# Patient Record
Sex: FEMALE | Race: WHITE | Employment: OTHER | URBAN - METROPOLITAN AREA
[De-identification: names, ages, dates, MRNs, and addresses within clinical notes are randomized per-mention and may not be internally consistent; named-entity substitution may affect disease eponyms.]

---

## 2018-02-21 PROBLEM — I10 HYPERTENSION: Status: ACTIVE | Noted: 2018-02-21

## 2018-02-21 PROBLEM — M79.10 MYALGIA: Status: ACTIVE | Noted: 2018-02-21

## 2018-02-21 PROBLEM — M35.3 PMR (POLYMYALGIA RHEUMATICA) (HCC): Status: ACTIVE | Noted: 2018-02-21

## 2018-02-21 PROBLEM — M47.812 ARTHROPATHY OF CERVICAL FACET JOINT: Status: ACTIVE | Noted: 2018-02-21

## 2018-02-21 PROBLEM — E78.5 HYPERLIPIDEMIA: Status: ACTIVE | Noted: 2018-02-21

## 2018-03-15 RX ORDER — CLOPIDOGREL BISULFATE 75 MG/1
75 TABLET ORAL DAILY
Qty: 30 TABLET | Refills: 3 | Status: SHIPPED | OUTPATIENT
Start: 2018-03-15 | End: 2018-08-09 | Stop reason: SDUPTHER

## 2018-03-23 RX ORDER — CLONIDINE HYDROCHLORIDE 0.1 MG/1
0.1 TABLET ORAL 2 TIMES DAILY
Qty: 180 TABLET | Refills: 0 | Status: SHIPPED | OUTPATIENT
Start: 2018-03-23 | End: 2018-08-09 | Stop reason: SDUPTHER

## 2018-04-03 RX ORDER — PREDNISONE 10 MG/1
10 TABLET ORAL DAILY
Qty: 90 TABLET | Refills: 0 | Status: SHIPPED | OUTPATIENT
Start: 2018-04-03 | End: 2018-04-13

## 2018-04-17 ENCOUNTER — HOSPITAL ENCOUNTER (OUTPATIENT)
Dept: MAMMOGRAPHY | Age: 83
Discharge: HOME | End: 2018-04-19
Payer: MEDICARE

## 2018-04-17 DIAGNOSIS — M35.3 POLYMYALGIA RHEUMATICA (HCC): ICD-10-CM

## 2018-04-17 PROCEDURE — 77080 DXA BONE DENSITY AXIAL: CPT

## 2018-05-03 RX ORDER — LEVOTHYROXINE SODIUM 0.05 MG/1
50 TABLET ORAL DAILY
Qty: 90 TABLET | Refills: 0 | Status: SHIPPED | OUTPATIENT
Start: 2018-05-03 | End: 2018-08-09

## 2018-05-11 ENCOUNTER — HOSPITAL ENCOUNTER (EMERGENCY)
Age: 83
Discharge: HOME | End: 2018-05-11
Payer: MEDICARE

## 2018-05-11 VITALS
OXYGEN SATURATION: 98 % | WEIGHT: 150 LBS | RESPIRATION RATE: 16 BRPM | DIASTOLIC BLOOD PRESSURE: 83 MMHG | SYSTOLIC BLOOD PRESSURE: 204 MMHG | HEART RATE: 57 BPM | BODY MASS INDEX: 27.44 KG/M2 | TEMPERATURE: 98.4 F

## 2018-05-11 DIAGNOSIS — H10.32 ACUTE BACTERIAL CONJUNCTIVITIS OF LEFT EYE: Primary | ICD-10-CM

## 2018-05-11 PROCEDURE — 99212 OFFICE O/P EST SF 10 MIN: CPT

## 2018-05-11 RX ORDER — POLYMYXIN B SULFATE AND TRIMETHOPRIM 1; 10000 MG/ML; [USP'U]/ML
1 SOLUTION OPHTHALMIC EVERY 4 HOURS
Qty: 1 BOTTLE | Refills: 0 | Status: SHIPPED | OUTPATIENT
Start: 2018-05-11 | End: 2018-05-18

## 2018-05-17 ENCOUNTER — OFFICE VISIT (OUTPATIENT)
Dept: PRIMARY CARE CLINIC | Age: 83
End: 2018-05-17
Payer: MEDICARE

## 2018-05-17 VITALS
SYSTOLIC BLOOD PRESSURE: 124 MMHG | BODY MASS INDEX: 28.52 KG/M2 | WEIGHT: 155 LBS | OXYGEN SATURATION: 98 % | HEIGHT: 62 IN | DIASTOLIC BLOOD PRESSURE: 80 MMHG | HEART RATE: 61 BPM

## 2018-05-17 DIAGNOSIS — E03.9 HYPOTHYROIDISM, UNSPECIFIED TYPE: ICD-10-CM

## 2018-05-17 DIAGNOSIS — G45.8 OTHER SPECIFIED TRANSIENT CEREBRAL ISCHEMIAS: ICD-10-CM

## 2018-05-17 DIAGNOSIS — M35.3 PMR (POLYMYALGIA RHEUMATICA) (HCC): ICD-10-CM

## 2018-05-17 DIAGNOSIS — H10.89 OTHER CONJUNCTIVITIS OF BOTH EYES: ICD-10-CM

## 2018-05-17 DIAGNOSIS — I10 HYPERTENSION, UNSPECIFIED TYPE: Primary | ICD-10-CM

## 2018-05-17 DIAGNOSIS — E78.5 HYPERLIPIDEMIA, UNSPECIFIED HYPERLIPIDEMIA TYPE: ICD-10-CM

## 2018-05-17 PROCEDURE — 99213 OFFICE O/P EST LOW 20 MIN: CPT | Performed by: INTERNAL MEDICINE

## 2018-05-17 RX ORDER — PREDNISONE 10 MG/1
10 TABLET ORAL EVERY MORNING
COMMUNITY
End: 2018-08-09

## 2018-05-17 RX ORDER — UBIDECARENONE 30 MG
30 CAPSULE ORAL DAILY
COMMUNITY
End: 2018-08-09

## 2018-05-17 RX ORDER — GLUCOSAMINE SULFATE DIPOT CHLR 1000 MG
1 TABLET ORAL DAILY
COMMUNITY
Start: 2018-04-28

## 2018-05-17 ASSESSMENT — ENCOUNTER SYMPTOMS
BLURRED VISION: 0
ABDOMINAL PAIN: 0
ORTHOPNEA: 0
EYE DISCHARGE: 1
EYE REDNESS: 1
NAUSEA: 0
HEMOPTYSIS: 0
BLOOD IN STOOL: 0
SHORTNESS OF BREATH: 0

## 2018-05-31 ENCOUNTER — OFFICE VISIT (OUTPATIENT)
Dept: PRIMARY CARE CLINIC | Age: 83
End: 2018-05-31
Payer: MEDICARE

## 2018-05-31 VITALS
OXYGEN SATURATION: 92 % | DIASTOLIC BLOOD PRESSURE: 82 MMHG | WEIGHT: 157 LBS | HEIGHT: 62 IN | SYSTOLIC BLOOD PRESSURE: 142 MMHG | BODY MASS INDEX: 28.89 KG/M2 | HEART RATE: 67 BPM

## 2018-05-31 DIAGNOSIS — E78.5 HYPERLIPIDEMIA, UNSPECIFIED HYPERLIPIDEMIA TYPE: ICD-10-CM

## 2018-05-31 DIAGNOSIS — I10 HYPERTENSION, UNSPECIFIED TYPE: Primary | ICD-10-CM

## 2018-05-31 DIAGNOSIS — G45.8 OTHER SPECIFIED TRANSIENT CEREBRAL ISCHEMIAS: ICD-10-CM

## 2018-05-31 DIAGNOSIS — M25.551 ARTHRALGIA OF RIGHT HIP: ICD-10-CM

## 2018-05-31 DIAGNOSIS — E03.8 OTHER SPECIFIED HYPOTHYROIDISM: ICD-10-CM

## 2018-05-31 DIAGNOSIS — M35.3 PMR (POLYMYALGIA RHEUMATICA) (HCC): ICD-10-CM

## 2018-05-31 PROCEDURE — 99213 OFFICE O/P EST LOW 20 MIN: CPT | Performed by: INTERNAL MEDICINE

## 2018-05-31 ASSESSMENT — ENCOUNTER SYMPTOMS
BLURRED VISION: 0
BLOOD IN STOOL: 0
ORTHOPNEA: 0
EYE DISCHARGE: 0
NAUSEA: 0
SHORTNESS OF BREATH: 0
HEMOPTYSIS: 0
ABDOMINAL PAIN: 0

## 2018-08-03 ENCOUNTER — HOSPITAL ENCOUNTER (OUTPATIENT)
Age: 83
Discharge: HOME | End: 2018-08-05
Payer: MEDICARE

## 2018-08-03 DIAGNOSIS — M35.3 PMR (POLYMYALGIA RHEUMATICA) (HCC): ICD-10-CM

## 2018-08-03 DIAGNOSIS — E03.8 OTHER SPECIFIED HYPOTHYROIDISM: ICD-10-CM

## 2018-08-03 DIAGNOSIS — E78.5 HYPERLIPIDEMIA, UNSPECIFIED HYPERLIPIDEMIA TYPE: ICD-10-CM

## 2018-08-03 DIAGNOSIS — I10 HYPERTENSION, UNSPECIFIED TYPE: ICD-10-CM

## 2018-08-03 LAB
ALBUMIN SERPL-MCNC: 4 G/DL (ref 3.5–5.2)
ALP BLD-CCNC: 86 U/L (ref 35–104)
ALT SERPL-CCNC: 9 U/L (ref 0–32)
ANION GAP SERPL CALCULATED.3IONS-SCNC: 17 MMOL/L (ref 7–16)
AST SERPL-CCNC: 17 U/L (ref 0–31)
BASOPHILS ABSOLUTE: 0.02 E9/L (ref 0–0.2)
BASOPHILS RELATIVE PERCENT: 0.2 % (ref 0–2)
BILIRUB SERPL-MCNC: 0.3 MG/DL (ref 0–1.2)
BUN BLDV-MCNC: 22 MG/DL (ref 8–23)
CALCIUM SERPL-MCNC: 9.4 MG/DL (ref 8.6–10.2)
CHLORIDE BLD-SCNC: 104 MMOL/L (ref 98–107)
CHOLESTEROL, FASTING: 226 MG/DL (ref 0–199)
CO2: 25 MMOL/L (ref 22–29)
CREAT SERPL-MCNC: 0.9 MG/DL (ref 0.5–1)
EOSINOPHILS ABSOLUTE: 0.04 E9/L (ref 0.05–0.5)
EOSINOPHILS RELATIVE PERCENT: 0.5 % (ref 0–6)
GFR AFRICAN AMERICAN: >60
GFR NON-AFRICAN AMERICAN: >60 ML/MIN/1.73
GLUCOSE FASTING: 83 MG/DL (ref 74–109)
HCT VFR BLD CALC: 44.9 % (ref 34–48)
HDLC SERPL-MCNC: 50 MG/DL
HEMOGLOBIN: 14 G/DL (ref 11.5–15.5)
IMMATURE GRANULOCYTES #: 0.05 E9/L
IMMATURE GRANULOCYTES %: 0.6 % (ref 0–5)
LDL CHOLESTEROL CALCULATED: 142 MG/DL (ref 0–99)
LYMPHOCYTES ABSOLUTE: 2.64 E9/L (ref 1.5–4)
LYMPHOCYTES RELATIVE PERCENT: 31.2 % (ref 20–42)
MCH RBC QN AUTO: 30.7 PG (ref 26–35)
MCHC RBC AUTO-ENTMCNC: 31.2 % (ref 32–34.5)
MCV RBC AUTO: 98.5 FL (ref 80–99.9)
MONOCYTES ABSOLUTE: 0.66 E9/L (ref 0.1–0.95)
MONOCYTES RELATIVE PERCENT: 7.8 % (ref 2–12)
NEUTROPHILS ABSOLUTE: 5.06 E9/L (ref 1.8–7.3)
NEUTROPHILS RELATIVE PERCENT: 59.7 % (ref 43–80)
PDW BLD-RTO: 13.3 FL (ref 11.5–15)
PLATELET # BLD: 239 E9/L (ref 130–450)
PMV BLD AUTO: 10 FL (ref 7–12)
POTASSIUM SERPL-SCNC: 4.3 MMOL/L (ref 3.5–5)
RBC # BLD: 4.56 E12/L (ref 3.5–5.5)
SODIUM BLD-SCNC: 146 MMOL/L (ref 132–146)
TOTAL PROTEIN: 6.6 G/DL (ref 6.4–8.3)
TRIGLYCERIDE, FASTING: 169 MG/DL (ref 0–149)
TSH SERPL DL<=0.05 MIU/L-ACNC: 2.3 UIU/ML (ref 0.27–4.2)
VLDLC SERPL CALC-MCNC: 34 MG/DL
WBC # BLD: 8.5 E9/L (ref 4.5–11.5)

## 2018-08-03 PROCEDURE — 80061 LIPID PANEL: CPT

## 2018-08-03 PROCEDURE — 80053 COMPREHEN METABOLIC PANEL: CPT

## 2018-08-03 PROCEDURE — 84443 ASSAY THYROID STIM HORMONE: CPT

## 2018-08-03 PROCEDURE — 85025 COMPLETE CBC W/AUTO DIFF WBC: CPT

## 2018-08-09 ENCOUNTER — OFFICE VISIT (OUTPATIENT)
Dept: PRIMARY CARE CLINIC | Age: 83
End: 2018-08-09
Payer: MEDICARE

## 2018-08-09 VITALS
WEIGHT: 157 LBS | HEIGHT: 62 IN | HEART RATE: 98 BPM | BODY MASS INDEX: 28.89 KG/M2 | OXYGEN SATURATION: 95 % | DIASTOLIC BLOOD PRESSURE: 78 MMHG | TEMPERATURE: 98.3 F | SYSTOLIC BLOOD PRESSURE: 132 MMHG

## 2018-08-09 DIAGNOSIS — I10 HYPERTENSION, UNSPECIFIED TYPE: Primary | ICD-10-CM

## 2018-08-09 DIAGNOSIS — E03.8 OTHER SPECIFIED HYPOTHYROIDISM: ICD-10-CM

## 2018-08-09 DIAGNOSIS — E78.5 HYPERLIPIDEMIA, UNSPECIFIED HYPERLIPIDEMIA TYPE: ICD-10-CM

## 2018-08-09 DIAGNOSIS — M35.3 PMR (POLYMYALGIA RHEUMATICA) (HCC): ICD-10-CM

## 2018-08-09 DIAGNOSIS — Z86.73 HISTORY OF TIA (TRANSIENT ISCHEMIC ATTACK): ICD-10-CM

## 2018-08-09 PROBLEM — E03.9 HYPOTHYROIDISM: Status: ACTIVE | Noted: 2018-05-31

## 2018-08-09 PROCEDURE — G8598 ASA/ANTIPLAT THER USED: HCPCS | Performed by: INTERNAL MEDICINE

## 2018-08-09 PROCEDURE — G8419 CALC BMI OUT NRM PARAM NOF/U: HCPCS | Performed by: INTERNAL MEDICINE

## 2018-08-09 PROCEDURE — 1101F PT FALLS ASSESS-DOCD LE1/YR: CPT | Performed by: INTERNAL MEDICINE

## 2018-08-09 PROCEDURE — 1036F TOBACCO NON-USER: CPT | Performed by: INTERNAL MEDICINE

## 2018-08-09 PROCEDURE — G8427 DOCREV CUR MEDS BY ELIG CLIN: HCPCS | Performed by: INTERNAL MEDICINE

## 2018-08-09 PROCEDURE — G8399 PT W/DXA RESULTS DOCUMENT: HCPCS | Performed by: INTERNAL MEDICINE

## 2018-08-09 PROCEDURE — 1123F ACP DISCUSS/DSCN MKR DOCD: CPT | Performed by: INTERNAL MEDICINE

## 2018-08-09 PROCEDURE — 4040F PNEUMOC VAC/ADMIN/RCVD: CPT | Performed by: INTERNAL MEDICINE

## 2018-08-09 PROCEDURE — 1090F PRES/ABSN URINE INCON ASSESS: CPT | Performed by: INTERNAL MEDICINE

## 2018-08-09 PROCEDURE — 99213 OFFICE O/P EST LOW 20 MIN: CPT | Performed by: INTERNAL MEDICINE

## 2018-08-09 RX ORDER — CLOPIDOGREL BISULFATE 75 MG/1
75 TABLET ORAL DAILY
Qty: 90 TABLET | Refills: 0 | Status: SHIPPED | OUTPATIENT
Start: 2018-08-09 | End: 2019-01-31 | Stop reason: SDUPTHER

## 2018-08-09 RX ORDER — LEVOTHYROXINE SODIUM 0.05 MG/1
TABLET ORAL
COMMUNITY
End: 2018-08-09 | Stop reason: SDUPTHER

## 2018-08-09 RX ORDER — CLONIDINE HYDROCHLORIDE 0.1 MG/1
0.1 TABLET ORAL 2 TIMES DAILY
Qty: 180 TABLET | Refills: 0 | Status: SHIPPED | OUTPATIENT
Start: 2018-08-09 | End: 2018-11-29 | Stop reason: SDUPTHER

## 2018-08-09 RX ORDER — LEVOTHYROXINE SODIUM 0.05 MG/1
50 TABLET ORAL DAILY
Qty: 90 TABLET | Refills: 0 | Status: SHIPPED | OUTPATIENT
Start: 2018-08-09 | End: 2018-11-29 | Stop reason: SDUPTHER

## 2018-08-09 ASSESSMENT — ENCOUNTER SYMPTOMS
ABDOMINAL PAIN: 0
ORTHOPNEA: 0
BLOOD IN STOOL: 0
SHORTNESS OF BREATH: 0
NAUSEA: 0
BLURRED VISION: 0
EYE DISCHARGE: 0
HEMOPTYSIS: 0

## 2018-08-09 NOTE — PROGRESS NOTES
Chief Complaint   Patient presents with    Hypertension     complains of elevated bp in evening    Discuss Labs    Hip Pain     right hip pain after fall radiating down leg       HPI:  Patient is here for follow-up, Feel okay . Also following with Dr Ruy Denney and also Rheumatologist.     Allergy and Medications are reviewed and updated. Past Medical History, Surgical History, and Family History has been reviewed and updated. Review of Systems:  Review of Systems   Constitutional: Negative for chills and fever. HENT: Negative for congestion and ear pain. Eyes: Negative for blurred vision and discharge. Respiratory: Negative for hemoptysis and shortness of breath (No new SOB). Cardiovascular: Negative for chest pain, orthopnea and leg swelling. Gastrointestinal: Negative for abdominal pain, blood in stool and nausea. Genitourinary: Negative for flank pain and hematuria. Musculoskeletal: Negative for myalgias and neck pain. Skin: Negative for rash. Neurological: Negative for dizziness, focal weakness and seizures. Endo/Heme/Allergies: Negative for polydipsia. Does not bruise/bleed easily. Psychiatric/Behavioral: Negative for depression, hallucinations and suicidal ideas. Vitals:    08/09/18 1501   BP: 132/78   Pulse: 98   Temp: 98.3 °F (36.8 °C)   TempSrc: Oral   SpO2: 95%   Weight: 157 lb (71.2 kg)   Height: 5' 2\" (1.575 m)       Physical Exam   Constitutional: She is oriented to person, place, and time. She appears well-developed and well-nourished. HENT:   Head: Normocephalic and atraumatic. Mouth/Throat: No oropharyngeal exudate. Eyes: Conjunctivae are normal. Pupils are equal, round, and reactive to light. Neck: No JVD present. Cardiovascular: Normal rate and regular rhythm. Pulmonary/Chest: Effort normal and breath sounds normal. She has no rales. Abdominal: Soft. Bowel sounds are normal.   Musculoskeletal: Normal range of motion. She exhibits no edema. Lymphadenopathy:     She has no cervical adenopathy. Neurological: She is alert and oriented to person, place, and time. Skin: Skin is warm and dry. Psychiatric: Her behavior is normal.   Vitals reviewed. Labs :    Lab Results   Component Value Date    WBC 8.5 08/03/2018    HGB 14.0 08/03/2018    HCT 44.9 08/03/2018     08/03/2018    CHOL 200 (H) 01/26/2018    TRIG 170 (H) 01/26/2018    HDL 50 08/03/2018    ALT 9 08/03/2018    AST 17 08/03/2018     08/03/2018    K 4.3 08/03/2018     08/03/2018    CREATININE 0.9 08/03/2018    BUN 22 08/03/2018    CO2 25 08/03/2018    TSH 2.300 08/03/2018    INR 1.0 07/21/2015    GLUF 83 08/03/2018    LABA1C 5.8 07/21/2015     Lab Results   Component Value Date    COLORU Yellow 07/14/2017    NITRU Negative 07/14/2017    GLUCOSEU Negative 07/14/2017    GLUCOSEU NEGATIVE 12/07/2011    KETUA Negative 07/14/2017    UROBILINOGEN 0.2 07/14/2017    BILIRUBINUR Negative 07/14/2017    BILIRUBINUR NEGATIVE 12/07/2011           ASSESSMENT     Patient Active Problem List    Diagnosis Date Noted    Hypothyroidism 05/31/2018    Hypertension 02/21/2018    Hyperlipidemia 02/21/2018     Overview Note:     Can not tolerate Statin      Myalgia 02/21/2018    PMR (polymyalgia rheumatica) ([facility] [address] 02/21/2018    Arthropathy of cervical facet joint ([facility] [address] 02/21/2018    Arthralgia of right hip 03/07/2016    Cerebral infarction [facility])      Overview Note:     Replacing Inactive Diagnoses      History of TIA (transient ischemic attack) 07/20/2015    Right cataract 02/04/2014    Left cataract 08/27/2013    Sprain of acromioclavicular ligament 06/04/2012     Overview Note:     replace inactive diagnosis      Shoulder pain 06/04/2012    Trochanteric bursitis 11/01/2011    Hip pain 11/01/2011        Diagnosis:     ICD-10-CM ICD-9-CM    1. Hypertension, unspecified type I10 401.9    2. Hyperlipidemia, unspecified hyperlipidemia type E78.5 272.4    3.  Other specified hypothyroidism E03.8 244.8    4. PMR [facility]polymyalgia rheumatica) ([facility] M35.3 725    5. History of TIA (transient ischemic attack) Z86.73 V12.54        PLAN:      Pt is stable on current medical treatment. Continue current treatment plan    Side effects/Adverse effects/Precautions are reviewed with patient. Low salt, Low Carb diet an low fat diet  Continue medications as advised and take them regularly  Regular exercises as advised    Discussed natural and expected course of this diagnosis and need to alert me if symptoms do not follow expected course, or if any worse. Smoking cessation if applicable, discussed with patient. There are no Patient Instructions on file for this visit. Return in about 2 months (around 10/9/2018).

## 2018-08-27 ENCOUNTER — APPOINTMENT (OUTPATIENT)
Dept: GENERAL RADIOLOGY | Age: 83
End: 2018-08-27
Payer: MEDICARE

## 2018-08-27 ENCOUNTER — HOSPITAL ENCOUNTER (EMERGENCY)
Age: 83
Discharge: HOME | End: 2018-08-27
Attending: EMERGENCY MEDICINE
Payer: MEDICARE

## 2018-08-27 VITALS
HEIGHT: 62 IN | RESPIRATION RATE: 16 BRPM | BODY MASS INDEX: 27.97 KG/M2 | OXYGEN SATURATION: 95 % | DIASTOLIC BLOOD PRESSURE: 84 MMHG | TEMPERATURE: 97.8 F | SYSTOLIC BLOOD PRESSURE: 188 MMHG | HEART RATE: 47 BPM | WEIGHT: 152 LBS

## 2018-08-27 DIAGNOSIS — S50.12XA CONTUSION OF LEFT FOREARM, INITIAL ENCOUNTER: ICD-10-CM
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
## 2018-08-27 DIAGNOSIS — S70.01XA CONTUSION OF RIGHT HIP, INITIAL ENCOUNTER: ICD-10-CM

## 2018-08-27 PROCEDURE — 73502 X-RAY EXAM HIP UNI 2-3 VIEWS: CPT

## 2018-08-27 PROCEDURE — 73060 X-RAY EXAM OF HUMERUS: CPT

## 2018-08-27 PROCEDURE — 6370000000 HC RX 637 (ALT 250 FOR IP): Performed by: EMERGENCY MEDICINE

## 2018-08-27 PROCEDURE — 99284 EMERGENCY DEPT VISIT MOD MDM: CPT

## 2018-08-27 RX ORDER — ACETAMINOPHEN 500 MG
1000 TABLET ORAL ONCE
Status: COMPLETED | OUTPATIENT
Start: 2018-08-27 | End: 2018-08-27

## 2018-08-27 RX ORDER — TRAMADOL HYDROCHLORIDE 50 MG/1
50 TABLET ORAL EVERY 6 HOURS PRN
Qty: 8 TABLET | Refills: 0 | Status: SHIPPED | OUTPATIENT
Start: 2018-08-27 | End: 2018-09-01

## 2018-08-27 RX ADMIN — ACETAMINOPHEN 1000 MG: 500 TABLET, FILM COATED ORAL at 17:11

## 2018-08-27 ASSESSMENT — PAIN SCALES - GENERAL
PAINLEVEL_OUTOF10: 10
PAINLEVEL_OUTOF10: 10
PAINLEVEL_OUTOF10: 0

## 2018-08-27 ASSESSMENT — PAIN DESCRIPTION - PAIN TYPE: TYPE: ACUTE PAIN

## 2018-08-27 ASSESSMENT — PAIN DESCRIPTION - ORIENTATION: ORIENTATION: RIGHT

## 2018-08-27 ASSESSMENT — PAIN DESCRIPTION - DESCRIPTORS: DESCRIPTORS: SHOOTING

## 2018-08-27 ASSESSMENT — PAIN DESCRIPTION - LOCATION: LOCATION: HIP

## 2018-10-03 ENCOUNTER — HOSPITAL ENCOUNTER (OUTPATIENT)
Dept: MAMMOGRAPHY | Age: 83
Discharge: HOME | End: 2018-10-05
Payer: MEDICARE

## 2018-10-03 DIAGNOSIS — Z12.39 BREAST SCREENING: ICD-10-CM

## 2018-10-03 PROCEDURE — 77067 SCR MAMMO BI INCL CAD: CPT

## 2018-10-11 ENCOUNTER — OFFICE VISIT (OUTPATIENT)
Dept: PRIMARY CARE CLINIC | Age: 83
End: 2018-10-11
Payer: MEDICARE

## 2018-10-11 VITALS
WEIGHT: 152 LBS | BODY MASS INDEX: 27.97 KG/M2 | SYSTOLIC BLOOD PRESSURE: 160 MMHG | HEART RATE: 78 BPM | HEIGHT: 62 IN | OXYGEN SATURATION: 98 % | DIASTOLIC BLOOD PRESSURE: 84 MMHG | TEMPERATURE: 97.9 F

## 2018-10-11 DIAGNOSIS — M35.3 PMR (POLYMYALGIA RHEUMATICA) (HCC): ICD-10-CM

## 2018-10-11 DIAGNOSIS — Z23 NEEDS FLU SHOT: ICD-10-CM

## 2018-10-11 DIAGNOSIS — I10 HYPERTENSION, UNSPECIFIED TYPE: Primary | ICD-10-CM

## 2018-10-11 DIAGNOSIS — E78.5 HYPERLIPIDEMIA, UNSPECIFIED HYPERLIPIDEMIA TYPE: ICD-10-CM

## 2018-10-11 DIAGNOSIS — Z86.73 HISTORY OF TIA (TRANSIENT ISCHEMIC ATTACK): ICD-10-CM

## 2018-10-11 PROCEDURE — G0008 ADMIN INFLUENZA VIRUS VAC: HCPCS | Performed by: INTERNAL MEDICINE

## 2018-10-11 PROCEDURE — 99213 OFFICE O/P EST LOW 20 MIN: CPT | Performed by: INTERNAL MEDICINE

## 2018-10-11 PROCEDURE — 1090F PRES/ABSN URINE INCON ASSESS: CPT | Performed by: INTERNAL MEDICINE

## 2018-10-11 PROCEDURE — G8419 CALC BMI OUT NRM PARAM NOF/U: HCPCS | Performed by: INTERNAL MEDICINE

## 2018-10-11 PROCEDURE — G8598 ASA/ANTIPLAT THER USED: HCPCS | Performed by: INTERNAL MEDICINE

## 2018-10-11 PROCEDURE — 4040F PNEUMOC VAC/ADMIN/RCVD: CPT | Performed by: INTERNAL MEDICINE

## 2018-10-11 PROCEDURE — G8427 DOCREV CUR MEDS BY ELIG CLIN: HCPCS | Performed by: INTERNAL MEDICINE

## 2018-10-11 PROCEDURE — 1123F ACP DISCUSS/DSCN MKR DOCD: CPT | Performed by: INTERNAL MEDICINE

## 2018-10-11 PROCEDURE — G8482 FLU IMMUNIZE ORDER/ADMIN: HCPCS | Performed by: INTERNAL MEDICINE

## 2018-10-11 PROCEDURE — 1101F PT FALLS ASSESS-DOCD LE1/YR: CPT | Performed by: INTERNAL MEDICINE

## 2018-10-11 PROCEDURE — 1036F TOBACCO NON-USER: CPT | Performed by: INTERNAL MEDICINE

## 2018-10-11 PROCEDURE — 90662 IIV NO PRSV INCREASED AG IM: CPT | Performed by: INTERNAL MEDICINE

## 2018-10-11 PROCEDURE — G8399 PT W/DXA RESULTS DOCUMENT: HCPCS | Performed by: INTERNAL MEDICINE

## 2018-10-11 RX ORDER — TRIAMTERENE AND HYDROCHLOROTHIAZIDE 37.5; 25 MG/1; MG/1
0.5 TABLET ORAL DAILY
Qty: 30 TABLET | Refills: 3 | Status: SHIPPED | OUTPATIENT
Start: 2018-10-11 | End: 2019-01-31 | Stop reason: SDUPTHER

## 2018-10-11 ASSESSMENT — ENCOUNTER SYMPTOMS
SINUS PAIN: 0
EYE PAIN: 0
SORE THROAT: 0
BLOOD IN STOOL: 0
EYE DISCHARGE: 0
NAUSEA: 0
ABDOMINAL PAIN: 0
SHORTNESS OF BREATH: 0

## 2018-11-14 ENCOUNTER — TELEPHONE (OUTPATIENT)
Dept: ADMINISTRATIVE | Age: 83
End: 2018-11-14

## 2018-11-29 ENCOUNTER — OFFICE VISIT (OUTPATIENT)
Dept: PRIMARY CARE CLINIC | Age: 83
End: 2018-11-29
Payer: MEDICARE

## 2018-11-29 VITALS
BODY MASS INDEX: 28.16 KG/M2 | DIASTOLIC BLOOD PRESSURE: 66 MMHG | WEIGHT: 153 LBS | HEART RATE: 79 BPM | SYSTOLIC BLOOD PRESSURE: 134 MMHG | OXYGEN SATURATION: 96 % | HEIGHT: 62 IN

## 2018-11-29 DIAGNOSIS — E78.5 HYPERLIPIDEMIA, UNSPECIFIED HYPERLIPIDEMIA TYPE: ICD-10-CM

## 2018-11-29 DIAGNOSIS — E03.8 OTHER SPECIFIED HYPOTHYROIDISM: ICD-10-CM

## 2018-11-29 DIAGNOSIS — I10 HYPERTENSION, UNSPECIFIED TYPE: Primary | ICD-10-CM

## 2018-11-29 DIAGNOSIS — Z86.73 HISTORY OF TIA (TRANSIENT ISCHEMIC ATTACK): ICD-10-CM

## 2018-11-29 DIAGNOSIS — M35.3 PMR (POLYMYALGIA RHEUMATICA) (HCC): ICD-10-CM

## 2018-11-29 PROCEDURE — G8427 DOCREV CUR MEDS BY ELIG CLIN: HCPCS | Performed by: INTERNAL MEDICINE

## 2018-11-29 PROCEDURE — 1101F PT FALLS ASSESS-DOCD LE1/YR: CPT | Performed by: INTERNAL MEDICINE

## 2018-11-29 PROCEDURE — 1090F PRES/ABSN URINE INCON ASSESS: CPT | Performed by: INTERNAL MEDICINE

## 2018-11-29 PROCEDURE — G8482 FLU IMMUNIZE ORDER/ADMIN: HCPCS | Performed by: INTERNAL MEDICINE

## 2018-11-29 PROCEDURE — 1123F ACP DISCUSS/DSCN MKR DOCD: CPT | Performed by: INTERNAL MEDICINE

## 2018-11-29 PROCEDURE — G8399 PT W/DXA RESULTS DOCUMENT: HCPCS | Performed by: INTERNAL MEDICINE

## 2018-11-29 PROCEDURE — 4040F PNEUMOC VAC/ADMIN/RCVD: CPT | Performed by: INTERNAL MEDICINE

## 2018-11-29 PROCEDURE — G8419 CALC BMI OUT NRM PARAM NOF/U: HCPCS | Performed by: INTERNAL MEDICINE

## 2018-11-29 PROCEDURE — 1036F TOBACCO NON-USER: CPT | Performed by: INTERNAL MEDICINE

## 2018-11-29 PROCEDURE — G8598 ASA/ANTIPLAT THER USED: HCPCS | Performed by: INTERNAL MEDICINE

## 2018-11-29 PROCEDURE — 99213 OFFICE O/P EST LOW 20 MIN: CPT | Performed by: INTERNAL MEDICINE

## 2018-11-29 RX ORDER — LEVOTHYROXINE SODIUM 0.05 MG/1
50 TABLET ORAL DAILY
Qty: 90 TABLET | Refills: 0 | Status: SHIPPED | OUTPATIENT
Start: 2018-11-29 | End: 2019-01-31 | Stop reason: SDUPTHER

## 2018-11-29 RX ORDER — CLONIDINE HYDROCHLORIDE 0.1 MG/1
0.1 TABLET ORAL 2 TIMES DAILY
Qty: 180 TABLET | Refills: 0 | Status: SHIPPED | OUTPATIENT
Start: 2018-11-29 | End: 2019-01-31 | Stop reason: SDUPTHER

## 2018-11-29 ASSESSMENT — ENCOUNTER SYMPTOMS
NAUSEA: 0
BLOOD IN STOOL: 0
SHORTNESS OF BREATH: 0
EYE DISCHARGE: 0
EYE PAIN: 0
SORE THROAT: 0
ABDOMINAL PAIN: 0
SINUS PAIN: 0

## 2018-11-29 NOTE — PROGRESS NOTES
effects/Adverse effects/Precautions are reviewed with patient. Low salt, Low Carb diet an low fat diet  Continue medications as advised and take them regularly  Regular exercises as advised    Discussed natural and expected course of this diagnosis and need to alert me if symptoms do not follow expected course, or if any worse. Smoking cessation if applicable, discussed with patient. There are no Patient Instructions on file for this visit. Return in about 2 months (around 1/29/2019).

## 2018-12-18 ENCOUNTER — HOSPITAL ENCOUNTER (EMERGENCY)
Age: 83
Discharge: HOME | End: 2018-12-18
Attending: EMERGENCY MEDICINE
Payer: MEDICARE

## 2018-12-18 VITALS
DIASTOLIC BLOOD PRESSURE: 75 MMHG | OXYGEN SATURATION: 95 % | WEIGHT: 149 LBS | SYSTOLIC BLOOD PRESSURE: 170 MMHG | RESPIRATION RATE: 16 BRPM | HEIGHT: 62 IN | TEMPERATURE: 98.6 F | BODY MASS INDEX: 27.42 KG/M2 | HEART RATE: 66 BPM

## 2018-12-18 DIAGNOSIS — R19.7 DIARRHEA, UNSPECIFIED TYPE: Primary | ICD-10-CM

## 2018-12-18 LAB
ALBUMIN SERPL-MCNC: 3.8 G/DL (ref 3.5–5.2)
ALP BLD-CCNC: 78 U/L (ref 35–104)
ALT SERPL-CCNC: 12 U/L (ref 0–32)
ANION GAP SERPL CALCULATED.3IONS-SCNC: 10 MMOL/L (ref 7–16)
AST SERPL-CCNC: 17 U/L (ref 0–31)
BASOPHILS ABSOLUTE: 0.02 E9/L (ref 0–0.2)
BASOPHILS RELATIVE PERCENT: 0.2 % (ref 0–2)
BILIRUB SERPL-MCNC: 0.3 MG/DL (ref 0–1.2)
BILIRUBIN URINE: NEGATIVE
BLOOD, URINE: NEGATIVE
BUN BLDV-MCNC: 21 MG/DL (ref 8–23)
CALCIUM SERPL-MCNC: 9.8 MG/DL (ref 8.6–10.2)
CHLORIDE BLD-SCNC: 100 MMOL/L (ref 98–107)
CLARITY: CLEAR
CO2: 29 MMOL/L (ref 22–29)
COLOR: YELLOW
CREAT SERPL-MCNC: 1 MG/DL (ref 0.5–1)
EOSINOPHILS ABSOLUTE: 0.07 E9/L (ref 0.05–0.5)
EOSINOPHILS RELATIVE PERCENT: 0.7 % (ref 0–6)
GFR AFRICAN AMERICAN: >60
GFR NON-AFRICAN AMERICAN: 53 ML/MIN/1.73
GLUCOSE BLD-MCNC: 104 MG/DL (ref 74–99)
GLUCOSE URINE: NEGATIVE MG/DL
HCT VFR BLD CALC: 43.2 % (ref 34–48)
HEMOGLOBIN: 13.8 G/DL (ref 11.5–15.5)
IMMATURE GRANULOCYTES #: 0.05 E9/L
IMMATURE GRANULOCYTES %: 0.5 % (ref 0–5)
KETONES, URINE: NEGATIVE MG/DL
LEUKOCYTE ESTERASE, URINE: NEGATIVE
LYMPHOCYTES ABSOLUTE: 1.55 E9/L (ref 1.5–4)
LYMPHOCYTES RELATIVE PERCENT: 16.2 % (ref 20–42)
MCH RBC QN AUTO: 29.8 PG (ref 26–35)
MCHC RBC AUTO-ENTMCNC: 31.9 % (ref 32–34.5)
MCV RBC AUTO: 93.3 FL (ref 80–99.9)
MONOCYTES ABSOLUTE: 0.63 E9/L (ref 0.1–0.95)
MONOCYTES RELATIVE PERCENT: 6.6 % (ref 2–12)
NEUTROPHILS ABSOLUTE: 7.23 E9/L (ref 1.8–7.3)
NEUTROPHILS RELATIVE PERCENT: 75.8 % (ref 43–80)
NITRITE, URINE: NEGATIVE
PDW BLD-RTO: 13.1 FL (ref 11.5–15)
PH UA: 5.5 (ref 5–9)
PLATELET # BLD: 251 E9/L (ref 130–450)
PMV BLD AUTO: 9.4 FL (ref 7–12)
POTASSIUM SERPL-SCNC: 3.8 MMOL/L (ref 3.5–5)
PROTEIN UA: NEGATIVE MG/DL
RBC # BLD: 4.63 E12/L (ref 3.5–5.5)
SODIUM BLD-SCNC: 139 MMOL/L (ref 132–146)
SPECIFIC GRAVITY UA: <=1.005 (ref 1–1.03)
TOTAL PROTEIN: 6.8 G/DL (ref 6.4–8.3)
UROBILINOGEN, URINE: 0.2 E.U./DL
WBC # BLD: 9.6 E9/L (ref 4.5–11.5)

## 2018-12-18 PROCEDURE — 85025 COMPLETE CBC W/AUTO DIFF WBC: CPT

## 2018-12-18 PROCEDURE — 36415 COLL VENOUS BLD VENIPUNCTURE: CPT

## 2018-12-18 PROCEDURE — 80053 COMPREHEN METABOLIC PANEL: CPT

## 2018-12-18 PROCEDURE — 6370000000 HC RX 637 (ALT 250 FOR IP): Performed by: PHYSICIAN ASSISTANT

## 2018-12-18 PROCEDURE — 81003 URINALYSIS AUTO W/O SCOPE: CPT

## 2018-12-18 PROCEDURE — 2580000003 HC RX 258: Performed by: PHYSICIAN ASSISTANT

## 2018-12-18 PROCEDURE — 99284 EMERGENCY DEPT VISIT MOD MDM: CPT

## 2018-12-18 PROCEDURE — 87088 URINE BACTERIA CULTURE: CPT

## 2018-12-18 RX ORDER — AZITHROMYCIN 500 MG/1
500 TABLET, FILM COATED ORAL DAILY
Qty: 3 TABLET | Refills: 0 | Status: SHIPPED | OUTPATIENT
Start: 2018-12-18 | End: 2018-12-21

## 2018-12-18 RX ORDER — AZITHROMYCIN 250 MG/1
500 TABLET, FILM COATED ORAL ONCE
Status: COMPLETED | OUTPATIENT
Start: 2018-12-18 | End: 2018-12-18

## 2018-12-18 RX ORDER — 0.9 % SODIUM CHLORIDE 0.9 %
1000 INTRAVENOUS SOLUTION INTRAVENOUS ONCE
Status: COMPLETED | OUTPATIENT
Start: 2018-12-18 | End: 2018-12-18

## 2018-12-18 RX ADMIN — AZITHROMYCIN 500 MG: 250 TABLET, FILM COATED ORAL at 11:33

## 2018-12-18 RX ADMIN — SODIUM CHLORIDE 1000 ML: 9 INJECTION, SOLUTION INTRAVENOUS at 10:21

## 2018-12-20 LAB — URINE CULTURE, ROUTINE: NORMAL

## 2018-12-28 ENCOUNTER — OFFICE VISIT (OUTPATIENT)
Dept: PRIMARY CARE CLINIC | Age: 83
End: 2018-12-28
Payer: MEDICARE

## 2018-12-28 VITALS
BODY MASS INDEX: 27.23 KG/M2 | DIASTOLIC BLOOD PRESSURE: 84 MMHG | OXYGEN SATURATION: 97 % | TEMPERATURE: 98.2 F | HEIGHT: 62 IN | SYSTOLIC BLOOD PRESSURE: 128 MMHG | WEIGHT: 148 LBS | HEART RATE: 67 BPM

## 2018-12-28 DIAGNOSIS — I10 HYPERTENSION, UNSPECIFIED TYPE: Primary | ICD-10-CM

## 2018-12-28 DIAGNOSIS — E03.9 HYPOTHYROIDISM, UNSPECIFIED TYPE: ICD-10-CM

## 2018-12-28 DIAGNOSIS — R19.7 DIARRHEA, UNSPECIFIED TYPE: ICD-10-CM

## 2018-12-28 DIAGNOSIS — E78.5 HYPERLIPIDEMIA, UNSPECIFIED HYPERLIPIDEMIA TYPE: ICD-10-CM

## 2018-12-28 PROCEDURE — 1101F PT FALLS ASSESS-DOCD LE1/YR: CPT | Performed by: FAMILY MEDICINE

## 2018-12-28 PROCEDURE — 99213 OFFICE O/P EST LOW 20 MIN: CPT | Performed by: FAMILY MEDICINE

## 2018-12-28 PROCEDURE — G8482 FLU IMMUNIZE ORDER/ADMIN: HCPCS | Performed by: FAMILY MEDICINE

## 2018-12-28 PROCEDURE — G8399 PT W/DXA RESULTS DOCUMENT: HCPCS | Performed by: FAMILY MEDICINE

## 2018-12-28 PROCEDURE — G8427 DOCREV CUR MEDS BY ELIG CLIN: HCPCS | Performed by: FAMILY MEDICINE

## 2018-12-28 PROCEDURE — 4040F PNEUMOC VAC/ADMIN/RCVD: CPT | Performed by: FAMILY MEDICINE

## 2018-12-28 PROCEDURE — 1036F TOBACCO NON-USER: CPT | Performed by: FAMILY MEDICINE

## 2018-12-28 PROCEDURE — G8419 CALC BMI OUT NRM PARAM NOF/U: HCPCS | Performed by: FAMILY MEDICINE

## 2018-12-28 PROCEDURE — 1123F ACP DISCUSS/DSCN MKR DOCD: CPT | Performed by: FAMILY MEDICINE

## 2018-12-28 PROCEDURE — G8598 ASA/ANTIPLAT THER USED: HCPCS | Performed by: FAMILY MEDICINE

## 2018-12-28 PROCEDURE — 1090F PRES/ABSN URINE INCON ASSESS: CPT | Performed by: FAMILY MEDICINE

## 2018-12-28 RX ORDER — METRONIDAZOLE 250 MG/1
250 TABLET ORAL 2 TIMES DAILY
Qty: 14 TABLET | Refills: 0 | Status: SHIPPED | OUTPATIENT
Start: 2018-12-28 | End: 2019-01-07

## 2018-12-28 RX ORDER — AZITHROMYCIN 250 MG/1
250 TABLET, FILM COATED ORAL SEE ADMIN INSTRUCTIONS
Qty: 6 TABLET | Refills: 0 | Status: SHIPPED | OUTPATIENT
Start: 2018-12-28 | End: 2019-01-02

## 2018-12-28 ASSESSMENT — ENCOUNTER SYMPTOMS
DIARRHEA: 1
RESPIRATORY NEGATIVE: 1
EYES NEGATIVE: 1
ALLERGIC/IMMUNOLOGIC NEGATIVE: 1

## 2018-12-28 NOTE — PROGRESS NOTES
OFFICE PROGRESS NOTE      SUBJECTIVE:        Patient ID:   Cheryle Feather is a 82 y.o. female who presents for   Chief Complaint   Patient presents with    Diarrhea     X3 weeks after eating oysters    Other     seen at ER 12-18-18           HPI:   Patient states he is having diarrhea for the last few days after eating oysters  She went to the ER and was given Z-Michael with good results  But the diarrhea started again          Prior to Admission medications    Medication Sig Start Date End Date Taking?  Authorizing Provider   B Complex Vitamins (VITAMIN B COMPLEX PO)  9/22/18  Yes Historical Provider, MD   azithromycin (ZITHROMAX) 250 MG tablet Take 1 tablet by mouth See Admin Instructions for 5 days 500mg on day 1 followed by 250mg on days 2 - 5 12/28/18 1/2/19 Yes Tyler Robins MD   metroNIDAZOLE (FLAGYL) 250 MG tablet Take 1 tablet by mouth 2 times daily for 10 days 12/28/18 1/7/19 Yes Tyler Robins MD   cloNIDine (CATAPRES) 0.1 MG tablet Take 1 tablet by mouth 2 times daily 11/29/18  Yes Apollo Piper MD   levothyroxine (SYNTHROID) 50 MCG tablet Take 1 tablet by mouth Daily 11/29/18 2/27/19 Yes Apollo Piper MD   clopidogrel (PLAVIX) 75 MG tablet Take 1 tablet by mouth daily 8/9/18 12/28/18 Yes Apollo Piper MD   CALCIUM PO Take by mouth daily   Yes Historical Provider, MD   MAGNESIUM PO Take by mouth 2 times daily   Yes Historical Provider, MD   predniSONE (DELTASONE) 5 MG tablet Take 5 mg by mouth daily  3/22/18  Yes Historical Provider, MD   Biotin 82816 MCG TBDP Take by mouth   Yes Historical Provider, MD   Methylsulfonylmethane (MSM) 1000 MG TABS  4/28/18  Yes Historical Provider, MD   FOLIC ACID PO Take by mouth   Yes Historical Provider, MD   triamterene-hydrochlorothiazide (MAXZIDE-25) 37.5-25 MG per tablet Take 0.5 tablets by mouth daily 10/11/18   Apollo Piper MD     Social History     Social History    Marital status:      Spouse name: N/A

## 2019-01-07 ENCOUNTER — OFFICE VISIT (OUTPATIENT)
Dept: PRIMARY CARE CLINIC | Age: 84
End: 2019-01-07
Payer: MEDICARE

## 2019-01-07 ENCOUNTER — HOSPITAL ENCOUNTER (OUTPATIENT)
Age: 84
Discharge: HOME | End: 2019-01-09
Payer: MEDICARE

## 2019-01-07 VITALS
TEMPERATURE: 97.9 F | DIASTOLIC BLOOD PRESSURE: 72 MMHG | HEART RATE: 62 BPM | WEIGHT: 151 LBS | HEIGHT: 62 IN | SYSTOLIC BLOOD PRESSURE: 132 MMHG | OXYGEN SATURATION: 98 % | BODY MASS INDEX: 27.79 KG/M2

## 2019-01-07 DIAGNOSIS — R19.7 DIARRHEA OF PRESUMED INFECTIOUS ORIGIN: Primary | ICD-10-CM

## 2019-01-07 DIAGNOSIS — I10 HYPERTENSION, UNSPECIFIED TYPE: ICD-10-CM

## 2019-01-07 DIAGNOSIS — E78.5 HYPERLIPIDEMIA, UNSPECIFIED HYPERLIPIDEMIA TYPE: ICD-10-CM

## 2019-01-07 DIAGNOSIS — R19.7 DIARRHEA OF PRESUMED INFECTIOUS ORIGIN: ICD-10-CM

## 2019-01-07 PROCEDURE — 87045 FECES CULTURE AEROBIC BACT: CPT

## 2019-01-07 PROCEDURE — 1090F PRES/ABSN URINE INCON ASSESS: CPT | Performed by: INTERNAL MEDICINE

## 2019-01-07 PROCEDURE — 1036F TOBACCO NON-USER: CPT | Performed by: INTERNAL MEDICINE

## 2019-01-07 PROCEDURE — 4040F PNEUMOC VAC/ADMIN/RCVD: CPT | Performed by: INTERNAL MEDICINE

## 2019-01-07 PROCEDURE — G8399 PT W/DXA RESULTS DOCUMENT: HCPCS | Performed by: INTERNAL MEDICINE

## 2019-01-07 PROCEDURE — 99213 OFFICE O/P EST LOW 20 MIN: CPT | Performed by: INTERNAL MEDICINE

## 2019-01-07 PROCEDURE — 87324 CLOSTRIDIUM AG IA: CPT

## 2019-01-07 PROCEDURE — 85025 COMPLETE CBC W/AUTO DIFF WBC: CPT

## 2019-01-07 PROCEDURE — G8482 FLU IMMUNIZE ORDER/ADMIN: HCPCS | Performed by: INTERNAL MEDICINE

## 2019-01-07 PROCEDURE — 80053 COMPREHEN METABOLIC PANEL: CPT

## 2019-01-07 PROCEDURE — G8419 CALC BMI OUT NRM PARAM NOF/U: HCPCS | Performed by: INTERNAL MEDICINE

## 2019-01-07 PROCEDURE — 1101F PT FALLS ASSESS-DOCD LE1/YR: CPT | Performed by: INTERNAL MEDICINE

## 2019-01-07 PROCEDURE — 87329 GIARDIA AG IA: CPT

## 2019-01-07 PROCEDURE — 80061 LIPID PANEL: CPT

## 2019-01-07 PROCEDURE — 1123F ACP DISCUSS/DSCN MKR DOCD: CPT | Performed by: INTERNAL MEDICINE

## 2019-01-07 PROCEDURE — 84443 ASSAY THYROID STIM HORMONE: CPT

## 2019-01-07 PROCEDURE — G8598 ASA/ANTIPLAT THER USED: HCPCS | Performed by: INTERNAL MEDICINE

## 2019-01-07 PROCEDURE — G8427 DOCREV CUR MEDS BY ELIG CLIN: HCPCS | Performed by: INTERNAL MEDICINE

## 2019-01-07 RX ORDER — METRONIDAZOLE 250 MG/1
250 TABLET ORAL 2 TIMES DAILY
Qty: 14 TABLET | Refills: 0 | Status: SHIPPED | OUTPATIENT
Start: 2019-01-07 | End: 2019-01-14

## 2019-01-08 LAB
ALBUMIN SERPL-MCNC: 3.5 G/DL (ref 3.5–5.2)
ALP BLD-CCNC: 86 U/L (ref 35–104)
ALT SERPL-CCNC: 11 U/L (ref 0–32)
ANION GAP SERPL CALCULATED.3IONS-SCNC: 15 MMOL/L (ref 7–16)
AST SERPL-CCNC: 21 U/L (ref 0–31)
BASOPHILS ABSOLUTE: 0.04 E9/L (ref 0–0.2)
BASOPHILS RELATIVE PERCENT: 0.3 % (ref 0–2)
BILIRUB SERPL-MCNC: <0.2 MG/DL (ref 0–1.2)
BUN BLDV-MCNC: 17 MG/DL (ref 8–23)
C DIFFICILE TOXIN, EIA: ABNORMAL
CALCIUM SERPL-MCNC: 9.4 MG/DL (ref 8.6–10.2)
CHLORIDE BLD-SCNC: 103 MMOL/L (ref 98–107)
CHOLESTEROL, FASTING: 152 MG/DL (ref 0–199)
CO2: 24 MMOL/L (ref 22–29)
CREAT SERPL-MCNC: 0.8 MG/DL (ref 0.5–1)
EOSINOPHILS ABSOLUTE: 0.08 E9/L (ref 0.05–0.5)
EOSINOPHILS RELATIVE PERCENT: 0.6 % (ref 0–6)
GFR AFRICAN AMERICAN: >60
GFR NON-AFRICAN AMERICAN: >60 ML/MIN/1.73
GIARDIA ANTIGEN STOOL: NORMAL
GLUCOSE FASTING: 96 MG/DL (ref 74–99)
HCT VFR BLD CALC: 40.8 % (ref 34–48)
HDLC SERPL-MCNC: 42 MG/DL
HEMOGLOBIN: 12.5 G/DL (ref 11.5–15.5)
IMMATURE GRANULOCYTES #: 0.08 E9/L
IMMATURE GRANULOCYTES %: 0.6 % (ref 0–5)
LDL CHOLESTEROL CALCULATED: 86 MG/DL (ref 0–99)
LYMPHOCYTES ABSOLUTE: 1.25 E9/L (ref 1.5–4)
LYMPHOCYTES RELATIVE PERCENT: 9.1 % (ref 20–42)
MCH RBC QN AUTO: 30.1 PG (ref 26–35)
MCHC RBC AUTO-ENTMCNC: 30.6 % (ref 32–34.5)
MCV RBC AUTO: 98.3 FL (ref 80–99.9)
MONOCYTES ABSOLUTE: 0.75 E9/L (ref 0.1–0.95)
MONOCYTES RELATIVE PERCENT: 5.5 % (ref 2–12)
NEUTROPHILS ABSOLUTE: 11.56 E9/L (ref 1.8–7.3)
NEUTROPHILS RELATIVE PERCENT: 83.9 % (ref 43–80)
PDW BLD-RTO: 14 FL (ref 11.5–15)
PLATELET # BLD: 329 E9/L (ref 130–450)
PMV BLD AUTO: 9.3 FL (ref 7–12)
POTASSIUM SERPL-SCNC: 4.1 MMOL/L (ref 3.5–5)
RBC # BLD: 4.15 E12/L (ref 3.5–5.5)
SODIUM BLD-SCNC: 142 MMOL/L (ref 132–146)
TOTAL PROTEIN: 6.4 G/DL (ref 6.4–8.3)
TRIGLYCERIDE, FASTING: 120 MG/DL (ref 0–149)
TSH SERPL DL<=0.05 MIU/L-ACNC: 1.22 UIU/ML (ref 0.27–4.2)
VLDLC SERPL CALC-MCNC: 24 MG/DL
WBC # BLD: 13.8 E9/L (ref 4.5–11.5)

## 2019-01-09 RX ORDER — VANCOMYCIN HYDROCHLORIDE 125 MG/1
125 CAPSULE ORAL 4 TIMES DAILY
Qty: 40 CAPSULE | Refills: 0 | Status: SHIPPED | OUTPATIENT
Start: 2019-01-09 | End: 2019-01-19

## 2019-01-10 ENCOUNTER — TELEPHONE (OUTPATIENT)
Dept: PRIMARY CARE CLINIC | Age: 84
End: 2019-01-10

## 2019-01-11 LAB — CULTURE, STOOL: NORMAL

## 2019-01-14 ENCOUNTER — OFFICE VISIT (OUTPATIENT)
Dept: PRIMARY CARE CLINIC | Age: 84
End: 2019-01-14
Payer: MEDICARE

## 2019-01-14 VITALS
OXYGEN SATURATION: 96 % | HEART RATE: 53 BPM | SYSTOLIC BLOOD PRESSURE: 132 MMHG | DIASTOLIC BLOOD PRESSURE: 76 MMHG | BODY MASS INDEX: 27.6 KG/M2 | HEIGHT: 62 IN | WEIGHT: 150 LBS | TEMPERATURE: 97.8 F

## 2019-01-14 DIAGNOSIS — A04.72 C. DIFFICILE COLITIS: Primary | ICD-10-CM

## 2019-01-14 PROCEDURE — 4040F PNEUMOC VAC/ADMIN/RCVD: CPT | Performed by: INTERNAL MEDICINE

## 2019-01-14 PROCEDURE — 99213 OFFICE O/P EST LOW 20 MIN: CPT | Performed by: INTERNAL MEDICINE

## 2019-01-14 PROCEDURE — G8419 CALC BMI OUT NRM PARAM NOF/U: HCPCS | Performed by: INTERNAL MEDICINE

## 2019-01-14 PROCEDURE — 1101F PT FALLS ASSESS-DOCD LE1/YR: CPT | Performed by: INTERNAL MEDICINE

## 2019-01-14 PROCEDURE — 1036F TOBACCO NON-USER: CPT | Performed by: INTERNAL MEDICINE

## 2019-01-14 PROCEDURE — G8482 FLU IMMUNIZE ORDER/ADMIN: HCPCS | Performed by: INTERNAL MEDICINE

## 2019-01-14 PROCEDURE — G8598 ASA/ANTIPLAT THER USED: HCPCS | Performed by: INTERNAL MEDICINE

## 2019-01-14 PROCEDURE — 1123F ACP DISCUSS/DSCN MKR DOCD: CPT | Performed by: INTERNAL MEDICINE

## 2019-01-14 PROCEDURE — G8427 DOCREV CUR MEDS BY ELIG CLIN: HCPCS | Performed by: INTERNAL MEDICINE

## 2019-01-14 PROCEDURE — 1090F PRES/ABSN URINE INCON ASSESS: CPT | Performed by: INTERNAL MEDICINE

## 2019-01-14 PROCEDURE — G8399 PT W/DXA RESULTS DOCUMENT: HCPCS | Performed by: INTERNAL MEDICINE

## 2019-01-14 ASSESSMENT — ENCOUNTER SYMPTOMS
SHORTNESS OF BREATH: 0
SINUS PAIN: 0
ABDOMINAL PAIN: 0
SORE THROAT: 0
NAUSEA: 0
EYE PAIN: 0
EYE DISCHARGE: 0
BLOOD IN STOOL: 0

## 2019-01-18 ENCOUNTER — HOSPITAL ENCOUNTER (OUTPATIENT)
Age: 84
Discharge: HOME | End: 2019-01-20
Payer: MEDICARE

## 2019-01-18 DIAGNOSIS — A04.72 C. DIFFICILE COLITIS: ICD-10-CM

## 2019-01-18 LAB
BASOPHILS ABSOLUTE: 0.03 E9/L (ref 0–0.2)
BASOPHILS RELATIVE PERCENT: 0.3 % (ref 0–2)
EOSINOPHILS ABSOLUTE: 0.07 E9/L (ref 0.05–0.5)
EOSINOPHILS RELATIVE PERCENT: 0.6 % (ref 0–6)
HCT VFR BLD CALC: 43.3 % (ref 34–48)
HEMOGLOBIN: 13.3 G/DL (ref 11.5–15.5)
IMMATURE GRANULOCYTES #: 0.07 E9/L
IMMATURE GRANULOCYTES %: 0.6 % (ref 0–5)
LYMPHOCYTES ABSOLUTE: 1.81 E9/L (ref 1.5–4)
LYMPHOCYTES RELATIVE PERCENT: 15.3 % (ref 20–42)
MCH RBC QN AUTO: 30 PG (ref 26–35)
MCHC RBC AUTO-ENTMCNC: 30.7 % (ref 32–34.5)
MCV RBC AUTO: 97.7 FL (ref 80–99.9)
MONOCYTES ABSOLUTE: 0.38 E9/L (ref 0.1–0.95)
MONOCYTES RELATIVE PERCENT: 3.2 % (ref 2–12)
NEUTROPHILS ABSOLUTE: 9.49 E9/L (ref 1.8–7.3)
NEUTROPHILS RELATIVE PERCENT: 80 % (ref 43–80)
PDW BLD-RTO: 14.5 FL (ref 11.5–15)
PLATELET # BLD: 290 E9/L (ref 130–450)
PMV BLD AUTO: 9.3 FL (ref 7–12)
RBC # BLD: 4.43 E12/L (ref 3.5–5.5)
WBC # BLD: 11.9 E9/L (ref 4.5–11.5)

## 2019-01-18 PROCEDURE — 85025 COMPLETE CBC W/AUTO DIFF WBC: CPT

## 2019-01-28 ENCOUNTER — HOSPITAL ENCOUNTER (OUTPATIENT)
Age: 84
Discharge: HOME | End: 2019-01-30
Payer: MEDICARE

## 2019-01-31 ENCOUNTER — OFFICE VISIT (OUTPATIENT)
Dept: PRIMARY CARE CLINIC | Age: 84
End: 2019-01-31
Payer: MEDICARE

## 2019-01-31 VITALS
WEIGHT: 150 LBS | SYSTOLIC BLOOD PRESSURE: 160 MMHG | HEART RATE: 58 BPM | HEIGHT: 62 IN | OXYGEN SATURATION: 98 % | BODY MASS INDEX: 27.6 KG/M2 | TEMPERATURE: 97.8 F | DIASTOLIC BLOOD PRESSURE: 70 MMHG

## 2019-01-31 DIAGNOSIS — I10 HYPERTENSION, UNSPECIFIED TYPE: ICD-10-CM

## 2019-01-31 DIAGNOSIS — M35.3 PMR (POLYMYALGIA RHEUMATICA) (HCC): ICD-10-CM

## 2019-01-31 DIAGNOSIS — E78.5 HYPERLIPIDEMIA, UNSPECIFIED HYPERLIPIDEMIA TYPE: ICD-10-CM

## 2019-01-31 DIAGNOSIS — A04.72 C. DIFFICILE COLITIS: Primary | ICD-10-CM

## 2019-01-31 DIAGNOSIS — Z86.73 HISTORY OF TIA (TRANSIENT ISCHEMIC ATTACK): ICD-10-CM

## 2019-01-31 PROCEDURE — G8598 ASA/ANTIPLAT THER USED: HCPCS | Performed by: INTERNAL MEDICINE

## 2019-01-31 PROCEDURE — G8419 CALC BMI OUT NRM PARAM NOF/U: HCPCS | Performed by: INTERNAL MEDICINE

## 2019-01-31 PROCEDURE — G8482 FLU IMMUNIZE ORDER/ADMIN: HCPCS | Performed by: INTERNAL MEDICINE

## 2019-01-31 PROCEDURE — 99213 OFFICE O/P EST LOW 20 MIN: CPT | Performed by: INTERNAL MEDICINE

## 2019-01-31 PROCEDURE — G8427 DOCREV CUR MEDS BY ELIG CLIN: HCPCS | Performed by: INTERNAL MEDICINE

## 2019-01-31 PROCEDURE — 1090F PRES/ABSN URINE INCON ASSESS: CPT | Performed by: INTERNAL MEDICINE

## 2019-01-31 PROCEDURE — G8399 PT W/DXA RESULTS DOCUMENT: HCPCS | Performed by: INTERNAL MEDICINE

## 2019-01-31 PROCEDURE — 1101F PT FALLS ASSESS-DOCD LE1/YR: CPT | Performed by: INTERNAL MEDICINE

## 2019-01-31 PROCEDURE — 1123F ACP DISCUSS/DSCN MKR DOCD: CPT | Performed by: INTERNAL MEDICINE

## 2019-01-31 PROCEDURE — 4040F PNEUMOC VAC/ADMIN/RCVD: CPT | Performed by: INTERNAL MEDICINE

## 2019-01-31 PROCEDURE — 1036F TOBACCO NON-USER: CPT | Performed by: INTERNAL MEDICINE

## 2019-01-31 RX ORDER — TRIAMTERENE AND HYDROCHLOROTHIAZIDE 37.5; 25 MG/1; MG/1
0.5 TABLET ORAL DAILY
Qty: 45 TABLET | Refills: 0 | Status: SHIPPED | OUTPATIENT
Start: 2019-01-31 | End: 2019-06-17

## 2019-01-31 RX ORDER — LEVOTHYROXINE SODIUM 0.05 MG/1
50 TABLET ORAL DAILY
Qty: 90 TABLET | Refills: 0 | Status: SHIPPED | OUTPATIENT
Start: 2019-01-31 | End: 2019-05-22 | Stop reason: SDUPTHER

## 2019-01-31 RX ORDER — CLONIDINE HYDROCHLORIDE 0.1 MG/1
0.1 TABLET ORAL 2 TIMES DAILY
Qty: 180 TABLET | Refills: 0 | Status: SHIPPED | OUTPATIENT
Start: 2019-01-31 | End: 2019-05-22 | Stop reason: SDUPTHER

## 2019-01-31 RX ORDER — CLOPIDOGREL BISULFATE 75 MG/1
75 TABLET ORAL DAILY
Qty: 90 TABLET | Refills: 0 | Status: SHIPPED | OUTPATIENT
Start: 2019-01-31 | End: 2019-05-22 | Stop reason: SDUPTHER

## 2019-01-31 ASSESSMENT — ENCOUNTER SYMPTOMS
EYE PAIN: 0
BLOOD IN STOOL: 0
ABDOMINAL PAIN: 0
SHORTNESS OF BREATH: 0
NAUSEA: 0
EYE DISCHARGE: 0
SORE THROAT: 0
SINUS PAIN: 0

## 2019-02-05 LAB — C-REACTIVE PROTEIN: 72.4

## 2019-02-06 ENCOUNTER — TELEPHONE (OUTPATIENT)
Dept: PRIMARY CARE CLINIC | Age: 84
End: 2019-02-06

## 2019-02-06 DIAGNOSIS — R19.7 DIARRHEA OF PRESUMED INFECTIOUS ORIGIN: Primary | ICD-10-CM

## 2019-02-06 RX ORDER — VANCOMYCIN HYDROCHLORIDE 125 MG/1
125 CAPSULE ORAL 4 TIMES DAILY
Qty: 40 CAPSULE | Refills: 0 | Status: SHIPPED | OUTPATIENT
Start: 2019-02-06 | End: 2019-02-16

## 2019-02-07 ENCOUNTER — HOSPITAL ENCOUNTER (OUTPATIENT)
Age: 84
Discharge: HOME | End: 2019-02-09
Payer: MEDICARE

## 2019-02-07 DIAGNOSIS — R19.7 DIARRHEA OF PRESUMED INFECTIOUS ORIGIN: ICD-10-CM

## 2019-02-07 PROCEDURE — 87324 CLOSTRIDIUM AG IA: CPT

## 2019-02-08 LAB — C DIFFICILE TOXIN, EIA: ABNORMAL

## 2019-02-11 ENCOUNTER — OFFICE VISIT (OUTPATIENT)
Dept: PRIMARY CARE CLINIC | Age: 84
End: 2019-02-11
Payer: MEDICARE

## 2019-02-11 VITALS
HEART RATE: 60 BPM | WEIGHT: 145 LBS | DIASTOLIC BLOOD PRESSURE: 70 MMHG | HEIGHT: 62 IN | TEMPERATURE: 97.9 F | BODY MASS INDEX: 26.68 KG/M2 | SYSTOLIC BLOOD PRESSURE: 122 MMHG | OXYGEN SATURATION: 97 %

## 2019-02-11 DIAGNOSIS — A04.72 C. DIFFICILE COLITIS: Primary | ICD-10-CM

## 2019-02-11 DIAGNOSIS — M35.3 PMR (POLYMYALGIA RHEUMATICA) (HCC): ICD-10-CM

## 2019-02-11 DIAGNOSIS — E78.49 OTHER HYPERLIPIDEMIA: ICD-10-CM

## 2019-02-11 DIAGNOSIS — E03.8 OTHER SPECIFIED HYPOTHYROIDISM: ICD-10-CM

## 2019-02-11 DIAGNOSIS — I10 ESSENTIAL HYPERTENSION: ICD-10-CM

## 2019-02-11 PROCEDURE — G8482 FLU IMMUNIZE ORDER/ADMIN: HCPCS | Performed by: INTERNAL MEDICINE

## 2019-02-11 PROCEDURE — 1101F PT FALLS ASSESS-DOCD LE1/YR: CPT | Performed by: INTERNAL MEDICINE

## 2019-02-11 PROCEDURE — G8419 CALC BMI OUT NRM PARAM NOF/U: HCPCS | Performed by: INTERNAL MEDICINE

## 2019-02-11 PROCEDURE — 4040F PNEUMOC VAC/ADMIN/RCVD: CPT | Performed by: INTERNAL MEDICINE

## 2019-02-11 PROCEDURE — 1123F ACP DISCUSS/DSCN MKR DOCD: CPT | Performed by: INTERNAL MEDICINE

## 2019-02-11 PROCEDURE — 1090F PRES/ABSN URINE INCON ASSESS: CPT | Performed by: INTERNAL MEDICINE

## 2019-02-11 PROCEDURE — G8427 DOCREV CUR MEDS BY ELIG CLIN: HCPCS | Performed by: INTERNAL MEDICINE

## 2019-02-11 PROCEDURE — G8399 PT W/DXA RESULTS DOCUMENT: HCPCS | Performed by: INTERNAL MEDICINE

## 2019-02-11 PROCEDURE — G8598 ASA/ANTIPLAT THER USED: HCPCS | Performed by: INTERNAL MEDICINE

## 2019-02-11 PROCEDURE — 99213 OFFICE O/P EST LOW 20 MIN: CPT | Performed by: INTERNAL MEDICINE

## 2019-02-11 PROCEDURE — 1036F TOBACCO NON-USER: CPT | Performed by: INTERNAL MEDICINE

## 2019-02-11 RX ORDER — VANCOMYCIN HYDROCHLORIDE 125 MG/1
CAPSULE ORAL
Qty: 35 CAPSULE | Refills: 0 | Status: SHIPPED | OUTPATIENT
Start: 2019-02-11 | End: 2019-08-19

## 2019-02-11 ASSESSMENT — PATIENT HEALTH QUESTIONNAIRE - PHQ9
SUM OF ALL RESPONSES TO PHQ9 QUESTIONS 1 & 2: 0
SUM OF ALL RESPONSES TO PHQ QUESTIONS 1-9: 0
SUM OF ALL RESPONSES TO PHQ QUESTIONS 1-9: 0
1. LITTLE INTEREST OR PLEASURE IN DOING THINGS: 0
2. FEELING DOWN, DEPRESSED OR HOPELESS: 0

## 2019-02-11 ASSESSMENT — ENCOUNTER SYMPTOMS
NAUSEA: 0
DIARRHEA: 1
EYE DISCHARGE: 0
SINUS PAIN: 0
EYE PAIN: 0
BLOOD IN STOOL: 0
SHORTNESS OF BREATH: 0
ABDOMINAL PAIN: 0
SORE THROAT: 0

## 2019-02-18 ENCOUNTER — HOSPITAL ENCOUNTER (OUTPATIENT)
Age: 84
Discharge: HOME | End: 2019-02-20
Payer: MEDICARE

## 2019-02-18 DIAGNOSIS — A04.72 C. DIFFICILE COLITIS: ICD-10-CM

## 2019-02-18 LAB
BASOPHILS ABSOLUTE: 0.05 E9/L (ref 0–0.2)
BASOPHILS RELATIVE PERCENT: 0.4 % (ref 0–2)
EOSINOPHILS ABSOLUTE: 0.01 E9/L (ref 0.05–0.5)
EOSINOPHILS RELATIVE PERCENT: 0.1 % (ref 0–6)
HCT VFR BLD CALC: 45.4 % (ref 34–48)
HEMOGLOBIN: 14.1 G/DL (ref 11.5–15.5)
IMMATURE GRANULOCYTES #: 0.12 E9/L
IMMATURE GRANULOCYTES %: 1 % (ref 0–5)
LYMPHOCYTES ABSOLUTE: 2.18 E9/L (ref 1.5–4)
LYMPHOCYTES RELATIVE PERCENT: 18 % (ref 20–42)
MCH RBC QN AUTO: 29.6 PG (ref 26–35)
MCHC RBC AUTO-ENTMCNC: 31.1 % (ref 32–34.5)
MCV RBC AUTO: 95.4 FL (ref 80–99.9)
MONOCYTES ABSOLUTE: 0.53 E9/L (ref 0.1–0.95)
MONOCYTES RELATIVE PERCENT: 4.4 % (ref 2–12)
NEUTROPHILS ABSOLUTE: 9.22 E9/L (ref 1.8–7.3)
NEUTROPHILS RELATIVE PERCENT: 76.1 % (ref 43–80)
PDW BLD-RTO: 13.8 FL (ref 11.5–15)
PLATELET # BLD: 280 E9/L (ref 130–450)
PMV BLD AUTO: 9.5 FL (ref 7–12)
RBC # BLD: 4.76 E12/L (ref 3.5–5.5)
WBC # BLD: 12.1 E9/L (ref 4.5–11.5)

## 2019-02-18 PROCEDURE — 85025 COMPLETE CBC W/AUTO DIFF WBC: CPT

## 2019-02-18 PROCEDURE — 36415 COLL VENOUS BLD VENIPUNCTURE: CPT

## 2019-02-27 ENCOUNTER — OFFICE VISIT (OUTPATIENT)
Dept: FAMILY MEDICINE CLINIC | Age: 84
End: 2019-02-27
Payer: MEDICARE

## 2019-02-27 VITALS
HEART RATE: 56 BPM | WEIGHT: 144.4 LBS | BODY MASS INDEX: 26.57 KG/M2 | DIASTOLIC BLOOD PRESSURE: 76 MMHG | TEMPERATURE: 98.4 F | RESPIRATION RATE: 16 BRPM | OXYGEN SATURATION: 98 % | HEIGHT: 62 IN | SYSTOLIC BLOOD PRESSURE: 126 MMHG

## 2019-02-27 DIAGNOSIS — E78.49 OTHER HYPERLIPIDEMIA: ICD-10-CM

## 2019-02-27 DIAGNOSIS — A04.72 C. DIFFICILE COLITIS: Primary | ICD-10-CM

## 2019-02-27 DIAGNOSIS — E03.8 OTHER SPECIFIED HYPOTHYROIDISM: ICD-10-CM

## 2019-02-27 DIAGNOSIS — I10 ESSENTIAL HYPERTENSION: ICD-10-CM

## 2019-02-27 DIAGNOSIS — M35.3 PMR (POLYMYALGIA RHEUMATICA) (HCC): ICD-10-CM

## 2019-02-27 PROCEDURE — G8598 ASA/ANTIPLAT THER USED: HCPCS | Performed by: INTERNAL MEDICINE

## 2019-02-27 PROCEDURE — 1090F PRES/ABSN URINE INCON ASSESS: CPT | Performed by: INTERNAL MEDICINE

## 2019-02-27 PROCEDURE — G8482 FLU IMMUNIZE ORDER/ADMIN: HCPCS | Performed by: INTERNAL MEDICINE

## 2019-02-27 PROCEDURE — 99213 OFFICE O/P EST LOW 20 MIN: CPT | Performed by: INTERNAL MEDICINE

## 2019-02-27 PROCEDURE — G8419 CALC BMI OUT NRM PARAM NOF/U: HCPCS | Performed by: INTERNAL MEDICINE

## 2019-02-27 PROCEDURE — G8399 PT W/DXA RESULTS DOCUMENT: HCPCS | Performed by: INTERNAL MEDICINE

## 2019-02-27 PROCEDURE — 1036F TOBACCO NON-USER: CPT | Performed by: INTERNAL MEDICINE

## 2019-02-27 PROCEDURE — G8427 DOCREV CUR MEDS BY ELIG CLIN: HCPCS | Performed by: INTERNAL MEDICINE

## 2019-02-27 PROCEDURE — 1101F PT FALLS ASSESS-DOCD LE1/YR: CPT | Performed by: INTERNAL MEDICINE

## 2019-02-27 PROCEDURE — 1123F ACP DISCUSS/DSCN MKR DOCD: CPT | Performed by: INTERNAL MEDICINE

## 2019-02-27 PROCEDURE — 4040F PNEUMOC VAC/ADMIN/RCVD: CPT | Performed by: INTERNAL MEDICINE

## 2019-02-27 ASSESSMENT — ENCOUNTER SYMPTOMS
SORE THROAT: 0
EYE DISCHARGE: 0
SHORTNESS OF BREATH: 0
ABDOMINAL PAIN: 0
NAUSEA: 0
SINUS PAIN: 0
BLOOD IN STOOL: 0
EYE PAIN: 0

## 2019-03-14 ENCOUNTER — TELEPHONE (OUTPATIENT)
Dept: FAMILY MEDICINE CLINIC | Age: 84
End: 2019-03-14

## 2019-03-18 ENCOUNTER — HOSPITAL ENCOUNTER (OUTPATIENT)
Age: 84
Discharge: HOME | End: 2019-03-20
Payer: MEDICARE

## 2019-03-18 PROCEDURE — 87324 CLOSTRIDIUM AG IA: CPT

## 2019-03-19 LAB — C DIFFICILE TOXIN, EIA: NORMAL

## 2019-03-27 ENCOUNTER — HOSPITAL ENCOUNTER (OUTPATIENT)
Age: 84
Discharge: HOME | End: 2019-03-29
Payer: MEDICARE

## 2019-03-27 DIAGNOSIS — A04.72 C. DIFFICILE COLITIS: ICD-10-CM

## 2019-03-27 LAB
BASOPHILS ABSOLUTE: 0.04 E9/L (ref 0–0.2)
BASOPHILS RELATIVE PERCENT: 0.5 % (ref 0–2)
EOSINOPHILS ABSOLUTE: 0.12 E9/L (ref 0.05–0.5)
EOSINOPHILS RELATIVE PERCENT: 1.4 % (ref 0–6)
HCT VFR BLD CALC: 42.5 % (ref 34–48)
HEMOGLOBIN: 13.6 G/DL (ref 11.5–15.5)
IMMATURE GRANULOCYTES #: 0.03 E9/L
IMMATURE GRANULOCYTES %: 0.3 % (ref 0–5)
LYMPHOCYTES ABSOLUTE: 2.08 E9/L (ref 1.5–4)
LYMPHOCYTES RELATIVE PERCENT: 23.8 % (ref 20–42)
MCH RBC QN AUTO: 29.2 PG (ref 26–35)
MCHC RBC AUTO-ENTMCNC: 32 % (ref 32–34.5)
MCV RBC AUTO: 91.2 FL (ref 80–99.9)
MONOCYTES ABSOLUTE: 0.44 E9/L (ref 0.1–0.95)
MONOCYTES RELATIVE PERCENT: 5 % (ref 2–12)
NEUTROPHILS ABSOLUTE: 6.03 E9/L (ref 1.8–7.3)
NEUTROPHILS RELATIVE PERCENT: 69 % (ref 43–80)
PDW BLD-RTO: 13.9 FL (ref 11.5–15)
PLATELET # BLD: 236 E9/L (ref 130–450)
PMV BLD AUTO: 9.5 FL (ref 7–12)
RBC # BLD: 4.66 E12/L (ref 3.5–5.5)
WBC # BLD: 8.7 E9/L (ref 4.5–11.5)

## 2019-03-27 PROCEDURE — 85025 COMPLETE CBC W/AUTO DIFF WBC: CPT

## 2019-03-27 PROCEDURE — 36415 COLL VENOUS BLD VENIPUNCTURE: CPT

## 2019-04-01 ENCOUNTER — OFFICE VISIT (OUTPATIENT)
Dept: FAMILY MEDICINE CLINIC | Age: 84
End: 2019-04-01
Payer: MEDICARE

## 2019-04-01 VITALS
TEMPERATURE: 98.1 F | BODY MASS INDEX: 27.05 KG/M2 | OXYGEN SATURATION: 98 % | HEIGHT: 62 IN | DIASTOLIC BLOOD PRESSURE: 68 MMHG | SYSTOLIC BLOOD PRESSURE: 142 MMHG | HEART RATE: 61 BPM | WEIGHT: 147 LBS

## 2019-04-01 DIAGNOSIS — A04.72 C. DIFFICILE COLITIS: Primary | ICD-10-CM

## 2019-04-01 DIAGNOSIS — E03.8 OTHER SPECIFIED HYPOTHYROIDISM: ICD-10-CM

## 2019-04-01 DIAGNOSIS — E78.49 OTHER HYPERLIPIDEMIA: ICD-10-CM

## 2019-04-01 DIAGNOSIS — I10 ESSENTIAL HYPERTENSION: ICD-10-CM

## 2019-04-01 DIAGNOSIS — M35.3 PMR (POLYMYALGIA RHEUMATICA) (HCC): ICD-10-CM

## 2019-04-01 PROCEDURE — G8598 ASA/ANTIPLAT THER USED: HCPCS | Performed by: INTERNAL MEDICINE

## 2019-04-01 PROCEDURE — 1036F TOBACCO NON-USER: CPT | Performed by: INTERNAL MEDICINE

## 2019-04-01 PROCEDURE — G8399 PT W/DXA RESULTS DOCUMENT: HCPCS | Performed by: INTERNAL MEDICINE

## 2019-04-01 PROCEDURE — 1123F ACP DISCUSS/DSCN MKR DOCD: CPT | Performed by: INTERNAL MEDICINE

## 2019-04-01 PROCEDURE — 1090F PRES/ABSN URINE INCON ASSESS: CPT | Performed by: INTERNAL MEDICINE

## 2019-04-01 PROCEDURE — 99213 OFFICE O/P EST LOW 20 MIN: CPT | Performed by: INTERNAL MEDICINE

## 2019-04-01 PROCEDURE — 4040F PNEUMOC VAC/ADMIN/RCVD: CPT | Performed by: INTERNAL MEDICINE

## 2019-04-01 PROCEDURE — G8427 DOCREV CUR MEDS BY ELIG CLIN: HCPCS | Performed by: INTERNAL MEDICINE

## 2019-04-01 PROCEDURE — G8419 CALC BMI OUT NRM PARAM NOF/U: HCPCS | Performed by: INTERNAL MEDICINE

## 2019-04-01 RX ORDER — UBIDECARENONE 100 MG
100 CAPSULE ORAL DAILY
COMMUNITY
Start: 2019-03-30 | End: 2022-09-13

## 2019-04-01 RX ORDER — COVID-19 ANTIGEN TEST
KIT MISCELLANEOUS DAILY PRN
COMMUNITY
Start: 2019-03-30 | End: 2022-05-19

## 2019-04-01 ASSESSMENT — ENCOUNTER SYMPTOMS
EYE DISCHARGE: 0
ABDOMINAL PAIN: 0
SINUS PAIN: 0
BLOOD IN STOOL: 0
SHORTNESS OF BREATH: 0
SORE THROAT: 0
EYE PAIN: 0
NAUSEA: 0

## 2019-04-01 NOTE — PROGRESS NOTES
Chief Complaint   Patient presents with    Results     Completed in March. HPI:  Patient is here for follow-up . Feel better. Following with Dr Dakotah Crabtree and Medications are reviewed and updated. Past Medical History, Surgical History, and Family History has been reviewed and updated. Review of Systems:  Review of Systems   Constitutional: Negative for chills and fever. HENT: Negative for congestion, sinus pain and sore throat. Eyes: Negative for pain and discharge. Respiratory: Negative for shortness of breath (No new SOb). Cardiovascular: Negative for chest pain. Gastrointestinal: Negative for abdominal pain, blood in stool and nausea. Genitourinary: Negative for flank pain and frequency. Musculoskeletal: Negative for neck pain. Hematological: Does not bruise/bleed easily. Psychiatric/Behavioral: Negative for suicidal ideas. Vitals:    04/01/19 1443   BP: (!) 142/68   Pulse: 61   Temp: 98.1 °F (36.7 °C)   SpO2: 98%   Weight: 147 lb (66.7 kg)   Height: 5' 2\" (1.575 m)       Physical Exam   Constitutional: She is oriented to person, place, and time. She appears well-developed and well-nourished. HENT:   Head: Normocephalic and atraumatic. Mouth/Throat: No oropharyngeal exudate. Eyes: Pupils are equal, round, and reactive to light. Conjunctivae are normal.   Neck: No JVD present. Cardiovascular: Normal rate and regular rhythm. Pulmonary/Chest: Effort normal and breath sounds normal. She has no rales. Abdominal: Soft. Bowel sounds are normal.   Musculoskeletal: Normal range of motion. She exhibits no edema. Lymphadenopathy:     She has no cervical adenopathy. Neurological: She is alert and oriented to person, place, and time. Skin: Skin is warm and dry. Psychiatric: Her behavior is normal.   Vitals reviewed.        Labs :    Lab Results   Component Value Date    WBC 8.7 03/27/2019    HGB 13.6 03/27/2019    HCT 42.5 03/27/2019     03/27/2019 CHOL 200 (H) 01/26/2018    TRIG 170 (H) 01/26/2018    HDL 42 01/07/2019    ALT 11 01/07/2019    AST 21 01/07/2019     01/07/2019    K 4.1 01/07/2019     01/07/2019    CREATININE 0.8 01/07/2019    BUN 17 01/07/2019    CO2 24 01/07/2019    TSH 1.220 01/07/2019    INR 1.0 07/21/2015    GLUF 96 01/07/2019    LABA1C 5.8 07/21/2015     Lab Results   Component Value Date    COLORU Yellow 12/18/2018    NITRU Negative 12/18/2018    GLUCOSEU Negative 12/18/2018    GLUCOSEU NEGATIVE 12/07/2011    KETUA Negative 12/18/2018    UROBILINOGEN 0.2 12/18/2018    BILIRUBINUR Negative 12/18/2018    BILIRUBINUR NEGATIVE 12/07/2011           ASSESSMENT     Patient Active Problem List    Diagnosis Date Noted    C. difficile colitis 02/11/2019    Other specified hypothyroidism 05/31/2018    Essential hypertension 02/21/2018    Other hyperlipidemia 02/21/2018     Overview Note:     Can not tolerate Statin      Myalgia 02/21/2018    PMR (polymyalgia rheumatica) ([facility] [address] 02/21/2018    Arthropathy of cervical facet joint 02/21/2018    Arthralgia of right hip 03/07/2016    Cerebral infarction [facility])      Overview Note:     Replacing Inactive Diagnoses      History of TIA (transient ischemic attack) 07/20/2015    Right cataract 02/04/2014    Left cataract 08/27/2013    Sprain of acromioclavicular ligament 06/04/2012     Overview Note:     replace inactive diagnosis      Shoulder pain 06/04/2012    Trochanteric bursitis 11/01/2011    Hip pain 11/01/2011        Diagnosis:     ICD-10-CM    1. C. difficile colitis A04.72    2. Essential hypertension I10 Comprehensive Metabolic Panel, Fasting   3. Other hyperlipidemia E78.49 Lipid, Fasting   4. Other specified hypothyroidism E03.8 TSH without Reflex   5. PMR (polymyalgia rheumatica) ([facility]) M35.3        PLAN:     Pt is improving    C diff is neg       Pt is stable on current medical treatment.    Continue current treatment plan    Side effects/Adverse effects/Precautions are reviewed with patient. Low salt, Low Carb diet an low fat diet  Continue medications as advised and take them regularly  Regular exercises as advised    Discussed natural and expected course of this diagnosis and need to alert me if symptoms do not follow expected course, or if any worse. There are no Patient Instructions on file for this visit. Return in about 2 months (around 6/1/2019).

## 2019-04-12 ENCOUNTER — TELEPHONE (OUTPATIENT)
Dept: FAMILY MEDICINE CLINIC | Age: 84
End: 2019-04-12

## 2019-04-12 NOTE — TELEPHONE ENCOUNTER
Patient will be having a colonoscopy with Dr Maldonado Age on 6/6/19. He noticed she is on Plavix and would like to know if it is ok for her to hold it for 5 days before.   Please advise and call Sara Reyez back at [phone]  At ext [phone]

## 2019-04-26 ENCOUNTER — HOSPITAL ENCOUNTER (OUTPATIENT)
Age: 84
Discharge: HOME | End: 2019-04-28
Payer: MEDICARE

## 2019-04-26 PROCEDURE — 87324 CLOSTRIDIUM AG IA: CPT

## 2019-04-27 LAB — C DIFFICILE TOXIN, EIA: ABNORMAL

## 2019-05-21 NOTE — TELEPHONE ENCOUNTER
Pt called requesting refills.     Last seen 4/1/2019  Next appt 6/3/2019  [facility]

## 2019-05-22 RX ORDER — CLOPIDOGREL BISULFATE 75 MG/1
75 TABLET ORAL DAILY
Qty: 90 TABLET | Refills: 0 | Status: SHIPPED | OUTPATIENT
Start: 2019-05-22 | End: 2019-08-19 | Stop reason: SDUPTHER

## 2019-05-22 RX ORDER — LEVOTHYROXINE SODIUM 0.05 MG/1
50 TABLET ORAL DAILY
Qty: 90 TABLET | Refills: 0 | Status: SHIPPED | OUTPATIENT
Start: 2019-05-22 | End: 2019-08-19 | Stop reason: SDUPTHER

## 2019-05-22 RX ORDER — CLONIDINE HYDROCHLORIDE 0.1 MG/1
0.1 TABLET ORAL 2 TIMES DAILY
Qty: 180 TABLET | Refills: 0 | Status: SHIPPED | OUTPATIENT
Start: 2019-05-22 | End: 2019-08-19 | Stop reason: SDUPTHER

## 2019-05-28 ENCOUNTER — HOSPITAL ENCOUNTER (OUTPATIENT)
Age: 84
Discharge: HOME | End: 2019-05-30
Payer: MEDICARE

## 2019-05-28 DIAGNOSIS — E78.49 OTHER HYPERLIPIDEMIA: ICD-10-CM

## 2019-05-28 DIAGNOSIS — E03.8 OTHER SPECIFIED HYPOTHYROIDISM: ICD-10-CM

## 2019-05-28 DIAGNOSIS — I10 ESSENTIAL HYPERTENSION: ICD-10-CM

## 2019-05-28 LAB
ALBUMIN SERPL-MCNC: 4 G/DL (ref 3.5–5.2)
ALP BLD-CCNC: 104 U/L (ref 35–104)
ALT SERPL-CCNC: 14 U/L (ref 0–32)
ANION GAP SERPL CALCULATED.3IONS-SCNC: 14 MMOL/L (ref 7–16)
AST SERPL-CCNC: 27 U/L (ref 0–31)
BILIRUB SERPL-MCNC: 0.3 MG/DL (ref 0–1.2)
BUN BLDV-MCNC: 19 MG/DL (ref 8–23)
C-REACTIVE PROTEIN: 0.2 MG/DL (ref 0–0.4)
CALCIUM SERPL-MCNC: 9.7 MG/DL (ref 8.6–10.2)
CHLORIDE BLD-SCNC: 105 MMOL/L (ref 98–107)
CHOLESTEROL, FASTING: 170 MG/DL (ref 0–199)
CO2: 23 MMOL/L (ref 22–29)
CREAT SERPL-MCNC: 0.9 MG/DL (ref 0.5–1)
GFR AFRICAN AMERICAN: >60
GFR NON-AFRICAN AMERICAN: 60 ML/MIN/1.73
GLUCOSE FASTING: 99 MG/DL (ref 74–99)
HDLC SERPL-MCNC: 53 MG/DL
LDL CHOLESTEROL CALCULATED: 102 MG/DL (ref 0–99)
POTASSIUM SERPL-SCNC: 4.3 MMOL/L (ref 3.5–5)
SEDIMENTATION RATE, ERYTHROCYTE: 7 MM/HR (ref 0–20)
SODIUM BLD-SCNC: 142 MMOL/L (ref 132–146)
TOTAL PROTEIN: 7.3 G/DL (ref 6.4–8.3)
TRIGLYCERIDE, FASTING: 73 MG/DL (ref 0–149)
TSH SERPL DL<=0.05 MIU/L-ACNC: 2.74 UIU/ML (ref 0.27–4.2)
VLDLC SERPL CALC-MCNC: 15 MG/DL

## 2019-05-28 PROCEDURE — 84443 ASSAY THYROID STIM HORMONE: CPT

## 2019-05-28 PROCEDURE — 80053 COMPREHEN METABOLIC PANEL: CPT

## 2019-05-28 PROCEDURE — 80061 LIPID PANEL: CPT

## 2019-05-28 PROCEDURE — 36415 COLL VENOUS BLD VENIPUNCTURE: CPT

## 2019-05-28 PROCEDURE — 85651 RBC SED RATE NONAUTOMATED: CPT

## 2019-05-28 PROCEDURE — 86140 C-REACTIVE PROTEIN: CPT

## 2019-06-11 ENCOUNTER — TELEPHONE (OUTPATIENT)
Dept: FAMILY MEDICINE CLINIC | Age: 84
End: 2019-06-11

## 2019-06-11 DIAGNOSIS — R19.7 DIARRHEA, UNSPECIFIED TYPE: ICD-10-CM

## 2019-06-11 DIAGNOSIS — A04.72 C. DIFFICILE COLITIS: Primary | ICD-10-CM

## 2019-06-11 RX ORDER — VANCOMYCIN HYDROCHLORIDE 125 MG/1
CAPSULE ORAL
Qty: 42 CAPSULE | Refills: 0 | Status: SHIPPED | OUTPATIENT
Start: 2019-06-11 | End: 2019-08-19

## 2019-06-11 NOTE — TELEPHONE ENCOUNTER
Spoke to Patient to advise her I have sent an Rx per Dr. Shayna Chirinos verbal request. Also ordered the patient to have a stool test for Cdiff.

## 2019-06-11 NOTE — TELEPHONE ENCOUNTER
Friday night started having Diarrhea. Dr. Kade Parikh is out of town.  Would like Vancomycin called in like in the past.   [facility]    Last OV 4/1/2019  Next OV 6/17/2019

## 2019-06-13 ENCOUNTER — HOSPITAL ENCOUNTER (OUTPATIENT)
Age: 84
Discharge: HOME | End: 2019-06-15
Payer: MEDICARE

## 2019-06-13 DIAGNOSIS — R19.7 DIARRHEA, UNSPECIFIED TYPE: ICD-10-CM

## 2019-06-13 DIAGNOSIS — A04.72 C. DIFFICILE COLITIS: ICD-10-CM

## 2019-06-13 PROCEDURE — 87324 CLOSTRIDIUM AG IA: CPT

## 2019-06-14 LAB — C DIFF TOXIN/ANTIGEN: ABNORMAL

## 2019-06-17 ENCOUNTER — OFFICE VISIT (OUTPATIENT)
Dept: FAMILY MEDICINE CLINIC | Age: 84
End: 2019-06-17
Payer: MEDICARE

## 2019-06-17 VITALS
HEART RATE: 53 BPM | DIASTOLIC BLOOD PRESSURE: 76 MMHG | WEIGHT: 139 LBS | OXYGEN SATURATION: 97 % | SYSTOLIC BLOOD PRESSURE: 144 MMHG | HEIGHT: 62 IN | BODY MASS INDEX: 25.58 KG/M2 | TEMPERATURE: 97.8 F

## 2019-06-17 DIAGNOSIS — M35.3 PMR (POLYMYALGIA RHEUMATICA) (HCC): ICD-10-CM

## 2019-06-17 DIAGNOSIS — E78.49 OTHER HYPERLIPIDEMIA: ICD-10-CM

## 2019-06-17 DIAGNOSIS — A04.71 RECURRENT COLITIS DUE TO CLOSTRIDIUM DIFFICILE: Primary | ICD-10-CM

## 2019-06-17 DIAGNOSIS — E03.8 OTHER SPECIFIED HYPOTHYROIDISM: ICD-10-CM

## 2019-06-17 DIAGNOSIS — I10 ESSENTIAL HYPERTENSION: ICD-10-CM

## 2019-06-17 PROCEDURE — 1123F ACP DISCUSS/DSCN MKR DOCD: CPT | Performed by: INTERNAL MEDICINE

## 2019-06-17 PROCEDURE — 4040F PNEUMOC VAC/ADMIN/RCVD: CPT | Performed by: INTERNAL MEDICINE

## 2019-06-17 PROCEDURE — G8419 CALC BMI OUT NRM PARAM NOF/U: HCPCS | Performed by: INTERNAL MEDICINE

## 2019-06-17 PROCEDURE — G8427 DOCREV CUR MEDS BY ELIG CLIN: HCPCS | Performed by: INTERNAL MEDICINE

## 2019-06-17 PROCEDURE — G8399 PT W/DXA RESULTS DOCUMENT: HCPCS | Performed by: INTERNAL MEDICINE

## 2019-06-17 PROCEDURE — 1036F TOBACCO NON-USER: CPT | Performed by: INTERNAL MEDICINE

## 2019-06-17 PROCEDURE — 99213 OFFICE O/P EST LOW 20 MIN: CPT | Performed by: INTERNAL MEDICINE

## 2019-06-17 PROCEDURE — G8598 ASA/ANTIPLAT THER USED: HCPCS | Performed by: INTERNAL MEDICINE

## 2019-06-17 PROCEDURE — 1090F PRES/ABSN URINE INCON ASSESS: CPT | Performed by: INTERNAL MEDICINE

## 2019-06-17 RX ORDER — VANCOMYCIN HYDROCHLORIDE 250 MG/1
CAPSULE ORAL
Refills: 1 | COMMUNITY
Start: 2019-05-14 | End: 2019-06-17

## 2019-06-17 ASSESSMENT — ENCOUNTER SYMPTOMS
EYE PAIN: 0
NAUSEA: 0
SORE THROAT: 0
SINUS PAIN: 0
SHORTNESS OF BREATH: 0
BLOOD IN STOOL: 0
EYE DISCHARGE: 0
ABDOMINAL PAIN: 0

## 2019-06-17 NOTE — PROGRESS NOTES
Chief Complaint   Patient presents with    Constipation     Diarrhea stopped 2 days ago, No BM since. Still taking medication. Test results positive for C.diff. HPI:  Patient is here for follow-up . Pt has another recurrence of C diff ( Lab Positive) , since last week. Started on Vancomycin orally, extended duration  Pt says her diarrhea has stopped. Pt has seen Dr Nora Johnson for the same     No fever or chills         Allergy and Medications are reviewed and updated. Past Medical History, Surgical History, and Family History has been reviewed and updated. Review of Systems:  Review of Systems   Constitutional: Negative for chills and fever. HENT: Negative for congestion, sinus pain and sore throat. Eyes: Negative for pain and discharge. Respiratory: Negative for shortness of breath (No new SOb). Cardiovascular: Negative for chest pain. Gastrointestinal: Negative for abdominal pain, blood in stool and nausea. Genitourinary: Negative for flank pain and frequency. Musculoskeletal: Negative for neck pain. Hematological: Does not bruise/bleed easily. Psychiatric/Behavioral: Negative for suicidal ideas. Vitals:    06/17/19 0959 06/17/19 1003   BP: (!) 144/76 (!) 144/76   Pulse: 53    Temp: 97.8 °F (36.6 °C)    SpO2: 97%    Weight: 139 lb (63 kg)    Height: 5' 2\" (1.575 m)        Physical Exam   Constitutional: She is oriented to person, place, and time. She appears well-developed and well-nourished. HENT:   Head: Normocephalic and atraumatic. Mouth/Throat: No oropharyngeal exudate. Eyes: Pupils are equal, round, and reactive to light. Conjunctivae are normal.   Neck: No JVD present. Cardiovascular: Normal rate and regular rhythm. Pulmonary/Chest: Effort normal and breath sounds normal. She has no rales. Abdominal: Soft. Bowel sounds are normal.   Musculoskeletal: Normal range of motion. She exhibits no edema. Lymphadenopathy:     She has no cervical adenopathy. Neurological: She is alert and oriented to person, place, and time. Skin: Skin is warm and dry. Psychiatric: Her behavior is normal.   Vitals reviewed. Labs :    Lab Results   Component Value Date    WBC 8.7 03/27/2019    HGB 13.6 03/27/2019    HCT 42.5 03/27/2019     03/27/2019    CHOL 200 (H) 01/26/2018    TRIG 170 (H) 01/26/2018    HDL 53 05/28/2019    ALT 14 05/28/2019    AST 27 05/28/2019     05/28/2019    K 4.3 05/28/2019     05/28/2019    CREATININE 0.9 05/28/2019    BUN 19 05/28/2019    CO2 23 05/28/2019    TSH 2.740 05/28/2019    INR 1.0 07/21/2015    GLUF 99 05/28/2019    LABA1C 5.8 07/21/2015     Lab Results   Component Value Date    COLORU Yellow 12/18/2018    NITRU Negative 12/18/2018    GLUCOSEU Negative 12/18/2018    GLUCOSEU NEGATIVE 12/07/2011    KETUA Negative 12/18/2018    UROBILINOGEN 0.2 12/18/2018    BILIRUBINUR Negative 12/18/2018    BILIRUBINUR NEGATIVE 12/07/2011           ASSESSMENT     Patient Active Problem List    Diagnosis Date Noted    C. difficile colitis 02/11/2019    Other specified hypothyroidism 05/31/2018    Essential hypertension 02/21/2018    Other hyperlipidemia 02/21/2018     Overview Note:     Can not tolerate Statin      Myalgia 02/21/2018    PMR (polymyalgia rheumatica) ([facility] [address] 02/21/2018    Arthropathy of cervical facet joint 02/21/2018    Arthralgia of right hip 03/07/2016    Cerebral infarction [facility])      Overview Note:     Replacing Inactive Diagnoses      History of TIA (transient ischemic attack) 07/20/2015    Right cataract 02/04/2014    Left cataract 08/27/2013    Sprain of acromioclavicular ligament 06/04/2012     Overview Note:     replace inactive diagnosis      Shoulder pain 06/04/2012    Trochanteric bursitis 11/01/2011    Hip pain 11/01/2011        Diagnosis:     ICD-10-CM    1. 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 Essential hypertension I10    3. Other hyperlipidemia E78.49    4.  Other

## 2019-08-19 ENCOUNTER — OFFICE VISIT (OUTPATIENT)
Dept: FAMILY MEDICINE CLINIC | Age: 84
End: 2019-08-19
Payer: MEDICARE

## 2019-08-19 VITALS
HEART RATE: 55 BPM | WEIGHT: 127 LBS | BODY MASS INDEX: 23.98 KG/M2 | HEIGHT: 61 IN | OXYGEN SATURATION: 97 % | SYSTOLIC BLOOD PRESSURE: 124 MMHG | DIASTOLIC BLOOD PRESSURE: 68 MMHG | TEMPERATURE: 97 F

## 2019-08-19 DIAGNOSIS — I10 ESSENTIAL HYPERTENSION: Primary | ICD-10-CM

## 2019-08-19 DIAGNOSIS — E03.8 OTHER SPECIFIED HYPOTHYROIDISM: ICD-10-CM

## 2019-08-19 DIAGNOSIS — E78.49 OTHER HYPERLIPIDEMIA: ICD-10-CM

## 2019-08-19 DIAGNOSIS — M35.3 PMR (POLYMYALGIA RHEUMATICA) (HCC): ICD-10-CM

## 2019-08-19 DIAGNOSIS — Z00.00 ROUTINE GENERAL MEDICAL EXAMINATION AT A HEALTH CARE FACILITY: ICD-10-CM

## 2019-08-19 PROCEDURE — 4040F PNEUMOC VAC/ADMIN/RCVD: CPT | Performed by: INTERNAL MEDICINE

## 2019-08-19 PROCEDURE — 1123F ACP DISCUSS/DSCN MKR DOCD: CPT | Performed by: INTERNAL MEDICINE

## 2019-08-19 PROCEDURE — G0438 PPPS, INITIAL VISIT: HCPCS | Performed by: INTERNAL MEDICINE

## 2019-08-19 PROCEDURE — G8598 ASA/ANTIPLAT THER USED: HCPCS | Performed by: INTERNAL MEDICINE

## 2019-08-19 RX ORDER — CLONIDINE HYDROCHLORIDE 0.1 MG/1
0.1 TABLET ORAL 2 TIMES DAILY
Qty: 180 TABLET | Refills: 0 | Status: SHIPPED | OUTPATIENT
Start: 2019-08-19 | End: 2019-10-21 | Stop reason: SDUPTHER

## 2019-08-19 RX ORDER — CLOPIDOGREL BISULFATE 75 MG/1
75 TABLET ORAL DAILY
Qty: 90 TABLET | Refills: 0 | Status: SHIPPED | OUTPATIENT
Start: 2019-08-19 | End: 2019-10-21 | Stop reason: SDUPTHER

## 2019-08-19 RX ORDER — TRIAMTERENE AND HYDROCHLOROTHIAZIDE 37.5; 25 MG/1; MG/1
TABLET ORAL
Refills: 3 | COMMUNITY
Start: 2019-08-07 | End: 2019-10-21 | Stop reason: SDUPTHER

## 2019-08-19 RX ORDER — LEVOTHYROXINE SODIUM 0.05 MG/1
50 TABLET ORAL DAILY
Qty: 90 TABLET | Refills: 0 | Status: SHIPPED | OUTPATIENT
Start: 2019-08-19 | End: 2019-10-21 | Stop reason: SDUPTHER

## 2019-08-19 RX ORDER — LOSARTAN POTASSIUM 50 MG/1
50 TABLET ORAL DAILY
Qty: 90 TABLET | Refills: 0 | Status: SHIPPED | OUTPATIENT
Start: 2019-08-19 | End: 2019-10-21 | Stop reason: SDUPTHER

## 2019-08-19 RX ORDER — LOSARTAN POTASSIUM 50 MG/1
50 TABLET ORAL DAILY
COMMUNITY
End: 2019-08-19 | Stop reason: SDUPTHER

## 2019-08-19 ASSESSMENT — PATIENT HEALTH QUESTIONNAIRE - PHQ9
SUM OF ALL RESPONSES TO PHQ QUESTIONS 1-9: 0
SUM OF ALL RESPONSES TO PHQ QUESTIONS 1-9: 0

## 2019-08-19 ASSESSMENT — LIFESTYLE VARIABLES: HOW OFTEN DO YOU HAVE A DRINK CONTAINING ALCOHOL: 0

## 2019-08-19 NOTE — PROGRESS NOTES
Medicare Annual Wellness Visit  Name: Lee Sparks Date: 2019   MRN: [id number] Sex: Female   Age: 82 y.o. Ethnicity: Non-/Non    : 1935 Race: Thania Gramajo is here for Medicare AWV    Screenings for behavioral, psychosocial and functional/safety risks, and cognitive dysfunction are all negative except as indicated below. These results, as well as other patient data from the [address] form, are documented in Flowsheets linked to this Encounter. Allergies   Allergen Reactions    Ciprofloxacin Hcl      Made me sick     Cleocin [Clindamycin Hcl] Hives     BRIGHT RED RASH    Lisinopril Other (See Comments)     Fever chills    Pcn [Penicillins]     Statins Other (See Comments)     Extreme muscle fatigue       Prior to Visit Medications    Medication Sig Taking? Authorizing Provider   levothyroxine (SYNTHROID) 50 MCG tablet Take 1 tablet by mouth Daily Yes Crystal Martinez MD   cloNIDine (CATAPRES) 0.1 MG tablet Take 1 tablet by mouth 2 times daily Yes Crystal Martinez MD   clopidogrel (PLAVIX) 75 MG tablet Take 1 tablet by mouth daily Yes Crystal Martinez MD   triamterene-hydrochlorothiazide (MAXZIDE-25) 37.5-25 MG per tablet take 1/2 tablet by mouth once a day Yes Historical Provider, MD   losartan (COZAAR) 50 MG tablet Take 1 tablet by mouth daily Yes Crystal Martinez MD   Naproxen Sodium 220 MG CAPS  Yes Historical Provider, MD   coenzyme Q10 100 MG CAPS capsule  Yes Historical Provider, MD   TURMERIC PO Take by mouth Yes Historical Provider, MD   B Complex Vitamins (VITAMIN B COMPLEX PO)  Yes Historical Provider, MD   CALCIUM PO Take by mouth daily Yes Historical Provider, MD   MAGNESIUM PO Take by mouth 2 times daily Yes Historical Provider, MD   predniSONE (DELTASONE) 5 MG tablet Take 5 mg by mouth every other day Indications:  Alternating with 2.5 mg QOD  Yes Historical Provider, MD   Biotin 08225 MCG TBDP Take by mouth Yes Historical Provider, MD lymphadenopathy  Pulmonary/Chest: clear to auscultation bilaterally- no wheezes, rales or rhonchi, normal air movement, no respiratory distress  Cardiovascular: normal rate, regular rhythm, normal S1 and S2, no murmurs, rubs, clicks, or gallops, distal pulses intact, no carotid bruits  Abdomen: soft, non-tender, non-distended, normal bowel sounds, no masses or organomegaly  Extremities: no cyanosis, clubbing or edema  Musculoskeletal: normal range of motion, no joint swelling, deformity or tenderness  Neurologic: reflexes normal and symmetric, no cranial nerve deficit, gait, coordination and speech normal    Patient's complete Health Risk Assessment and screening values have been reviewed and are found in Flowsheets. The following problems were reviewed today and where indicated follow up appointments were made and/or referrals ordered. Positive Risk Factor Screenings with Interventions:     Health Habits/Nutrition:  Health Habits/Nutrition  Do you exercise for at least 20 minutes 2-3 times per week?: Yes  Have you lost any weight without trying in the past 3 months?: (!) Yes  Do you eat fewer than 2 meals per day?: No  Have you seen a dentist within the past year?: Yes  Body mass index is 24 kg/m². Health Habits/Nutrition Interventions:  · Inadequate physical activity:  patient is not ready to increase his/her physical activity level at this time    Safety:  Safety  Do you have working smoke detectors?: Yes  Have all throw rugs been removed or fastened?: (!) No  Do you have non-slip mats or surfaces in all bathtubs/showers?: Yes  Do all of your stairways have a railing or banister?: Yes  Are your doorways, halls and stairs free of clutter?: Yes  Do you always fasten your seatbelt when you are in a car?: Yes  Safety Interventions:  · Home safety tips provided    ADL:  ADLs  In the past 7 days, did you need help from others to perform any of the following everyday activities?  Eating, dressing, grooming, bathing,

## 2019-08-27 ENCOUNTER — TELEPHONE (OUTPATIENT)
Dept: FAMILY MEDICINE CLINIC | Age: 84
End: 2019-08-27

## 2019-09-06 DIAGNOSIS — M25.551 ARTHRALGIA OF RIGHT HIP: ICD-10-CM

## 2019-09-06 DIAGNOSIS — M47.812 ARTHROPATHY OF CERVICAL FACET JOINT: Primary | ICD-10-CM

## 2019-09-06 DIAGNOSIS — M35.3 PMR (POLYMYALGIA RHEUMATICA) (HCC): ICD-10-CM

## 2019-10-15 ENCOUNTER — HOSPITAL ENCOUNTER (OUTPATIENT)
Age: 84
Discharge: HOME | End: 2019-10-17
Payer: MEDICARE

## 2019-10-15 DIAGNOSIS — I10 ESSENTIAL HYPERTENSION: ICD-10-CM

## 2019-10-15 DIAGNOSIS — E03.8 OTHER SPECIFIED HYPOTHYROIDISM: ICD-10-CM

## 2019-10-15 DIAGNOSIS — E78.49 OTHER HYPERLIPIDEMIA: ICD-10-CM

## 2019-10-15 LAB
ALBUMIN SERPL-MCNC: 4 G/DL (ref 3.5–5.2)
ALP BLD-CCNC: 132 U/L (ref 35–104)
ALT SERPL-CCNC: 6 U/L (ref 0–32)
ANION GAP SERPL CALCULATED.3IONS-SCNC: 13 MMOL/L (ref 7–16)
AST SERPL-CCNC: 22 U/L (ref 0–31)
BACTERIA: ABNORMAL /HPF
BASOPHILS ABSOLUTE: 0.03 E9/L (ref 0–0.2)
BASOPHILS RELATIVE PERCENT: 0.4 % (ref 0–2)
BILIRUB SERPL-MCNC: 0.2 MG/DL (ref 0–1.2)
BILIRUBIN URINE: NEGATIVE
BLOOD, URINE: ABNORMAL
BUN BLDV-MCNC: 28 MG/DL (ref 8–23)
CALCIUM SERPL-MCNC: 10 MG/DL (ref 8.6–10.2)
CHLORIDE BLD-SCNC: 103 MMOL/L (ref 98–107)
CHOLESTEROL, FASTING: 197 MG/DL (ref 0–199)
CLARITY: ABNORMAL
CO2: 25 MMOL/L (ref 22–29)
COLOR: YELLOW
CREAT SERPL-MCNC: 0.9 MG/DL (ref 0.5–1)
EOSINOPHILS ABSOLUTE: 0.22 E9/L (ref 0.05–0.5)
EOSINOPHILS RELATIVE PERCENT: 2.7 % (ref 0–6)
GFR AFRICAN AMERICAN: >60
GFR NON-AFRICAN AMERICAN: 60 ML/MIN/1.73
GLUCOSE FASTING: 83 MG/DL (ref 74–99)
GLUCOSE URINE: NEGATIVE MG/DL
HCT VFR BLD CALC: 46.9 % (ref 34–48)
HDLC SERPL-MCNC: 46 MG/DL
HEMOGLOBIN: 14.3 G/DL (ref 11.5–15.5)
IMMATURE GRANULOCYTES #: 0.02 E9/L
IMMATURE GRANULOCYTES %: 0.2 % (ref 0–5)
KETONES, URINE: NEGATIVE MG/DL
LDL CHOLESTEROL CALCULATED: 120 MG/DL (ref 0–99)
LEUKOCYTE ESTERASE, URINE: ABNORMAL
LYMPHOCYTES ABSOLUTE: 2.64 E9/L (ref 1.5–4)
LYMPHOCYTES RELATIVE PERCENT: 31.8 % (ref 20–42)
MCH RBC QN AUTO: 29.1 PG (ref 26–35)
MCHC RBC AUTO-ENTMCNC: 30.5 % (ref 32–34.5)
MCV RBC AUTO: 95.5 FL (ref 80–99.9)
MONOCYTES ABSOLUTE: 0.64 E9/L (ref 0.1–0.95)
MONOCYTES RELATIVE PERCENT: 7.7 % (ref 2–12)
NEUTROPHILS ABSOLUTE: 4.74 E9/L (ref 1.8–7.3)
NEUTROPHILS RELATIVE PERCENT: 57.2 % (ref 43–80)
NITRITE, URINE: POSITIVE
PDW BLD-RTO: 13.6 FL (ref 11.5–15)
PH UA: 6 (ref 5–9)
PLATELET # BLD: 239 E9/L (ref 130–450)
PMV BLD AUTO: 9.9 FL (ref 7–12)
POTASSIUM SERPL-SCNC: 4.2 MMOL/L (ref 3.5–5)
PROTEIN UA: NEGATIVE MG/DL
RBC # BLD: 4.91 E12/L (ref 3.5–5.5)
RBC UA: ABNORMAL /HPF (ref 0–2)
SODIUM BLD-SCNC: 141 MMOL/L (ref 132–146)
SPECIFIC GRAVITY UA: 1.02 (ref 1–1.03)
TOTAL PROTEIN: 7.2 G/DL (ref 6.4–8.3)
TRIGLYCERIDE, FASTING: 153 MG/DL (ref 0–149)
TSH SERPL DL<=0.05 MIU/L-ACNC: 2.52 UIU/ML (ref 0.27–4.2)
UROBILINOGEN, URINE: 0.2 E.U./DL
VLDLC SERPL CALC-MCNC: 31 MG/DL
WBC # BLD: 8.3 E9/L (ref 4.5–11.5)
WBC UA: >20 /HPF (ref 0–5)

## 2019-10-15 PROCEDURE — 84443 ASSAY THYROID STIM HORMONE: CPT

## 2019-10-15 PROCEDURE — 81001 URINALYSIS AUTO W/SCOPE: CPT

## 2019-10-15 PROCEDURE — 80061 LIPID PANEL: CPT

## 2019-10-15 PROCEDURE — 36415 COLL VENOUS BLD VENIPUNCTURE: CPT

## 2019-10-15 PROCEDURE — 80053 COMPREHEN METABOLIC PANEL: CPT

## 2019-10-15 PROCEDURE — 85025 COMPLETE CBC W/AUTO DIFF WBC: CPT

## 2019-10-21 ENCOUNTER — OFFICE VISIT (OUTPATIENT)
Dept: FAMILY MEDICINE CLINIC | Age: 84
End: 2019-10-21
Payer: MEDICARE

## 2019-10-21 VITALS
OXYGEN SATURATION: 97 % | DIASTOLIC BLOOD PRESSURE: 62 MMHG | SYSTOLIC BLOOD PRESSURE: 130 MMHG | BODY MASS INDEX: 26.43 KG/M2 | HEIGHT: 61 IN | HEART RATE: 64 BPM | TEMPERATURE: 97.8 F | WEIGHT: 140 LBS

## 2019-10-21 DIAGNOSIS — E03.8 OTHER SPECIFIED HYPOTHYROIDISM: ICD-10-CM

## 2019-10-21 DIAGNOSIS — E78.49 OTHER HYPERLIPIDEMIA: ICD-10-CM

## 2019-10-21 DIAGNOSIS — M35.3 PMR (POLYMYALGIA RHEUMATICA) (HCC): ICD-10-CM

## 2019-10-21 DIAGNOSIS — I10 ESSENTIAL HYPERTENSION: ICD-10-CM

## 2019-10-21 DIAGNOSIS — R30.0 DYSURIA: Primary | ICD-10-CM

## 2019-10-21 DIAGNOSIS — Z12.39 BREAST CANCER SCREENING: ICD-10-CM

## 2019-10-21 PROCEDURE — 1123F ACP DISCUSS/DSCN MKR DOCD: CPT | Performed by: INTERNAL MEDICINE

## 2019-10-21 PROCEDURE — 4040F PNEUMOC VAC/ADMIN/RCVD: CPT | Performed by: INTERNAL MEDICINE

## 2019-10-21 PROCEDURE — G8417 CALC BMI ABV UP PARAM F/U: HCPCS | Performed by: INTERNAL MEDICINE

## 2019-10-21 PROCEDURE — G8427 DOCREV CUR MEDS BY ELIG CLIN: HCPCS | Performed by: INTERNAL MEDICINE

## 2019-10-21 PROCEDURE — 99213 OFFICE O/P EST LOW 20 MIN: CPT | Performed by: INTERNAL MEDICINE

## 2019-10-21 PROCEDURE — G8399 PT W/DXA RESULTS DOCUMENT: HCPCS | Performed by: INTERNAL MEDICINE

## 2019-10-21 PROCEDURE — 1090F PRES/ABSN URINE INCON ASSESS: CPT | Performed by: INTERNAL MEDICINE

## 2019-10-21 PROCEDURE — G8484 FLU IMMUNIZE NO ADMIN: HCPCS | Performed by: INTERNAL MEDICINE

## 2019-10-21 PROCEDURE — G8598 ASA/ANTIPLAT THER USED: HCPCS | Performed by: INTERNAL MEDICINE

## 2019-10-21 PROCEDURE — 1036F TOBACCO NON-USER: CPT | Performed by: INTERNAL MEDICINE

## 2019-10-21 RX ORDER — LEVOTHYROXINE SODIUM 0.05 MG/1
50 TABLET ORAL DAILY
Qty: 90 TABLET | Refills: 0 | Status: SHIPPED
Start: 2019-10-21 | End: 2020-02-24 | Stop reason: SDUPTHER

## 2019-10-21 RX ORDER — LOSARTAN POTASSIUM 50 MG/1
50 TABLET ORAL DAILY
Qty: 90 TABLET | Refills: 0 | Status: SHIPPED
Start: 2019-10-21 | End: 2020-02-24 | Stop reason: SDUPTHER

## 2019-10-21 RX ORDER — CLOPIDOGREL BISULFATE 75 MG/1
75 TABLET ORAL DAILY
Qty: 90 TABLET | Refills: 0 | Status: SHIPPED
Start: 2019-10-21 | End: 2020-02-24 | Stop reason: SDUPTHER

## 2019-10-21 RX ORDER — NITROFURANTOIN 25; 75 MG/1; MG/1
100 CAPSULE ORAL 2 TIMES DAILY
Qty: 10 CAPSULE | Refills: 0 | Status: SHIPPED | OUTPATIENT
Start: 2019-10-21 | End: 2019-10-26

## 2019-10-21 RX ORDER — TRIAMTERENE AND HYDROCHLOROTHIAZIDE 37.5; 25 MG/1; MG/1
TABLET ORAL
Qty: 30 TABLET | Refills: 3 | Status: SHIPPED
Start: 2019-10-21 | End: 2020-05-16 | Stop reason: SDUPTHER

## 2019-10-21 RX ORDER — CLONIDINE HYDROCHLORIDE 0.1 MG/1
0.1 TABLET ORAL 2 TIMES DAILY
Qty: 180 TABLET | Refills: 0 | Status: SHIPPED
Start: 2019-10-21 | End: 2020-02-24 | Stop reason: SDUPTHER

## 2019-10-21 ASSESSMENT — ENCOUNTER SYMPTOMS
NAUSEA: 0
SORE THROAT: 0
SINUS PAIN: 0
BLOOD IN STOOL: 0
EYE PAIN: 0
SHORTNESS OF BREATH: 0
ABDOMINAL PAIN: 0
EYE DISCHARGE: 0

## 2019-11-20 ENCOUNTER — HOSPITAL ENCOUNTER (OUTPATIENT)
Dept: MAMMOGRAPHY | Age: 84
Discharge: HOME | End: 2019-11-22
Payer: MEDICARE

## 2019-11-20 DIAGNOSIS — Z12.39 BREAST CANCER SCREENING: ICD-10-CM

## 2019-11-20 PROCEDURE — 77067 SCR MAMMO BI INCL CAD: CPT

## 2019-12-23 ENCOUNTER — OFFICE VISIT (OUTPATIENT)
Dept: FAMILY MEDICINE CLINIC | Age: 84
End: 2019-12-23
Payer: MEDICARE

## 2019-12-23 ENCOUNTER — HOSPITAL ENCOUNTER (OUTPATIENT)
Age: 84
Discharge: HOME | End: 2019-12-25
Payer: MEDICARE

## 2019-12-23 VITALS
OXYGEN SATURATION: 98 % | HEIGHT: 61 IN | BODY MASS INDEX: 27.56 KG/M2 | WEIGHT: 146 LBS | DIASTOLIC BLOOD PRESSURE: 82 MMHG | SYSTOLIC BLOOD PRESSURE: 152 MMHG | HEART RATE: 59 BPM

## 2019-12-23 DIAGNOSIS — N39.0 URINARY TRACT INFECTION WITHOUT HEMATURIA, SITE UNSPECIFIED: ICD-10-CM

## 2019-12-23 DIAGNOSIS — N39.0 URINARY TRACT INFECTION WITHOUT HEMATURIA, SITE UNSPECIFIED: Primary | ICD-10-CM

## 2019-12-23 DIAGNOSIS — M35.3 PMR (POLYMYALGIA RHEUMATICA) (HCC): ICD-10-CM

## 2019-12-23 DIAGNOSIS — I10 ESSENTIAL HYPERTENSION: ICD-10-CM

## 2019-12-23 DIAGNOSIS — R30.0 DYSURIA: ICD-10-CM

## 2019-12-23 DIAGNOSIS — E78.49 OTHER HYPERLIPIDEMIA: ICD-10-CM

## 2019-12-23 DIAGNOSIS — E03.8 OTHER SPECIFIED HYPOTHYROIDISM: ICD-10-CM

## 2019-12-23 PROCEDURE — G8427 DOCREV CUR MEDS BY ELIG CLIN: HCPCS | Performed by: INTERNAL MEDICINE

## 2019-12-23 PROCEDURE — G8484 FLU IMMUNIZE NO ADMIN: HCPCS | Performed by: INTERNAL MEDICINE

## 2019-12-23 PROCEDURE — 4040F PNEUMOC VAC/ADMIN/RCVD: CPT | Performed by: INTERNAL MEDICINE

## 2019-12-23 PROCEDURE — G8399 PT W/DXA RESULTS DOCUMENT: HCPCS | Performed by: INTERNAL MEDICINE

## 2019-12-23 PROCEDURE — 87088 URINE BACTERIA CULTURE: CPT

## 2019-12-23 PROCEDURE — G8598 ASA/ANTIPLAT THER USED: HCPCS | Performed by: INTERNAL MEDICINE

## 2019-12-23 PROCEDURE — 99213 OFFICE O/P EST LOW 20 MIN: CPT | Performed by: INTERNAL MEDICINE

## 2019-12-23 PROCEDURE — G0008 ADMIN INFLUENZA VIRUS VAC: HCPCS | Performed by: INTERNAL MEDICINE

## 2019-12-23 PROCEDURE — 1123F ACP DISCUSS/DSCN MKR DOCD: CPT | Performed by: INTERNAL MEDICINE

## 2019-12-23 PROCEDURE — G8417 CALC BMI ABV UP PARAM F/U: HCPCS | Performed by: INTERNAL MEDICINE

## 2019-12-23 PROCEDURE — 1036F TOBACCO NON-USER: CPT | Performed by: INTERNAL MEDICINE

## 2019-12-23 PROCEDURE — 87186 SC STD MICRODIL/AGAR DIL: CPT

## 2019-12-23 PROCEDURE — 90653 IIV ADJUVANT VACCINE IM: CPT | Performed by: INTERNAL MEDICINE

## 2019-12-23 PROCEDURE — 1090F PRES/ABSN URINE INCON ASSESS: CPT | Performed by: INTERNAL MEDICINE

## 2019-12-23 RX ORDER — NITROFURANTOIN 25; 75 MG/1; MG/1
100 CAPSULE ORAL 2 TIMES DAILY
Qty: 6 CAPSULE | Refills: 0 | Status: SHIPPED | OUTPATIENT
Start: 2019-12-23 | End: 2019-12-26

## 2019-12-23 ASSESSMENT — ENCOUNTER SYMPTOMS
SINUS PAIN: 0
SHORTNESS OF BREATH: 0
ABDOMINAL PAIN: 0
EYE DISCHARGE: 0
EYE PAIN: 0
NAUSEA: 0
SORE THROAT: 0
BLOOD IN STOOL: 0

## 2019-12-26 LAB
ORGANISM: ABNORMAL
URINE CULTURE, ROUTINE: ABNORMAL
URINE CULTURE, ROUTINE: ABNORMAL

## 2020-01-30 ENCOUNTER — TELEPHONE (OUTPATIENT)
Dept: FAMILY MEDICINE CLINIC | Age: 85
End: 2020-01-30

## 2020-01-30 NOTE — TELEPHONE ENCOUNTER
Pt called to let us know she has seen her urine results on mychart. She also wants to let you know that she is having diarrhea again.

## 2020-01-31 ENCOUNTER — HOSPITAL ENCOUNTER (OUTPATIENT)
Age: 85
Discharge: HOME | End: 2020-02-02
Payer: MEDICARE

## 2020-02-01 PROCEDURE — 87324 CLOSTRIDIUM AG IA: CPT

## 2020-02-01 PROCEDURE — 87449 NOS EACH ORGANISM AG IA: CPT

## 2020-02-02 LAB — C DIFF TOXIN/ANTIGEN: NORMAL

## 2020-02-11 ENCOUNTER — TELEPHONE (OUTPATIENT)
Dept: FAMILY MEDICINE CLINIC | Age: 85
End: 2020-02-11

## 2020-02-12 NOTE — TELEPHONE ENCOUNTER
Returned message to patient. LYNDSAY on her machine stating \"results were negative for the test in question\".

## 2020-02-24 ENCOUNTER — OFFICE VISIT (OUTPATIENT)
Dept: FAMILY MEDICINE CLINIC | Age: 85
End: 2020-02-24
Payer: MEDICARE

## 2020-02-24 VITALS
OXYGEN SATURATION: 96 % | BODY MASS INDEX: 27.42 KG/M2 | HEIGHT: 62 IN | DIASTOLIC BLOOD PRESSURE: 66 MMHG | HEART RATE: 51 BPM | SYSTOLIC BLOOD PRESSURE: 124 MMHG | WEIGHT: 149 LBS | TEMPERATURE: 98.1 F

## 2020-02-24 PROCEDURE — 99214 OFFICE O/P EST MOD 30 MIN: CPT | Performed by: INTERNAL MEDICINE

## 2020-02-24 PROCEDURE — 4040F PNEUMOC VAC/ADMIN/RCVD: CPT | Performed by: INTERNAL MEDICINE

## 2020-02-24 PROCEDURE — 1090F PRES/ABSN URINE INCON ASSESS: CPT | Performed by: INTERNAL MEDICINE

## 2020-02-24 PROCEDURE — G8417 CALC BMI ABV UP PARAM F/U: HCPCS | Performed by: INTERNAL MEDICINE

## 2020-02-24 PROCEDURE — G8399 PT W/DXA RESULTS DOCUMENT: HCPCS | Performed by: INTERNAL MEDICINE

## 2020-02-24 PROCEDURE — G8427 DOCREV CUR MEDS BY ELIG CLIN: HCPCS | Performed by: INTERNAL MEDICINE

## 2020-02-24 PROCEDURE — G8482 FLU IMMUNIZE ORDER/ADMIN: HCPCS | Performed by: INTERNAL MEDICINE

## 2020-02-24 PROCEDURE — 1123F ACP DISCUSS/DSCN MKR DOCD: CPT | Performed by: INTERNAL MEDICINE

## 2020-02-24 PROCEDURE — 1036F TOBACCO NON-USER: CPT | Performed by: INTERNAL MEDICINE

## 2020-02-24 RX ORDER — CLOPIDOGREL BISULFATE 75 MG/1
75 TABLET ORAL DAILY
Qty: 90 TABLET | Refills: 0 | Status: SHIPPED
Start: 2020-02-24 | End: 2020-11-30 | Stop reason: SDUPTHER

## 2020-02-24 RX ORDER — LEVOTHYROXINE SODIUM 0.05 MG/1
50 TABLET ORAL DAILY
Qty: 90 TABLET | Refills: 0 | Status: SHIPPED
Start: 2020-02-24 | End: 2020-05-16 | Stop reason: SDUPTHER

## 2020-02-24 RX ORDER — LOSARTAN POTASSIUM 50 MG/1
50 TABLET ORAL DAILY
Qty: 90 TABLET | Refills: 0 | Status: SHIPPED
Start: 2020-02-24 | End: 2020-08-06 | Stop reason: SDUPTHER

## 2020-02-24 RX ORDER — AZITHROMYCIN 500 MG/1
500 TABLET, FILM COATED ORAL DAILY
Qty: 3 TABLET | Refills: 0 | Status: SHIPPED | OUTPATIENT
Start: 2020-02-24 | End: 2020-02-27

## 2020-02-24 RX ORDER — CLONIDINE HYDROCHLORIDE 0.1 MG/1
0.1 TABLET ORAL 2 TIMES DAILY
Qty: 180 TABLET | Refills: 0 | Status: SHIPPED
Start: 2020-02-24 | End: 2020-05-16 | Stop reason: SDUPTHER

## 2020-02-24 ASSESSMENT — ENCOUNTER SYMPTOMS
EYE DISCHARGE: 0
NAUSEA: 0
SHORTNESS OF BREATH: 0
DIARRHEA: 1
BLOOD IN STOOL: 0
SORE THROAT: 0
SINUS PAIN: 0
ABDOMINAL PAIN: 0
EYE PAIN: 0

## 2020-02-24 ASSESSMENT — PATIENT HEALTH QUESTIONNAIRE - PHQ9
1. LITTLE INTEREST OR PLEASURE IN DOING THINGS: 0
SUM OF ALL RESPONSES TO PHQ QUESTIONS 1-9: 0
2. FEELING DOWN, DEPRESSED OR HOPELESS: 0
SUM OF ALL RESPONSES TO PHQ9 QUESTIONS 1 & 2: 0
SUM OF ALL RESPONSES TO PHQ QUESTIONS 1-9: 0

## 2020-02-24 NOTE — PROGRESS NOTES
Chief Complaint   Patient presents with    Urinary Tract Infection     c/o cloudy urine with Strong odor.  Diarrhea     Still having diarrhea x 6 weeks. Pt concerns this has to do with the Blas lettuce she ate during the recall. HPI:  Patient is here for follow-up     Has diarrhea for 6 weeks    Stool c diff neg    No fever or chills      Allergy and Medications are reviewed and updated. Past Medical History, Surgical History, and Family History has been reviewed and updated. Review of Systems:  Review of Systems   Constitutional: Negative for chills and fever. HENT: Negative for congestion, sinus pain and sore throat. Eyes: Negative for pain and discharge. Respiratory: Negative for shortness of breath (No new SOb). Cardiovascular: Negative for chest pain. Gastrointestinal: Positive for diarrhea. Negative for abdominal pain, blood in stool and nausea. Genitourinary: Negative for flank pain and frequency. Musculoskeletal: Negative for neck pain. Hematological: Does not bruise/bleed easily. Psychiatric/Behavioral: Negative for suicidal ideas. Vitals:    02/24/20 1059   BP: 124/66   Pulse: 51   Temp: 98.1 °F (36.7 °C)   SpO2: 96%   Weight: 149 lb (67.6 kg)   Height: 5' 2\" (1.575 m)       Physical Exam  Vitals signs reviewed. Constitutional:       Appearance: She is well-developed. HENT:      Head: Normocephalic and atraumatic. Mouth/Throat:      Pharynx: No oropharyngeal exudate. Eyes:      Conjunctiva/sclera: Conjunctivae normal.      Pupils: Pupils are equal, round, and reactive to light. Neck:      Vascular: No JVD. Cardiovascular:      Rate and Rhythm: Normal rate and regular rhythm. Pulmonary:      Effort: Pulmonary effort is normal.      Breath sounds: Normal breath sounds. No rales. Abdominal:      General: Bowel sounds are normal.      Palpations: Abdomen is soft. Musculoskeletal: Normal range of motion.    Lymphadenopathy:      Cervical: No cervical adenopathy. Skin:     General: Skin is warm and dry. Neurological:      Mental Status: She is alert and oriented to person, place, and time. Psychiatric:         Behavior: Behavior normal.          Labs :    Lab Results   Component Value Date    WBC 8.3 10/15/2019    HGB 14.3 10/15/2019    HCT 46.9 10/15/2019     10/15/2019    CHOL 200 (H) 01/26/2018    TRIG 170 (H) 01/26/2018    HDL 46 10/15/2019    ALT 6 10/15/2019    AST 22 10/15/2019     10/15/2019    K 4.2 10/15/2019     10/15/2019    CREATININE 0.9 10/15/2019    BUN 28 (H) 10/15/2019    CO2 25 10/15/2019    TSH 2.520 10/15/2019    INR 1.0 07/21/2015    GLUF 83 10/15/2019    LABA1C 5.8 07/21/2015     Lab Results   Component Value Date    COLORU Yellow 10/15/2019    NITRU POSITIVE 10/15/2019    GLUCOSEU Negative 10/15/2019    GLUCOSEU NEGATIVE 12/07/2011    KETUA Negative 10/15/2019    UROBILINOGEN 0.2 10/15/2019    BILIRUBINUR Negative 10/15/2019    [address] [place] [address] NEGATIVE 12/07/2011           ASSESSMENT     Patient Active Problem List    Diagnosis Date Noted    C. difficile colitis 02/11/2019    Other specified hypothyroidism 05/31/2018    Essential hypertension 02/21/2018    Other hyperlipidemia 02/21/2018     Overview Note:     Can not tolerate Statin      Myalgia 02/21/2018    PMR (polymyalgia rheumatica) ([facility] [address] 02/21/2018    Arthropathy of cervical facet joint 02/21/2018    Arthralgia of right hip 03/07/2016    Cerebral infarction [facility])      Overview Note:     Replacing Inactive Diagnoses      History of TIA (transient ischemic attack) 07/20/2015    Right cataract 02/04/2014    Left cataract 08/27/2013    Sprain of acromioclavicular ligament 06/04/2012     Overview Note:     replace inactive diagnosis      Shoulder pain 06/04/2012    Trochanteric bursitis 11/01/2011    Hip pain 11/01/2011        Diagnosis:     ICD-10-CM    1. Diarrhea, unspecified type R19.7 CULTURE STOOL   2.  Essential hypertension I10 CBC Auto Differential     Comprehensive Metabolic Panel, Fasting     Urinalysis with Microscopic   3. Other hyperlipidemia E78.49 Lipid, Fasting   4. Other specified hypothyroidism E03.8 TSH without Reflex   5. PMR (polymyalgia rheumatica) (HCC) M35.3        PLAN:      Stool for culture    Zithromax 500 mg x 3 days    Pt is stable on current medical treatment. Continue current treatment plan    Side effects/Adverse effects/Precautions are reviewed with patient. Low salt, Low Carb diet an low fat diet  Continue medications as advised and take them regularly  Regular exercises as advised    Discussed natural and expected course of this diagnosis and need to alert me if symptoms do not follow expected course, or if any worse. Smoking cessation if applicable, discussed with patient. Advise to have ( Fasting) lab test prior to next visit. CBC CMP LIPID TSH UA     There are no Patient Instructions on file for this visit. Return in about 2 months (around 4/24/2020).

## 2020-02-26 ENCOUNTER — HOSPITAL ENCOUNTER (OUTPATIENT)
Age: 85
Discharge: HOME | End: 2020-02-28
Payer: MEDICARE

## 2020-02-26 PROCEDURE — 87077 CULTURE AEROBIC IDENTIFY: CPT

## 2020-02-26 PROCEDURE — 87045 FECES CULTURE AEROBIC BACT: CPT

## 2020-02-27 ENCOUNTER — HOSPITAL ENCOUNTER (OUTPATIENT)
Age: 85
Discharge: HOME | End: 2020-02-29
Payer: MEDICARE

## 2020-02-27 LAB
ALBUMIN SERPL-MCNC: 3.9 G/DL (ref 3.5–5.2)
ALP BLD-CCNC: 103 U/L (ref 35–104)
ALT SERPL-CCNC: 10 U/L (ref 0–32)
ANION GAP SERPL CALCULATED.3IONS-SCNC: 14 MMOL/L (ref 7–16)
AST SERPL-CCNC: 18 U/L (ref 0–31)
BACTERIA: ABNORMAL /HPF
BASOPHILS ABSOLUTE: 0.02 E9/L (ref 0–0.2)
BASOPHILS RELATIVE PERCENT: 0.2 % (ref 0–2)
BILIRUB SERPL-MCNC: <0.2 MG/DL (ref 0–1.2)
BILIRUBIN URINE: NEGATIVE
BLOOD, URINE: NEGATIVE
BUN BLDV-MCNC: 31 MG/DL (ref 8–23)
CALCIUM SERPL-MCNC: 9.7 MG/DL (ref 8.6–10.2)
CHLORIDE BLD-SCNC: 104 MMOL/L (ref 98–107)
CHOLESTEROL, FASTING: 205 MG/DL (ref 0–199)
CLARITY: CLEAR
CO2: 24 MMOL/L (ref 22–29)
COLOR: YELLOW
CREAT SERPL-MCNC: 1 MG/DL (ref 0.5–1)
EOSINOPHILS ABSOLUTE: 0.18 E9/L (ref 0.05–0.5)
EOSINOPHILS RELATIVE PERCENT: 2.2 % (ref 0–6)
EPITHELIAL CELLS, UA: ABNORMAL /HPF
GFR AFRICAN AMERICAN: >60
GFR NON-AFRICAN AMERICAN: 53 ML/MIN/1.73
GLUCOSE FASTING: 93 MG/DL (ref 74–99)
GLUCOSE URINE: NEGATIVE MG/DL
HCT VFR BLD CALC: 43.5 % (ref 34–48)
HDLC SERPL-MCNC: 45 MG/DL
HEMOGLOBIN: 13.4 G/DL (ref 11.5–15.5)
IMMATURE GRANULOCYTES #: 0.02 E9/L
IMMATURE GRANULOCYTES %: 0.2 % (ref 0–5)
KETONES, URINE: NEGATIVE MG/DL
LDL CHOLESTEROL CALCULATED: 135 MG/DL (ref 0–99)
LEUKOCYTE ESTERASE, URINE: ABNORMAL
LYMPHOCYTES ABSOLUTE: 2.92 E9/L (ref 1.5–4)
LYMPHOCYTES RELATIVE PERCENT: 35.7 % (ref 20–42)
MCH RBC QN AUTO: 29.6 PG (ref 26–35)
MCHC RBC AUTO-ENTMCNC: 30.8 % (ref 32–34.5)
MCV RBC AUTO: 96 FL (ref 80–99.9)
MONOCYTES ABSOLUTE: 0.65 E9/L (ref 0.1–0.95)
MONOCYTES RELATIVE PERCENT: 8 % (ref 2–12)
NEUTROPHILS ABSOLUTE: 4.38 E9/L (ref 1.8–7.3)
NEUTROPHILS RELATIVE PERCENT: 53.7 % (ref 43–80)
NITRITE, URINE: POSITIVE
PDW BLD-RTO: 13 FL (ref 11.5–15)
PH UA: 6 (ref 5–9)
PLATELET # BLD: 277 E9/L (ref 130–450)
PMV BLD AUTO: 9.9 FL (ref 7–12)
POTASSIUM SERPL-SCNC: 4 MMOL/L (ref 3.5–5)
PROTEIN UA: NEGATIVE MG/DL
RBC # BLD: 4.53 E12/L (ref 3.5–5.5)
RBC UA: ABNORMAL /HPF (ref 0–2)
SODIUM BLD-SCNC: 142 MMOL/L (ref 132–146)
SPECIFIC GRAVITY UA: 1.02 (ref 1–1.03)
TOTAL PROTEIN: 7.2 G/DL (ref 6.4–8.3)
TRIGLYCERIDE, FASTING: 124 MG/DL (ref 0–149)
TSH SERPL DL<=0.05 MIU/L-ACNC: 3.12 UIU/ML (ref 0.27–4.2)
UROBILINOGEN, URINE: 0.2 E.U./DL
VLDLC SERPL CALC-MCNC: 25 MG/DL
WBC # BLD: 8.2 E9/L (ref 4.5–11.5)
WBC UA: ABNORMAL /HPF (ref 0–5)

## 2020-02-27 PROCEDURE — 80053 COMPREHEN METABOLIC PANEL: CPT

## 2020-02-27 PROCEDURE — 36415 COLL VENOUS BLD VENIPUNCTURE: CPT

## 2020-02-27 PROCEDURE — 81001 URINALYSIS AUTO W/SCOPE: CPT

## 2020-02-27 PROCEDURE — 80061 LIPID PANEL: CPT

## 2020-02-27 PROCEDURE — 85025 COMPLETE CBC W/AUTO DIFF WBC: CPT

## 2020-02-27 PROCEDURE — 84443 ASSAY THYROID STIM HORMONE: CPT

## 2020-02-29 LAB — CULTURE, STOOL: NORMAL

## 2020-03-11 ENCOUNTER — TELEPHONE (OUTPATIENT)
Dept: FAMILY MEDICINE CLINIC | Age: 85
End: 2020-03-11

## 2020-03-11 NOTE — TELEPHONE ENCOUNTER
Last Appointment   2/24/2020  Next Appointment  4/29/2020      Patient called in about test results in My chart, would like to talk about the abnormal test. She would like to discuss them before next appt in April. She has concerns. Calos Collazo

## 2020-03-13 ENCOUNTER — HOSPITAL ENCOUNTER (OUTPATIENT)
Age: 85
Discharge: HOME | End: 2020-03-15
Payer: MEDICARE

## 2020-03-13 PROCEDURE — 87077 CULTURE AEROBIC IDENTIFY: CPT

## 2020-03-13 PROCEDURE — 87186 SC STD MICRODIL/AGAR DIL: CPT

## 2020-03-13 PROCEDURE — 87088 URINE BACTERIA CULTURE: CPT

## 2020-03-15 LAB
ORGANISM: ABNORMAL
URINE CULTURE, ROUTINE: ABNORMAL

## 2020-03-17 RX ORDER — NITROFURANTOIN 25; 75 MG/1; MG/1
100 CAPSULE ORAL 2 TIMES DAILY
Qty: 10 CAPSULE | Refills: 0 | Status: SHIPPED | OUTPATIENT
Start: 2020-03-17 | End: 2020-03-22

## 2020-03-19 ENCOUNTER — TELEPHONE (OUTPATIENT)
Dept: FAMILY MEDICINE CLINIC | Age: 85
End: 2020-03-19

## 2020-03-19 NOTE — TELEPHONE ENCOUNTER
Called [employer] regarding message sent stating she has a temp of 100.0 and chills yesterday. Patient states she is feeling fine today. She does not have these symptoms anymore. Advised patient to call the office if she develops a fever again.

## 2020-04-02 LAB
ALBUMIN SERPL-MCNC: 4 G/DL
ALP BLD-CCNC: 99 U/L
ALT SERPL-CCNC: 11 U/L
AST SERPL-CCNC: 16 U/L
BILIRUB SERPL-MCNC: 0.4 MG/DL (ref 0.1–1.4)
BUN BLDV-MCNC: 30 MG/DL
CALCIUM SERPL-MCNC: 10.2 MG/DL
CHLORIDE BLD-SCNC: 195 MMOL/L
CO2: 27 MMOL/L
CREAT SERPL-MCNC: 0.96 MG/DL
GLUCOSE FASTING: 87 MG/DL
POTASSIUM SERPL-SCNC: 4.7 MMOL/L
SODIUM BLD-SCNC: 141 MMOL/L
TOTAL PROTEIN: 6.7 G/DL (ref 6.4–8.2)

## 2020-04-29 ENCOUNTER — OFFICE VISIT (OUTPATIENT)
Dept: FAMILY MEDICINE CLINIC | Age: 85
End: 2020-04-29
Payer: MEDICARE

## 2020-04-29 VITALS
DIASTOLIC BLOOD PRESSURE: 80 MMHG | TEMPERATURE: 98.2 F | WEIGHT: 153 LBS | BODY MASS INDEX: 27.98 KG/M2 | HEART RATE: 60 BPM | SYSTOLIC BLOOD PRESSURE: 128 MMHG

## 2020-04-29 PROCEDURE — G8399 PT W/DXA RESULTS DOCUMENT: HCPCS | Performed by: INTERNAL MEDICINE

## 2020-04-29 PROCEDURE — G8427 DOCREV CUR MEDS BY ELIG CLIN: HCPCS | Performed by: INTERNAL MEDICINE

## 2020-04-29 PROCEDURE — 1123F ACP DISCUSS/DSCN MKR DOCD: CPT | Performed by: INTERNAL MEDICINE

## 2020-04-29 PROCEDURE — 99213 OFFICE O/P EST LOW 20 MIN: CPT | Performed by: INTERNAL MEDICINE

## 2020-04-29 PROCEDURE — 4040F PNEUMOC VAC/ADMIN/RCVD: CPT | Performed by: INTERNAL MEDICINE

## 2020-04-29 PROCEDURE — 1090F PRES/ABSN URINE INCON ASSESS: CPT | Performed by: INTERNAL MEDICINE

## 2020-04-29 PROCEDURE — 1036F TOBACCO NON-USER: CPT | Performed by: INTERNAL MEDICINE

## 2020-04-29 PROCEDURE — G8417 CALC BMI ABV UP PARAM F/U: HCPCS | Performed by: INTERNAL MEDICINE

## 2020-04-29 RX ORDER — IBUPROFEN 800 MG
1 TABLET ORAL DAILY
COMMUNITY

## 2020-04-29 RX ORDER — ASCORBIC ACID 500 MG
500 TABLET ORAL DAILY
COMMUNITY

## 2020-04-29 ASSESSMENT — ENCOUNTER SYMPTOMS
BLOOD IN STOOL: 0
SINUS PAIN: 0
EYE PAIN: 0
SORE THROAT: 0
EYE DISCHARGE: 0
SHORTNESS OF BREATH: 0
ABDOMINAL PAIN: 0
NAUSEA: 0

## 2020-04-29 NOTE — PROGRESS NOTES
Chief Complaint   Patient presents with    Hypertension     Here for recheck on Hypertension and Hypothyroidism. Patient reports feeling well without any complaints. Lab work done,here for review. HPI:  Patient is here for follow-up   Feeling well  Not taking Losartan for 2 weeks    Allergy and Medications are reviewed and updated. Past Medical History, Surgical History, and Family History has been reviewed and updated. Review of Systems:  Review of Systems   Constitutional: Negative for chills and fever. HENT: Negative for congestion, sinus pain and sore throat. Eyes: Negative for pain and discharge. Respiratory: Negative for shortness of breath (No new SOb). Cardiovascular: Negative for chest pain. Gastrointestinal: Negative for abdominal pain, blood in stool and nausea. Genitourinary: Negative for flank pain and frequency. Musculoskeletal: Negative for neck pain. Hematological: Does not bruise/bleed easily. Psychiatric/Behavioral: Negative for suicidal ideas. Vitals:    04/29/20 1049   BP: 128/80   Pulse: 60   Temp: 98.2 °F (36.8 °C)   TempSrc: Oral   Weight: 153 lb (69.4 kg)       Physical Exam  Vitals signs reviewed. Constitutional:       Appearance: She is well-developed. HENT:      Head: Normocephalic and atraumatic. Mouth/Throat:      Pharynx: No oropharyngeal exudate. Eyes:      Conjunctiva/sclera: Conjunctivae normal.      Pupils: Pupils are equal, round, and reactive to light. Neck:      Vascular: No JVD. Cardiovascular:      Rate and Rhythm: Normal rate and regular rhythm. Pulmonary:      Effort: Pulmonary effort is normal.      Breath sounds: Normal breath sounds. No rales. Abdominal:      General: Bowel sounds are normal.      Palpations: Abdomen is soft. Musculoskeletal: Normal range of motion. Lymphadenopathy:      Cervical: No cervical adenopathy. Skin:     General: Skin is warm and dry.    Neurological:      Mental Status: She is alert

## 2020-04-29 NOTE — PATIENT INSTRUCTIONS
Grocery Stores with delivery and  services:  [employer]:   Free  at locations  Delivery is $12.95 a month  Website - [employer]      [employer]:   is $4.95  Delivery is $9.95  Website [employer]    [employer]:   is $4.95  Delivery is $9.95  Website [employer]    [employer]:  Can be ordered for delivery and  with [address]    [employer]:  Free deliver for first 3 orders of $35 or more   Website [employer]. [employer]  Will deliver from [facility], [employer], [employer], and [employer].   Annual membership is $99  Monthly membership is $14  [employer]

## 2020-05-18 RX ORDER — CLONIDINE HYDROCHLORIDE 0.1 MG/1
0.1 TABLET ORAL 2 TIMES DAILY
Qty: 180 TABLET | Refills: 0 | Status: SHIPPED
Start: 2020-05-18 | End: 2020-08-06 | Stop reason: SDUPTHER

## 2020-05-18 RX ORDER — TRIAMTERENE AND HYDROCHLOROTHIAZIDE 37.5; 25 MG/1; MG/1
TABLET ORAL
Qty: 30 TABLET | Refills: 3 | Status: SHIPPED
Start: 2020-05-18 | End: 2020-06-30 | Stop reason: SDUPTHER

## 2020-05-18 RX ORDER — LEVOTHYROXINE SODIUM 0.05 MG/1
50 TABLET ORAL DAILY
Qty: 90 TABLET | Refills: 0 | Status: SHIPPED
Start: 2020-05-18 | End: 2020-08-06 | Stop reason: SDUPTHER

## 2020-06-30 ENCOUNTER — OFFICE VISIT (OUTPATIENT)
Dept: FAMILY MEDICINE CLINIC | Age: 85
End: 2020-06-30
Payer: MEDICARE

## 2020-06-30 VITALS
OXYGEN SATURATION: 97 % | DIASTOLIC BLOOD PRESSURE: 78 MMHG | HEIGHT: 62 IN | SYSTOLIC BLOOD PRESSURE: 144 MMHG | HEART RATE: 55 BPM | RESPIRATION RATE: 20 BRPM | TEMPERATURE: 97.1 F | WEIGHT: 150 LBS | BODY MASS INDEX: 27.6 KG/M2

## 2020-06-30 PROCEDURE — G8417 CALC BMI ABV UP PARAM F/U: HCPCS | Performed by: INTERNAL MEDICINE

## 2020-06-30 PROCEDURE — G8427 DOCREV CUR MEDS BY ELIG CLIN: HCPCS | Performed by: INTERNAL MEDICINE

## 2020-06-30 PROCEDURE — 1090F PRES/ABSN URINE INCON ASSESS: CPT | Performed by: INTERNAL MEDICINE

## 2020-06-30 PROCEDURE — 1123F ACP DISCUSS/DSCN MKR DOCD: CPT | Performed by: INTERNAL MEDICINE

## 2020-06-30 PROCEDURE — 1036F TOBACCO NON-USER: CPT | Performed by: INTERNAL MEDICINE

## 2020-06-30 PROCEDURE — 99214 OFFICE O/P EST MOD 30 MIN: CPT | Performed by: INTERNAL MEDICINE

## 2020-06-30 PROCEDURE — 4040F PNEUMOC VAC/ADMIN/RCVD: CPT | Performed by: INTERNAL MEDICINE

## 2020-06-30 PROCEDURE — G8399 PT W/DXA RESULTS DOCUMENT: HCPCS | Performed by: INTERNAL MEDICINE

## 2020-06-30 RX ORDER — TRIAMTERENE AND HYDROCHLOROTHIAZIDE 37.5; 25 MG/1; MG/1
TABLET ORAL
Qty: 90 TABLET | Refills: 0 | Status: SHIPPED
Start: 2020-06-30 | End: 2020-08-06 | Stop reason: SDUPTHER

## 2020-06-30 ASSESSMENT — ENCOUNTER SYMPTOMS
ABDOMINAL PAIN: 0
SINUS PAIN: 0
EYE PAIN: 0
SHORTNESS OF BREATH: 0
BLOOD IN STOOL: 0
SORE THROAT: 0
NAUSEA: 0
EYE DISCHARGE: 0

## 2020-06-30 NOTE — PATIENT INSTRUCTIONS
Take Losartan 25 mg daily    Monitor BP and Keep a log, Bring it with next visit      Advise to have ( Fasting) lab test prior to next visit.

## 2020-06-30 NOTE — PROGRESS NOTES
Other specified hypothyroidism E03.8 TSH without Reflex   4. Arthritis M19.90 Handicap placard   5. PMR (polymyalgia rheumatica) ([facility]) M35.3        PLAN:      Adv to restart Losartan , but 25 mg daily    Monitor BP and Keep a log, Bring it with next visit      Pt is stable on current medical treatment. Continue current treatment plan    Side effects/Adverse effects/Precautions are reviewed with patient. Low salt, Low Carb diet an low fat diet  Continue medications as advised and take them regularly  Regular exercises as advised    Discussed natural and expected course of this diagnosis and need to alert me if symptoms do not follow expected course, or if any worse. Smoking cessation if applicable, discussed with patient. Advise to have ( Fasting) lab test prior to next visit. CMP LIPID TSH     Handicap Placard Rx - given     Patient Instructions   Take Losartan 25 mg daily    Monitor BP and Keep a log, Bring it with next visit      Advise to have ( Fasting) lab test prior to next visit. Return in about 2 months (around 8/30/2020).

## 2020-07-30 LAB
ALBUMIN SERPL-MCNC: 4.1 G/DL
ALP BLD-CCNC: 85 U/L
ALT SERPL-CCNC: 12 U/L
AST SERPL-CCNC: 17 U/L
BILIRUB SERPL-MCNC: 0.3 MG/DL (ref 0.1–1.4)
BUN BLDV-MCNC: 27 MG/DL
CALCIUM SERPL-MCNC: 10.3 MG/DL
CHLORIDE BLD-SCNC: 103 MMOL/L
CO2: 27 MMOL/L
CREAT SERPL-MCNC: 1.04 MG/DL
GLUCOSE FASTING: 82 MG/DL
POTASSIUM SERPL-SCNC: 4.5 MMOL/L
SODIUM BLD-SCNC: 139 MMOL/L
TOTAL PROTEIN: 6.8 G/DL (ref 6.4–8.2)

## 2020-08-03 ENCOUNTER — HOSPITAL ENCOUNTER (OUTPATIENT)
Age: 85
Discharge: HOME | End: 2020-08-05
Payer: MEDICARE

## 2020-08-03 LAB
ALBUMIN SERPL-MCNC: 3.9 G/DL (ref 3.5–5.2)
ALP BLD-CCNC: 92 U/L (ref 35–104)
ALT SERPL-CCNC: 9 U/L (ref 0–32)
ANION GAP SERPL CALCULATED.3IONS-SCNC: 15 MMOL/L (ref 7–16)
AST SERPL-CCNC: 19 U/L (ref 0–31)
BILIRUB SERPL-MCNC: 0.2 MG/DL (ref 0–1.2)
BUN BLDV-MCNC: 24 MG/DL (ref 8–23)
CALCIUM SERPL-MCNC: 9.7 MG/DL (ref 8.6–10.2)
CHLORIDE BLD-SCNC: 105 MMOL/L (ref 98–107)
CHOLESTEROL, FASTING: 200 MG/DL (ref 0–199)
CO2: 25 MMOL/L (ref 22–29)
CREAT SERPL-MCNC: 1 MG/DL (ref 0.5–1)
GFR AFRICAN AMERICAN: >60
GFR NON-AFRICAN AMERICAN: 53 ML/MIN/1.73
GLUCOSE BLD-MCNC: 73 MG/DL (ref 74–99)
HDLC SERPL-MCNC: 46 MG/DL
LDL CHOLESTEROL CALCULATED: 127 MG/DL (ref 0–99)
POTASSIUM SERPL-SCNC: 4.7 MMOL/L (ref 3.5–5)
SODIUM BLD-SCNC: 145 MMOL/L (ref 132–146)
TOTAL PROTEIN: 6.5 G/DL (ref 6.4–8.3)
TRIGLYCERIDE, FASTING: 134 MG/DL (ref 0–149)
TSH SERPL DL<=0.05 MIU/L-ACNC: 3.55 UIU/ML (ref 0.27–4.2)
VLDLC SERPL CALC-MCNC: 27 MG/DL

## 2020-08-03 PROCEDURE — 80053 COMPREHEN METABOLIC PANEL: CPT

## 2020-08-03 PROCEDURE — 84443 ASSAY THYROID STIM HORMONE: CPT

## 2020-08-03 PROCEDURE — 80061 LIPID PANEL: CPT

## 2020-08-03 PROCEDURE — 36415 COLL VENOUS BLD VENIPUNCTURE: CPT

## 2020-08-06 ENCOUNTER — OFFICE VISIT (OUTPATIENT)
Dept: FAMILY MEDICINE CLINIC | Age: 85
End: 2020-08-06
Payer: MEDICARE

## 2020-08-06 VITALS
HEIGHT: 62 IN | SYSTOLIC BLOOD PRESSURE: 146 MMHG | HEART RATE: 64 BPM | WEIGHT: 150 LBS | TEMPERATURE: 98.4 F | BODY MASS INDEX: 27.6 KG/M2 | OXYGEN SATURATION: 98 % | DIASTOLIC BLOOD PRESSURE: 82 MMHG | RESPIRATION RATE: 20 BRPM

## 2020-08-06 PROCEDURE — 99214 OFFICE O/P EST MOD 30 MIN: CPT | Performed by: INTERNAL MEDICINE

## 2020-08-06 PROCEDURE — 1123F ACP DISCUSS/DSCN MKR DOCD: CPT | Performed by: INTERNAL MEDICINE

## 2020-08-06 PROCEDURE — 1036F TOBACCO NON-USER: CPT | Performed by: INTERNAL MEDICINE

## 2020-08-06 PROCEDURE — G8427 DOCREV CUR MEDS BY ELIG CLIN: HCPCS | Performed by: INTERNAL MEDICINE

## 2020-08-06 PROCEDURE — 1090F PRES/ABSN URINE INCON ASSESS: CPT | Performed by: INTERNAL MEDICINE

## 2020-08-06 PROCEDURE — 4040F PNEUMOC VAC/ADMIN/RCVD: CPT | Performed by: INTERNAL MEDICINE

## 2020-08-06 PROCEDURE — G8399 PT W/DXA RESULTS DOCUMENT: HCPCS | Performed by: INTERNAL MEDICINE

## 2020-08-06 PROCEDURE — G8417 CALC BMI ABV UP PARAM F/U: HCPCS | Performed by: INTERNAL MEDICINE

## 2020-08-06 RX ORDER — TRIAMTERENE AND HYDROCHLOROTHIAZIDE 37.5; 25 MG/1; MG/1
TABLET ORAL
Qty: 90 TABLET | Refills: 0 | Status: SHIPPED
Start: 2020-08-06 | End: 2020-11-30 | Stop reason: SDUPTHER

## 2020-08-06 RX ORDER — CLONIDINE HYDROCHLORIDE 0.1 MG/1
0.1 TABLET ORAL 2 TIMES DAILY
Qty: 180 TABLET | Refills: 0 | Status: SHIPPED
Start: 2020-08-06 | End: 2020-11-30 | Stop reason: SDUPTHER

## 2020-08-06 RX ORDER — LOSARTAN POTASSIUM 25 MG/1
25 TABLET ORAL DAILY
Qty: 90 TABLET | Refills: 0 | Status: SHIPPED
Start: 2020-08-06 | End: 2020-11-30 | Stop reason: SDUPTHER

## 2020-08-06 RX ORDER — LEVOTHYROXINE SODIUM 0.05 MG/1
50 TABLET ORAL DAILY
Qty: 90 TABLET | Refills: 0 | Status: SHIPPED
Start: 2020-08-06 | End: 2020-11-30 | Stop reason: SDUPTHER

## 2020-08-06 ASSESSMENT — ENCOUNTER SYMPTOMS
EYE DISCHARGE: 0
SINUS PAIN: 0
SHORTNESS OF BREATH: 0
NAUSEA: 0
EYE PAIN: 0
BLOOD IN STOOL: 0
SORE THROAT: 0
ABDOMINAL PAIN: 0

## 2020-08-06 NOTE — PROGRESS NOTES
Chief Complaint   Patient presents with    Follow-up    Hypertension     Has her log    Pre-op Exam     Patient having her L knee replaced on 8-, has already seen cardiology     Discuss Labs       HPI:  Patient is here for follow-up     Feel okay  Recently seen by dr Mouna Perez, had stress test, cardiac eval and pt reports that she is cleared by him for Knee replacement surgery       Allergy and Medications are reviewed and updated. Past Medical History, Surgical History, and Family History has been reviewed and updated. Review of Systems:  Review of Systems   Constitutional: Negative for chills and fever. HENT: Negative for congestion, sinus pain and sore throat. Eyes: Negative for pain and discharge. Respiratory: Negative for shortness of breath (No new SOb). Cardiovascular: Negative for chest pain. Gastrointestinal: Negative for abdominal pain, blood in stool and nausea. Genitourinary: Negative for flank pain and frequency. Musculoskeletal: Negative for neck pain. Hematological: Does not bruise/bleed easily. Psychiatric/Behavioral: Negative for suicidal ideas. Vitals:    08/06/20 0951   BP: (!) 146/82   Pulse: 64   Resp: 20   Temp: 98.4 °F (36.9 °C)   TempSrc: Temporal   SpO2: 98%   Weight: 150 lb (68 kg)   Height: 5' 2\" (1.575 m)       Physical Exam  Vitals signs reviewed. Constitutional:       Appearance: She is well-developed. HENT:      Head: Normocephalic and atraumatic. Mouth/Throat:      Pharynx: No oropharyngeal exudate. Eyes:      Conjunctiva/sclera: Conjunctivae normal.      Pupils: Pupils are equal, round, and reactive to light. Neck:      Vascular: No JVD. Cardiovascular:      Rate and Rhythm: Normal rate and regular rhythm. Pulmonary:      Effort: Pulmonary effort is normal.      Breath sounds: Normal breath sounds. No rales. Abdominal:      General: Bowel sounds are normal.      Palpations: Abdomen is soft.    Musculoskeletal: Normal range of motion. Lymphadenopathy:      Cervical: No cervical adenopathy. Skin:     General: Skin is warm and dry. Neurological:      Mental Status: She is alert and oriented to person, place, and time. Psychiatric:         Behavior: Behavior normal.          Labs :    Lab Results   Component Value Date    WBC 8.2 02/27/2020    HGB 13.4 02/27/2020    HCT 43.5 02/27/2020     02/27/2020    CHOL 200 (H) 01/26/2018    TRIG 170 (H) 01/26/2018    HDL 46 08/03/2020    ALT 9 08/03/2020    AST 19 08/03/2020     08/03/2020    K 4.7 08/03/2020     08/03/2020    CREATININE 1.0 08/03/2020    BUN 24 (H) 08/03/2020    CO2 25 08/03/2020    TSH 3.550 08/03/2020    INR 1.0 07/21/2015    GLUF 82 07/30/2020    LABA1C 5.8 07/21/2015     Lab Results   Component Value Date    COLORU Yellow 02/27/2020    NITRU POSITIVE 02/27/2020    GLUCOSEU Negative 02/27/2020    GLUCOSEU NEGATIVE 12/07/2011    KETUA Negative 02/27/2020    UROBILINOGEN 0.2 02/27/2020    BILIRUBINUR Negative 02/27/2020    [address] NEGATIVE 12/07/2011             ASSESSMENT     Patient Active Problem List    Diagnosis Date Noted    C. difficile colitis 02/11/2019    Other specified hypothyroidism 05/31/2018    Essential hypertension 02/21/2018    Other hyperlipidemia 02/21/2018     Overview Note:     Can not tolerate Statin      Myalgia 02/21/2018    PMR (polymyalgia rheumatica) ([facility] [address] 02/21/2018    Arthropathy of cervical facet joint 02/21/2018    Arthralgia of right hip 03/07/2016    Cerebral infarction [facility])      Overview Note:     Replacing Inactive Diagnoses      History of TIA (transient ischemic attack) 07/20/2015    Right cataract 02/04/2014    Left cataract 08/27/2013    Sprain of acromioclavicular ligament 06/04/2012     Overview Note:     replace inactive diagnosis      Shoulder pain 06/04/2012    Trochanteric bursitis 11/01/2011    Hip pain 11/01/2011        Diagnosis:     ICD-10-CM    1. Pre-op evaluation  Z01.818    2. Essential hypertension  I10    3. Other hyperlipidemia  E78.49    4. Other specified hypothyroidism  E03.8    5. PMR (polymyalgia rheumatica) (HCC)  M35.3    6. H/O recurrent transient ischemic attacks  Z86.73 US CAROTID ARTERY BILATERAL       PLAN:      Labs reviewed    Increase Maxzide to 25 mg , whole tab daily     Pt has cardiac clearance from Dr Lila Bahena    Will check [address] carotids    Otherwise patient is medically at average risk for her surgery    Bactroban cream for scratch on Rt LE    Adv she can hold Plavix for 7 days pre-op    Pt is stable on current medical treatment. Continue current treatment plan    Side effects/Adverse effects/Precautions are reviewed with patient. Low salt, Low Carb diet an low fat diet  Continue medications as advised and take them regularly  Regular exercises as advised    Discussed natural and expected course of this diagnosis and need to alert me if symptoms do not follow expected course, or if any worse. Smoking cessation if applicable, discussed with patient. RF of medications are sent to her Mail pharmacy     There are no Patient Instructions on file for this visit. Return in about 2 months (around 10/6/2020).

## 2020-08-12 ENCOUNTER — TELEPHONE (OUTPATIENT)
Dept: FAMILY MEDICINE CLINIC | Age: 85
End: 2020-08-12

## 2020-08-12 NOTE — TELEPHONE ENCOUNTER
Patient said she had black diarrhea. I advised that she will need to go to the Er. She is asking for a stool test.  I informed her that having black stool could indicate blood.   She said she will call Dr Mar Gonzalez

## 2020-08-17 ENCOUNTER — HOSPITAL ENCOUNTER (OUTPATIENT)
Age: 85
Discharge: HOME | End: 2020-08-19

## 2020-08-17 LAB
ABO/RH: NORMAL
ANION GAP SERPL CALCULATED.3IONS-SCNC: 10 MMOL/L (ref 7–16)
ANTIBODY SCREEN: NORMAL
APTT: 27.7 SEC (ref 24.5–35.1)
BASOPHILS ABSOLUTE: 0.01 E9/L (ref 0–0.2)
BASOPHILS RELATIVE PERCENT: 0.1 % (ref 0–2)
BUN BLDV-MCNC: 25 MG/DL (ref 8–23)
CALCIUM SERPL-MCNC: 9.7 MG/DL (ref 8.6–10.2)
CHLORIDE BLD-SCNC: 104 MMOL/L (ref 98–107)
CO2: 26 MMOL/L (ref 22–29)
CREAT SERPL-MCNC: 0.9 MG/DL (ref 0.5–1)
EOSINOPHILS ABSOLUTE: 0.09 E9/L (ref 0.05–0.5)
EOSINOPHILS RELATIVE PERCENT: 0.9 % (ref 0–6)
GFR AFRICAN AMERICAN: >60
GFR NON-AFRICAN AMERICAN: 60 ML/MIN/1.73
GLUCOSE BLD-MCNC: 93 MG/DL (ref 74–99)
HCT VFR BLD CALC: 43.6 % (ref 34–48)
HEMOGLOBIN: 14 G/DL (ref 11.5–15.5)
IMMATURE GRANULOCYTES #: 0.03 E9/L
IMMATURE GRANULOCYTES %: 0.3 % (ref 0–5)
INR BLD: 0.9
LYMPHOCYTES ABSOLUTE: 1.39 E9/L (ref 1.5–4)
LYMPHOCYTES RELATIVE PERCENT: 13.7 % (ref 20–42)
MCH RBC QN AUTO: 30.4 PG (ref 26–35)
MCHC RBC AUTO-ENTMCNC: 32.1 % (ref 32–34.5)
MCV RBC AUTO: 94.8 FL (ref 80–99.9)
MONOCYTES ABSOLUTE: 0.46 E9/L (ref 0.1–0.95)
MONOCYTES RELATIVE PERCENT: 4.5 % (ref 2–12)
NEUTROPHILS ABSOLUTE: 8.16 E9/L (ref 1.8–7.3)
NEUTROPHILS RELATIVE PERCENT: 80.5 % (ref 43–80)
PDW BLD-RTO: 13.3 FL (ref 11.5–15)
PLATELET # BLD: 254 E9/L (ref 130–450)
PMV BLD AUTO: 10 FL (ref 7–12)
POTASSIUM SERPL-SCNC: 4.4 MMOL/L (ref 3.5–5)
PROTHROMBIN TIME: 10.7 SEC (ref 9.3–12.4)
RBC # BLD: 4.6 E12/L (ref 3.5–5.5)
SODIUM BLD-SCNC: 140 MMOL/L (ref 132–146)
WBC # BLD: 10.1 E9/L (ref 4.5–11.5)

## 2020-08-17 PROCEDURE — 86900 BLOOD TYPING SEROLOGIC ABO: CPT

## 2020-08-17 PROCEDURE — 85025 COMPLETE CBC W/AUTO DIFF WBC: CPT

## 2020-08-17 PROCEDURE — 85730 THROMBOPLASTIN TIME PARTIAL: CPT

## 2020-08-17 PROCEDURE — 87081 CULTURE SCREEN ONLY: CPT

## 2020-08-17 PROCEDURE — 87449 NOS EACH ORGANISM AG IA: CPT

## 2020-08-17 PROCEDURE — 85610 PROTHROMBIN TIME: CPT

## 2020-08-17 PROCEDURE — 86850 RBC ANTIBODY SCREEN: CPT

## 2020-08-17 PROCEDURE — 87324 CLOSTRIDIUM AG IA: CPT

## 2020-08-17 PROCEDURE — 87045 FECES CULTURE AEROBIC BACT: CPT

## 2020-08-17 PROCEDURE — 86901 BLOOD TYPING SEROLOGIC RH(D): CPT

## 2020-08-17 PROCEDURE — 80048 BASIC METABOLIC PNL TOTAL CA: CPT

## 2020-08-18 LAB — C DIFF TOXIN/ANTIGEN: NORMAL

## 2020-08-19 LAB
CULTURE, STOOL: NORMAL
MRSA CULTURE ONLY: NORMAL

## 2020-08-24 ENCOUNTER — HOSPITAL ENCOUNTER (OUTPATIENT)
Age: 85
Discharge: HOME | End: 2020-08-26

## 2020-08-24 PROCEDURE — 88305 TISSUE EXAM BY PATHOLOGIST: CPT

## 2020-08-24 PROCEDURE — 88311 DECALCIFY TISSUE: CPT

## 2020-08-25 ENCOUNTER — HOSPITAL ENCOUNTER (OUTPATIENT)
Age: 85
Discharge: HOME | End: 2020-08-27

## 2020-08-25 LAB
ANION GAP SERPL CALCULATED.3IONS-SCNC: 6 MMOL/L (ref 7–16)
BUN BLDV-MCNC: 13 MG/DL (ref 8–23)
CALCIUM SERPL-MCNC: 9.1 MG/DL (ref 8.6–10.2)
CHLORIDE BLD-SCNC: 105 MMOL/L (ref 98–107)
CO2: 25 MMOL/L (ref 22–29)
CREAT SERPL-MCNC: 0.9 MG/DL (ref 0.5–1)
GFR AFRICAN AMERICAN: >60
GFR NON-AFRICAN AMERICAN: 60 ML/MIN/1.73
GLUCOSE BLD-MCNC: 110 MG/DL (ref 74–99)
HCT VFR BLD CALC: 38.2 % (ref 34–48)
HEMOGLOBIN: 12 G/DL (ref 11.5–15.5)
MCH RBC QN AUTO: 30.2 PG (ref 26–35)
MCHC RBC AUTO-ENTMCNC: 31.4 % (ref 32–34.5)
MCV RBC AUTO: 96 FL (ref 80–99.9)
PDW BLD-RTO: 13.3 FL (ref 11.5–15)
PLATELET # BLD: 198 E9/L (ref 130–450)
PMV BLD AUTO: 10.2 FL (ref 7–12)
POTASSIUM SERPL-SCNC: 4.2 MMOL/L (ref 3.5–5)
RBC # BLD: 3.98 E12/L (ref 3.5–5.5)
SODIUM BLD-SCNC: 136 MMOL/L (ref 132–146)
WBC # BLD: 8.9 E9/L (ref 4.5–11.5)

## 2020-08-25 PROCEDURE — 80048 BASIC METABOLIC PNL TOTAL CA: CPT

## 2020-08-25 PROCEDURE — 85027 COMPLETE CBC AUTOMATED: CPT

## 2020-08-26 ENCOUNTER — HOSPITAL ENCOUNTER (OUTPATIENT)
Age: 85
Discharge: HOME | End: 2020-08-28

## 2020-08-26 LAB
ANION GAP SERPL CALCULATED.3IONS-SCNC: 11 MMOL/L (ref 7–16)
BUN BLDV-MCNC: 16 MG/DL (ref 8–23)
CALCIUM SERPL-MCNC: 9.7 MG/DL (ref 8.6–10.2)
CHLORIDE BLD-SCNC: 102 MMOL/L (ref 98–107)
CO2: 24 MMOL/L (ref 22–29)
CREAT SERPL-MCNC: 1 MG/DL (ref 0.5–1)
GFR AFRICAN AMERICAN: >60
GFR NON-AFRICAN AMERICAN: 53 ML/MIN/1.73
GLUCOSE BLD-MCNC: 87 MG/DL (ref 74–99)
HCT VFR BLD CALC: 39.6 % (ref 34–48)
HEMOGLOBIN: 12.7 G/DL (ref 11.5–15.5)
MCH RBC QN AUTO: 30.6 PG (ref 26–35)
MCHC RBC AUTO-ENTMCNC: 32.1 % (ref 32–34.5)
MCV RBC AUTO: 95.4 FL (ref 80–99.9)
PDW BLD-RTO: 13.5 FL (ref 11.5–15)
PLATELET # BLD: 197 E9/L (ref 130–450)
PMV BLD AUTO: 10.3 FL (ref 7–12)
POTASSIUM SERPL-SCNC: 4.6 MMOL/L (ref 3.5–5)
RBC # BLD: 4.15 E12/L (ref 3.5–5.5)
SODIUM BLD-SCNC: 137 MMOL/L (ref 132–146)
WBC # BLD: 11.5 E9/L (ref 4.5–11.5)

## 2020-08-26 PROCEDURE — 80048 BASIC METABOLIC PNL TOTAL CA: CPT

## 2020-08-26 PROCEDURE — 85027 COMPLETE CBC AUTOMATED: CPT

## 2020-09-03 ENCOUNTER — TELEPHONE (OUTPATIENT)
Dept: FAMILY MEDICINE CLINIC | Age: 85
End: 2020-09-03

## 2020-09-03 NOTE — TELEPHONE ENCOUNTER
Patient was seen in the office today and had a chief complaint of shortness of breath.     Patient did not appear to be in distress    Patient expressed she thought it was from the percocet    Wanted you advised as her next appt is not until 10-

## 2020-10-20 ENCOUNTER — OFFICE VISIT (OUTPATIENT)
Dept: FAMILY MEDICINE CLINIC | Age: 85
End: 2020-10-20
Payer: MEDICARE

## 2020-10-20 VITALS
HEART RATE: 63 BPM | RESPIRATION RATE: 16 BRPM | DIASTOLIC BLOOD PRESSURE: 64 MMHG | WEIGHT: 146 LBS | BODY MASS INDEX: 26.87 KG/M2 | HEIGHT: 62 IN | TEMPERATURE: 97.6 F | SYSTOLIC BLOOD PRESSURE: 144 MMHG | OXYGEN SATURATION: 96 %

## 2020-10-20 PROCEDURE — G8484 FLU IMMUNIZE NO ADMIN: HCPCS | Performed by: INTERNAL MEDICINE

## 2020-10-20 PROCEDURE — 1123F ACP DISCUSS/DSCN MKR DOCD: CPT | Performed by: INTERNAL MEDICINE

## 2020-10-20 PROCEDURE — 4040F PNEUMOC VAC/ADMIN/RCVD: CPT | Performed by: INTERNAL MEDICINE

## 2020-10-20 PROCEDURE — G0439 PPPS, SUBSEQ VISIT: HCPCS | Performed by: INTERNAL MEDICINE

## 2020-10-20 ASSESSMENT — LIFESTYLE VARIABLES
AUDIT TOTAL SCORE: INCOMPLETE
AUDIT-C TOTAL SCORE: INCOMPLETE
HOW OFTEN DO YOU HAVE A DRINK CONTAINING ALCOHOL: 0
HOW OFTEN DO YOU HAVE A DRINK CONTAINING ALCOHOL: NEVER

## 2020-10-20 ASSESSMENT — PATIENT HEALTH QUESTIONNAIRE - PHQ9
2. FEELING DOWN, DEPRESSED OR HOPELESS: 0
SUM OF ALL RESPONSES TO PHQ9 QUESTIONS 1 & 2: 0
SUM OF ALL RESPONSES TO PHQ QUESTIONS 1-9: 0
1. LITTLE INTEREST OR PLEASURE IN DOING THINGS: 0
SUM OF ALL RESPONSES TO PHQ QUESTIONS 1-9: 0
SUM OF ALL RESPONSES TO PHQ QUESTIONS 1-9: 0

## 2020-10-20 NOTE — PROGRESS NOTES
Medicare Annual Wellness Visit  Name: Destiny Marie Date: 10/20/2020   MRN: [id number] Sex: Female   Age: 82 y.o. Ethnicity: Non-/Non    : 1935 Race: Maryclare Claude is here for Medicare AWV and Hypertension (BP log )    Screenings for behavioral, psychosocial and functional/safety risks, and cognitive dysfunction are all negative except as indicated below. These results, as well as other patient data from the [address] form, are documented in Flowsheets linked to this Encounter. Allergies   Allergen Reactions    Ciprofloxacin Hcl      Made me sick     Cleocin [Clindamycin Hcl] Hives     BRIGHT RED RASH    Lisinopril Other (See Comments)     Fever chills    Pcn [Penicillins]     Statins Other (See Comments)     Extreme muscle fatigue         Prior to Visit Medications    Medication Sig Taking?  Authorizing Provider   levothyroxine (SYNTHROID) 50 MCG tablet Take 1 tablet by mouth Daily Yes Rafa Cavazos MD   cloNIDine (CATAPRES) 0.1 MG tablet Take 1 tablet by mouth 2 times daily Yes Rfaa Cavazos MD   triamterene-hydroCHLOROthiazide (MAXZIDE-25) 37.5-25 MG per tablet take 1/2 tablet by mouth once a day  Patient taking differently: Take 1 tablet by mouth daily  Yes Rafa Cavazos MD   losartan (COZAAR) 25 MG tablet Take 1 tablet by mouth daily Yes Rafa Cavazos MD   Cholecalciferol (VITAMIN D3) 10 MCG (400 UNIT) CAPS Take 1 capsule by mouth daily Yes Historical Provider, MD   vitamin C (ASCORBIC ACID) 500 MG tablet Take 500 mg by mouth daily Yes Historical Provider, MD   clopidogrel (PLAVIX) 75 MG tablet Take 1 tablet by mouth daily Yes Rafa Cavazos MD   Naproxen Sodium 220 MG CAPS Take by mouth daily as needed  Yes Historical Provider, MD   coenzyme Q10 100 MG CAPS capsule Take 100 mg by mouth daily  Yes Historical Provider, MD   B Complex Vitamins (VITAMIN B COMPLEX PO)  Yes Historical Provider, MD   CALCIUM PO Take by mouth daily Yes Historical Provider, MD   MAGNESIUM PO Take by mouth 2 times daily Yes Historical Provider, MD   predniSONE 2 MG TBEC Take 4 mg by mouth daily  Yes Historical Provider, MD   Biotin 18996 MCG TBDP Take by mouth Yes Historical Provider, MD   Methylsulfonylmethane (MSM) 1000 MG TABS Take 1 tablet by mouth daily  Yes Historical Provider, MD   FOLIC ACID PO Take 1 tablet by mouth daily  Yes Historical Provider, MD         Past Medical History:   Diagnosis Date    Arthritis     Broken ankle     Broken finger     Chronic kidney disease     prolapsed bladder, pt denies any kidney disease    Hyperlipidemia     Hypertension     Hyperthyroidism     Prolonged emergence from general anesthesia     Sinus pain     Stroke ([facility] [address] 2015       Past Surgical History:   Procedure Laterality Date    APPENDECTOMY      CATARACT REMOVAL      left eye 8-    CATARACT REMOVAL WITH IMPLANT Right 2 4 2014    CHOLECYSTECTOMY      HYSTERECTOMY      KNEE ARTHROSCOPY Left 07/27/2017    TONSILLECTOMY      TOTAL KNEE ARTHROPLASTY Left 08/24/2020    Dr Joann Rodriguez       No family history on file. CareTeam (Including outside providers/suppliers regularly involved in providing care):   Patient Care Team:  Marisol Joseph MD as PCP - Benito Sicard, MD as PCP - [facility] Empaneled Provider    Wt Readings from Last 3 Encounters:   10/20/20 146 lb (66.2 kg)   08/06/20 150 lb (68 kg)   06/30/20 150 lb (68 kg)     Vitals:    10/20/20 1015   BP: (!) 144/64   Pulse: 63   Resp: 16   Temp: 97.6 °F (36.4 °C)   TempSrc: Temporal   SpO2: 96%   Weight: 146 lb (66.2 kg)   Height: 5' 2\" (1.575 m)     Body mass index is 26.7 kg/m². Based upon direct observation of the patient, evaluation of cognition reveals recent and remote memory intact.     General Appearance: alert and oriented to person, place and time, well developed and well- nourished, in no acute distress  Skin: warm and dry, no rash or erythema  Head: normocephalic and atraumatic  Eyes: pupils equal, round, and reactive to light, extraocular eye movements intact, conjunctivae normal  ENT: tympanic membrane, external ear and ear canal normal bilaterally, nose without deformity, nasal mucosa and turbinates normal without polyps  Neck: supple and non-tender without mass, no thyromegaly or thyroid nodules, no cervical lymphadenopathy  Pulmonary/Chest: clear to auscultation bilaterally- no wheezes, rales or rhonchi, normal air movement, no respiratory distress  Cardiovascular: normal rate, regular rhythm, normal S1 and S2, no murmurs, rubs, clicks, or gallops, distal pulses intact, no carotid bruits  Abdomen: soft, non-tender, non-distended, normal bowel sounds, no masses or organomegaly  Extremities: no cyanosis, clubbing or edema  Musculoskeletal: normal range of motion, no joint swelling, deformity or tenderness  Neurologic: reflexes normal and symmetric, no cranial nerve deficit, gait, coordination and speech normal    Patient's complete Health Risk Assessment and screening values have been reviewed and are found in Flowsheets. The following problems were reviewed today and where indicated follow up appointments were made and/or referrals ordered. Positive Risk Factor Screenings with Interventions:     No Positive Risk Factors identified today.     Personalized Preventive Plan   Current Health Maintenance Status  Immunization History   Administered Date(s) Administered    Influenza Vaccine, unspecified formulation 10/23/2012, 11/01/2013, 10/20/2014, 09/09/2015, 09/28/2016, 12/02/2017    Influenza Virus Vaccine 10/23/2012, 11/01/2013, 10/20/2014, 09/09/2015, 09/28/2016, 12/02/2017, 12/06/2017    Influenza, High Dose (Fluzone 65 yrs and older) 10/11/2018    Influenza, Quadv, adjuvanted, 65 yrs +, IM, PF (Fluad) 10/02/2020    Influenza, Triv, inactivated, subunit, adjuvanted, IM (Fluad 65 yrs and older) 12/06/2017, 12/23/2019    Pneumococcal Conjugate 13-valent ([id number]) 09/28/2016    Pneumococcal Polysaccharide ([id number]) 02/16/2012    Tdap (Boostrix, Adacel) 04/24/2017        Health Maintenance   Topic Date Due    Shingles Vaccine (1 of 2) 02/14/1987    Annual Wellness Visit (AWV)  05/29/2019    TSH testing  08/03/2021    Potassium monitoring  08/26/2021    Creatinine monitoring  08/26/2021    DTaP/Tdap/Td vaccine (2 - Td) 04/24/2027    DEXA (modify frequency per FRAX score)  Completed    Flu vaccine  Completed    Pneumococcal 65+ years Vaccine  Completed    Hepatitis A vaccine  Aged Out    Hepatitis B vaccine  Aged Out    Hib vaccine  Aged Out    Meningococcal (ACWY) vaccine  Aged Out     Recommendations for [employer] Due: see orders and patient instructions/AVS.  . Recommended screening schedule for the next 5-10 years is provided to the patient in written form: see Patient Instructions/AVS.    Emerald Antonio was seen today for medicare awv and hypertension. Diagnoses and all orders for this visit:    Essential hypertension  -     CBC Auto Differential; Future  -     Comprehensive Metabolic Panel, Fasting; Future  -     Urinalysis with Microscopic; Future    Other hyperlipidemia  -     Lipid, Fasting; Future    Other specified hypothyroidism  -     TSH without Reflex;  Future          Had Left TKR on 8/24/2020 - Dr Dion Schuster   Adv to take HTN medications regularly

## 2020-11-12 ENCOUNTER — TELEPHONE (OUTPATIENT)
Dept: FAMILY MEDICINE CLINIC | Age: 85
End: 2020-11-12

## 2020-11-12 NOTE — TELEPHONE ENCOUNTER
Patient called requesting order to check her stool.   Patient c/o diarrhea x wks, since being on antibiotic for knee SX.

## 2020-11-13 NOTE — TELEPHONE ENCOUNTER
Patient informed, left vm msg advising order is in and she can stop by Lab before 5:00 pm today for stool specimen cup.

## 2020-11-16 DIAGNOSIS — R19.7 DIARRHEA, UNSPECIFIED TYPE: ICD-10-CM

## 2020-11-17 LAB — C DIFF TOXIN/ANTIGEN: NORMAL

## 2020-12-01 RX ORDER — LOSARTAN POTASSIUM 25 MG/1
25 TABLET ORAL DAILY
Qty: 90 TABLET | Refills: 0 | Status: SHIPPED
Start: 2020-12-01 | End: 2021-03-11 | Stop reason: SDUPTHER

## 2020-12-01 RX ORDER — CLONIDINE HYDROCHLORIDE 0.1 MG/1
0.1 TABLET ORAL 2 TIMES DAILY
Qty: 180 TABLET | Refills: 0 | Status: SHIPPED
Start: 2020-12-01 | End: 2020-12-17 | Stop reason: SDUPTHER

## 2020-12-01 RX ORDER — CLOPIDOGREL BISULFATE 75 MG/1
75 TABLET ORAL DAILY
Qty: 90 TABLET | Refills: 0 | Status: SHIPPED
Start: 2020-12-01 | End: 2021-03-11 | Stop reason: SDUPTHER

## 2020-12-01 RX ORDER — LEVOTHYROXINE SODIUM 0.05 MG/1
50 TABLET ORAL DAILY
Qty: 90 TABLET | Refills: 0 | Status: SHIPPED
Start: 2020-12-01 | End: 2021-03-11 | Stop reason: SDUPTHER

## 2020-12-01 RX ORDER — TRIAMTERENE AND HYDROCHLOROTHIAZIDE 37.5; 25 MG/1; MG/1
TABLET ORAL
Qty: 90 TABLET | Refills: 0 | Status: SHIPPED
Start: 2020-12-01 | End: 2021-03-11 | Stop reason: SDUPTHER

## 2020-12-17 RX ORDER — CLONIDINE HYDROCHLORIDE 0.1 MG/1
0.1 TABLET ORAL 2 TIMES DAILY
Qty: 30 TABLET | Refills: 0 | Status: SHIPPED
Start: 2020-12-17 | End: 2021-03-11 | Stop reason: SDUPTHER

## 2020-12-17 NOTE — TELEPHONE ENCOUNTER
Patient called requesting a short term refill, stating her mail order was shipped, but hasn't been rec'd and she is out.      Last seen 10/20/2020  Next appt 1/4/2021  [facility]/SERGIO Barker

## 2020-12-29 DIAGNOSIS — E78.49 OTHER HYPERLIPIDEMIA: ICD-10-CM

## 2020-12-29 DIAGNOSIS — E03.8 OTHER SPECIFIED HYPOTHYROIDISM: ICD-10-CM

## 2020-12-29 DIAGNOSIS — I10 ESSENTIAL HYPERTENSION: ICD-10-CM

## 2020-12-29 LAB
ALBUMIN SERPL-MCNC: 4 G/DL (ref 3.5–5.2)
ALP BLD-CCNC: 103 U/L (ref 35–104)
ALT SERPL-CCNC: 15 U/L (ref 0–32)
ANION GAP SERPL CALCULATED.3IONS-SCNC: 12 MMOL/L (ref 7–16)
AST SERPL-CCNC: 20 U/L (ref 0–31)
BACTERIA: ABNORMAL /HPF
BASOPHILS ABSOLUTE: 0.01 E9/L (ref 0–0.2)
BASOPHILS RELATIVE PERCENT: 0.1 % (ref 0–2)
BILIRUB SERPL-MCNC: 0.2 MG/DL (ref 0–1.2)
BILIRUBIN URINE: NEGATIVE
BLOOD, URINE: NEGATIVE
BUN BLDV-MCNC: 26 MG/DL (ref 8–23)
CALCIUM SERPL-MCNC: 9.7 MG/DL (ref 8.6–10.2)
CHLORIDE BLD-SCNC: 105 MMOL/L (ref 98–107)
CHOLESTEROL, FASTING: 189 MG/DL (ref 0–199)
CLARITY: CLEAR
CO2: 26 MMOL/L (ref 22–29)
COLOR: YELLOW
CREAT SERPL-MCNC: 1 MG/DL (ref 0.5–1)
EOSINOPHILS ABSOLUTE: 0.18 E9/L (ref 0.05–0.5)
EOSINOPHILS RELATIVE PERCENT: 2.6 % (ref 0–6)
EPITHELIAL CELLS, UA: ABNORMAL /HPF
GFR AFRICAN AMERICAN: >60
GFR NON-AFRICAN AMERICAN: 53 ML/MIN/1.73
GLUCOSE FASTING: 81 MG/DL (ref 74–99)
GLUCOSE URINE: NEGATIVE MG/DL
HCT VFR BLD CALC: 45.8 % (ref 34–48)
HDLC SERPL-MCNC: 48 MG/DL
HEMOGLOBIN: 14.2 G/DL (ref 11.5–15.5)
IMMATURE GRANULOCYTES #: 0.02 E9/L
IMMATURE GRANULOCYTES %: 0.3 % (ref 0–5)
KETONES, URINE: NEGATIVE MG/DL
LDL CHOLESTEROL CALCULATED: 109 MG/DL (ref 0–99)
LEUKOCYTE ESTERASE, URINE: ABNORMAL
LYMPHOCYTES ABSOLUTE: 2.27 E9/L (ref 1.5–4)
LYMPHOCYTES RELATIVE PERCENT: 32.6 % (ref 20–42)
MCH RBC QN AUTO: 29.6 PG (ref 26–35)
MCHC RBC AUTO-ENTMCNC: 31 % (ref 32–34.5)
MCV RBC AUTO: 95.6 FL (ref 80–99.9)
MONOCYTES ABSOLUTE: 0.71 E9/L (ref 0.1–0.95)
MONOCYTES RELATIVE PERCENT: 10.2 % (ref 2–12)
NEUTROPHILS ABSOLUTE: 3.78 E9/L (ref 1.8–7.3)
NEUTROPHILS RELATIVE PERCENT: 54.2 % (ref 43–80)
NITRITE, URINE: NEGATIVE
PDW BLD-RTO: 13.8 FL (ref 11.5–15)
PH UA: 6 (ref 5–9)
PLATELET # BLD: 239 E9/L (ref 130–450)
PMV BLD AUTO: 10.2 FL (ref 7–12)
POTASSIUM SERPL-SCNC: 4.3 MMOL/L (ref 3.5–5)
PROTEIN UA: NEGATIVE MG/DL
RBC # BLD: 4.79 E12/L (ref 3.5–5.5)
RBC UA: ABNORMAL /HPF (ref 0–2)
SODIUM BLD-SCNC: 143 MMOL/L (ref 132–146)
SPECIFIC GRAVITY UA: 1.02 (ref 1–1.03)
TOTAL PROTEIN: 7.2 G/DL (ref 6.4–8.3)
TRIGLYCERIDE, FASTING: 159 MG/DL (ref 0–149)
TSH SERPL DL<=0.05 MIU/L-ACNC: 2.86 UIU/ML (ref 0.27–4.2)
UROBILINOGEN, URINE: 0.2 E.U./DL
VLDLC SERPL CALC-MCNC: 32 MG/DL
WBC # BLD: 7 E9/L (ref 4.5–11.5)
WBC UA: ABNORMAL /HPF (ref 0–5)

## 2021-01-04 ENCOUNTER — OFFICE VISIT (OUTPATIENT)
Dept: FAMILY MEDICINE CLINIC | Age: 86
End: 2021-01-04
Payer: MEDICARE

## 2021-01-04 VITALS
HEART RATE: 71 BPM | HEIGHT: 62 IN | BODY MASS INDEX: 26.72 KG/M2 | TEMPERATURE: 97.3 F | OXYGEN SATURATION: 95 % | SYSTOLIC BLOOD PRESSURE: 138 MMHG | RESPIRATION RATE: 16 BRPM | DIASTOLIC BLOOD PRESSURE: 78 MMHG | WEIGHT: 145.2 LBS

## 2021-01-04 DIAGNOSIS — M35.3 PMR (POLYMYALGIA RHEUMATICA) (HCC): ICD-10-CM

## 2021-01-04 DIAGNOSIS — Z12.31 BREAST CANCER SCREENING BY MAMMOGRAM: ICD-10-CM

## 2021-01-04 DIAGNOSIS — E78.49 OTHER HYPERLIPIDEMIA: ICD-10-CM

## 2021-01-04 DIAGNOSIS — E03.8 OTHER SPECIFIED HYPOTHYROIDISM: ICD-10-CM

## 2021-01-04 DIAGNOSIS — I10 ESSENTIAL HYPERTENSION: Primary | ICD-10-CM

## 2021-01-04 PROCEDURE — 99213 OFFICE O/P EST LOW 20 MIN: CPT | Performed by: INTERNAL MEDICINE

## 2021-01-04 PROCEDURE — G8484 FLU IMMUNIZE NO ADMIN: HCPCS | Performed by: INTERNAL MEDICINE

## 2021-01-04 PROCEDURE — G8399 PT W/DXA RESULTS DOCUMENT: HCPCS | Performed by: INTERNAL MEDICINE

## 2021-01-04 PROCEDURE — 4040F PNEUMOC VAC/ADMIN/RCVD: CPT | Performed by: INTERNAL MEDICINE

## 2021-01-04 PROCEDURE — 1090F PRES/ABSN URINE INCON ASSESS: CPT | Performed by: INTERNAL MEDICINE

## 2021-01-04 PROCEDURE — G8427 DOCREV CUR MEDS BY ELIG CLIN: HCPCS | Performed by: INTERNAL MEDICINE

## 2021-01-04 PROCEDURE — 1036F TOBACCO NON-USER: CPT | Performed by: INTERNAL MEDICINE

## 2021-01-04 PROCEDURE — 1123F ACP DISCUSS/DSCN MKR DOCD: CPT | Performed by: INTERNAL MEDICINE

## 2021-01-04 PROCEDURE — G8417 CALC BMI ABV UP PARAM F/U: HCPCS | Performed by: INTERNAL MEDICINE

## 2021-01-04 ASSESSMENT — ENCOUNTER SYMPTOMS
SHORTNESS OF BREATH: 0
NAUSEA: 0
ABDOMINAL PAIN: 0
SORE THROAT: 0
SINUS PAIN: 0
EYE PAIN: 0
BLOOD IN STOOL: 0
EYE DISCHARGE: 0

## 2021-01-04 ASSESSMENT — PATIENT HEALTH QUESTIONNAIRE - PHQ9
SUM OF ALL RESPONSES TO PHQ QUESTIONS 1-9: 0

## 2021-01-04 NOTE — PROGRESS NOTES
Chief Complaint   Patient presents with    Follow-up     Needs a Handicap Placcard and Mammogram        HPI:  Patient is here for follow-up     Feel okay    Need Handicap placard      Allergy and Medications are reviewed and updated. Past Medical History, Surgical History, and Family History has been reviewed and updated. Review of Systems:  Review of Systems   Constitutional: Negative for chills and fever. HENT: Negative for congestion, sinus pain and sore throat. Eyes: Negative for pain and discharge. Respiratory: Negative for shortness of breath (No new SOb). Cardiovascular: Negative for chest pain. Gastrointestinal: Negative for abdominal pain, blood in stool and nausea. Genitourinary: Negative for flank pain and frequency. Musculoskeletal: Positive for arthralgias. Negative for neck pain. Hematological: Does not bruise/bleed easily. Psychiatric/Behavioral: Negative for suicidal ideas. Vitals:    01/04/21 1236 01/04/21 1240   BP: (!) 148/70 138/78   Pulse: 71    Resp: 16    Temp: 97.3 °F (36.3 °C)    TempSrc: Temporal    SpO2: 95%    Weight: 145 lb 3.2 oz (65.9 kg)    Height: 5' 2\" (1.575 m)        Physical Exam  Vitals signs reviewed. Constitutional:       Appearance: She is well-developed. HENT:      Head: Normocephalic and atraumatic. Mouth/Throat:      Pharynx: No oropharyngeal exudate. Eyes:      Conjunctiva/sclera: Conjunctivae normal.      Pupils: Pupils are equal, round, and reactive to light. Neck:      Vascular: No JVD. Cardiovascular:      Rate and Rhythm: Normal rate and regular rhythm. Pulmonary:      Effort: Pulmonary effort is normal.      Breath sounds: Normal breath sounds. No rales. Abdominal:      General: Bowel sounds are normal.      Palpations: Abdomen is soft. Musculoskeletal: Normal range of motion. Lymphadenopathy:      Cervical: No cervical adenopathy. Skin:     General: Skin is warm and dry.    Neurological:      Mental Status: She is alert and oriented to person, place, and time. Psychiatric:         Behavior: Behavior normal.          Labs :    Lab Results   Component Value Date    WBC 7.0 12/29/2020    HGB 14.2 12/29/2020    HCT 45.8 12/29/2020     12/29/2020    CHOL 200 (H) 01/26/2018    TRIG 170 (H) 01/26/2018    HDL 48 12/29/2020    ALT 15 12/29/2020    AST 20 12/29/2020     12/29/2020    K 4.3 12/29/2020     12/29/2020    CREATININE 1.0 12/29/2020    BUN 26 (H) 12/29/2020    CO2 26 12/29/2020    TSH 2.860 12/29/2020    INR 0.9 08/17/2020    GLUF 81 12/29/2020    LABA1C 5.8 07/21/2015     Lab Results   Component Value Date    COLORU Yellow 12/29/2020    NITRU Negative 12/29/2020    GLUCOSEU Negative 12/29/2020    GLUCOSEU NEGATIVE 12/07/2011    KETUA Negative 12/29/2020    UROBILINOGEN 0.2 12/29/2020    BILIRUBINUR Negative 12/29/2020    [address] NEGATIVE 12/07/2011             ASSESSMENT     Patient Active Problem List    Diagnosis Date Noted    C. difficile colitis 02/11/2019    Other specified hypothyroidism 05/31/2018    Essential hypertension 02/21/2018    Other hyperlipidemia 02/21/2018     Overview Note:     Can not tolerate Statin      Myalgia 02/21/2018    PMR (polymyalgia rheumatica) ([facility] [address] 02/21/2018    Arthropathy of cervical facet joint 02/21/2018    Arthralgia of right hip 03/07/2016    Cerebral infarction [facility])      Overview Note:     Replacing Inactive Diagnoses      History of TIA (transient ischemic attack) 07/20/2015    Right cataract 02/04/2014    Left cataract 08/27/2013    Sprain of acromioclavicular ligament 06/04/2012     Overview Note:     replace inactive diagnosis      Shoulder pain 06/04/2012    Trochanteric bursitis 11/01/2011    Hip pain 11/01/2011        Diagnosis:     ICD-10-CM    1. Essential hypertension  I10    2. Other hyperlipidemia  E78.49    3. Other specified hypothyroidism  E03.8    4. PMR (polymyalgia rheumatica) ([facility])  M35.3    5.  Breast cancer screening by mammogram  Z12.31 PIETER DIGITAL SCREEN W OR WO CAD BILATERAL       PLAN:        Labs reviewed    Pt is stable on current medical treatment. Continue current treatment plan    Side effects/Adverse effects/Precautions are reviewed with patient. Low salt, Low Carb diet an low fat diet  Continue medications as advised and take them regularly  Regular exercises as advised    Discussed natural and expected course of this diagnosis and need to alert me if symptoms do not follow expected course, or if any worse. Smoking cessation if applicable, discussed with patient. Handicap Placard x 3 yrs     Mammo - screening     There are no Patient Instructions on file for this visit. Return in about 2 months (around 3/4/2021).

## 2021-01-04 NOTE — LETTER
[id number] [place] [id number]  Phone: [phone]  Fax: Anthony Casey MD        January 4, 2021     Patient: Ivory Bowden   YOB: 1935   Date of Visit: 1/4/2021       To Whom It May Concern: It is my medical opinion that Michelle Lam requires a disability parking placard for the following reasons:  She has limited walking ability due to an arthritic condition. Duration of need: 3 years    If you have any questions or concerns, please don't hesitate to call.     Sincerely,        Pillo Huntley MD

## 2021-01-16 ENCOUNTER — HOSPITAL ENCOUNTER (OUTPATIENT)
Dept: MAMMOGRAPHY | Age: 86
Discharge: HOME | End: 2021-01-18
Payer: MEDICARE

## 2021-01-16 DIAGNOSIS — Z12.31 BREAST CANCER SCREENING BY MAMMOGRAM: ICD-10-CM

## 2021-01-16 PROCEDURE — 77067 SCR MAMMO BI INCL CAD: CPT

## 2021-03-05 ENCOUNTER — IMMUNIZATION (OUTPATIENT)
Dept: PRIMARY CARE CLINIC | Age: 86
End: 2021-03-05
Payer: MEDICARE

## 2021-03-05 PROCEDURE — 91301 COVID-19, MODERNA VACCINE 100MCG/0.5ML DOSE: CPT | Performed by: NURSE PRACTITIONER

## 2021-03-05 PROCEDURE — 0011A COVID-19, MODERNA VACCINE 100MCG/0.5ML DOSE: CPT | Performed by: NURSE PRACTITIONER

## 2021-03-11 ENCOUNTER — OFFICE VISIT (OUTPATIENT)
Dept: FAMILY MEDICINE CLINIC | Age: 86
End: 2021-03-11
Payer: MEDICARE

## 2021-03-11 VITALS
HEART RATE: 63 BPM | RESPIRATION RATE: 18 BRPM | TEMPERATURE: 97.4 F | OXYGEN SATURATION: 99 % | DIASTOLIC BLOOD PRESSURE: 72 MMHG | HEIGHT: 62 IN | WEIGHT: 143 LBS | SYSTOLIC BLOOD PRESSURE: 132 MMHG | BODY MASS INDEX: 26.31 KG/M2

## 2021-03-11 DIAGNOSIS — E03.8 OTHER SPECIFIED HYPOTHYROIDISM: ICD-10-CM

## 2021-03-11 DIAGNOSIS — I10 ESSENTIAL HYPERTENSION: Primary | ICD-10-CM

## 2021-03-11 DIAGNOSIS — M35.3 PMR (POLYMYALGIA RHEUMATICA) (HCC): ICD-10-CM

## 2021-03-11 DIAGNOSIS — E78.49 OTHER HYPERLIPIDEMIA: ICD-10-CM

## 2021-03-11 PROCEDURE — 1090F PRES/ABSN URINE INCON ASSESS: CPT | Performed by: INTERNAL MEDICINE

## 2021-03-11 PROCEDURE — G8399 PT W/DXA RESULTS DOCUMENT: HCPCS | Performed by: INTERNAL MEDICINE

## 2021-03-11 PROCEDURE — 1036F TOBACCO NON-USER: CPT | Performed by: INTERNAL MEDICINE

## 2021-03-11 PROCEDURE — 99214 OFFICE O/P EST MOD 30 MIN: CPT | Performed by: INTERNAL MEDICINE

## 2021-03-11 PROCEDURE — G8484 FLU IMMUNIZE NO ADMIN: HCPCS | Performed by: INTERNAL MEDICINE

## 2021-03-11 PROCEDURE — G8417 CALC BMI ABV UP PARAM F/U: HCPCS | Performed by: INTERNAL MEDICINE

## 2021-03-11 PROCEDURE — 4040F PNEUMOC VAC/ADMIN/RCVD: CPT | Performed by: INTERNAL MEDICINE

## 2021-03-11 PROCEDURE — G8427 DOCREV CUR MEDS BY ELIG CLIN: HCPCS | Performed by: INTERNAL MEDICINE

## 2021-03-11 PROCEDURE — 1123F ACP DISCUSS/DSCN MKR DOCD: CPT | Performed by: INTERNAL MEDICINE

## 2021-03-11 RX ORDER — TRIAMTERENE AND HYDROCHLOROTHIAZIDE 37.5; 25 MG/1; MG/1
TABLET ORAL
Qty: 90 TABLET | Refills: 0 | Status: CANCELLED | OUTPATIENT
Start: 2021-03-11

## 2021-03-11 RX ORDER — CLONIDINE HYDROCHLORIDE 0.1 MG/1
0.1 TABLET ORAL 2 TIMES DAILY
Qty: 180 TABLET | Refills: 0 | Status: SHIPPED
Start: 2021-03-11 | End: 2021-05-13 | Stop reason: SDUPTHER

## 2021-03-11 RX ORDER — CLOPIDOGREL BISULFATE 75 MG/1
75 TABLET ORAL DAILY
Qty: 90 TABLET | Refills: 0 | Status: SHIPPED
Start: 2021-03-11 | End: 2021-05-13 | Stop reason: SDUPTHER

## 2021-03-11 RX ORDER — LOSARTAN POTASSIUM 50 MG/1
50 TABLET ORAL DAILY
Qty: 90 TABLET | Refills: 0 | Status: SHIPPED
Start: 2021-03-11 | End: 2021-05-13 | Stop reason: SDUPTHER

## 2021-03-11 RX ORDER — TRIAMTERENE AND HYDROCHLOROTHIAZIDE 37.5; 25 MG/1; MG/1
1 TABLET ORAL DAILY
Qty: 90 TABLET | Refills: 0 | Status: SHIPPED
Start: 2021-03-11 | End: 2021-05-13 | Stop reason: SDUPTHER

## 2021-03-11 RX ORDER — LEVOTHYROXINE SODIUM 0.05 MG/1
50 TABLET ORAL DAILY
Qty: 90 TABLET | Refills: 0 | Status: SHIPPED
Start: 2021-03-11 | End: 2021-05-13 | Stop reason: SDUPTHER

## 2021-03-11 RX ORDER — LOSARTAN POTASSIUM 25 MG/1
25 TABLET ORAL DAILY
Qty: 90 TABLET | Refills: 0 | Status: CANCELLED | OUTPATIENT
Start: 2021-03-11

## 2021-03-11 SDOH — ECONOMIC STABILITY: FOOD INSECURITY: WITHIN THE PAST 12 MONTHS, YOU WORRIED THAT YOUR FOOD WOULD RUN OUT BEFORE YOU GOT MONEY TO BUY MORE.: NEVER TRUE

## 2021-03-11 SDOH — ECONOMIC STABILITY: TRANSPORTATION INSECURITY
IN THE PAST 12 MONTHS, HAS THE LACK OF TRANSPORTATION KEPT YOU FROM MEDICAL APPOINTMENTS OR FROM GETTING MEDICATIONS?: NO

## 2021-03-11 SDOH — ECONOMIC STABILITY: INCOME INSECURITY: HOW HARD IS IT FOR YOU TO PAY FOR THE VERY BASICS LIKE FOOD, HOUSING, MEDICAL CARE, AND HEATING?: NOT HARD AT ALL

## 2021-03-11 SDOH — ECONOMIC STABILITY: TRANSPORTATION INSECURITY
IN THE PAST 12 MONTHS, HAS LACK OF TRANSPORTATION KEPT YOU FROM MEETINGS, WORK, OR FROM GETTING THINGS NEEDED FOR DAILY LIVING?: NO

## 2021-03-11 NOTE — PROGRESS NOTES
Chief Complaint   Patient presents with    Hypertension     med refills       HPI:  Patient is here for follow-up     Feel well    BP log is to review    No complaints      Allergy and Medications are reviewed and updated. Past Medical History, Surgical History, and Family History has been reviewed and updated. Review of Systems:  Review of Systems      Vitals:    03/11/21 0932   BP: 132/72   Site: Left Upper Arm   Position: Sitting   Cuff Size: Medium Adult   Pulse: 63   Resp: 18   Temp: 97.4 °F (36.3 °C)   TempSrc: Temporal   SpO2: 99%   Weight: 143 lb (64.9 kg)   Height: 5' 2\" (1.575 m)       Physical Exam  Vitals signs reviewed. Constitutional:       Appearance: She is well-developed. HENT:      Head: Normocephalic and atraumatic. Mouth/Throat:      Pharynx: No oropharyngeal exudate. Eyes:      Conjunctiva/sclera: Conjunctivae normal.      Pupils: Pupils are equal, round, and reactive to light. Neck:      Vascular: No JVD. Cardiovascular:      Rate and Rhythm: Normal rate and regular rhythm. Pulmonary:      Effort: Pulmonary effort is normal.      Breath sounds: Normal breath sounds. No rales. Abdominal:      General: Bowel sounds are normal.      Palpations: Abdomen is soft. Musculoskeletal: Normal range of motion. Lymphadenopathy:      Cervical: No cervical adenopathy. Skin:     General: Skin is warm and dry. Neurological:      Mental Status: She is alert and oriented to person, place, and time.    Psychiatric:         Behavior: Behavior normal.          Labs :    Lab Results   Component Value Date    WBC 7.0 12/29/2020    HGB 14.2 12/29/2020    HCT 45.8 12/29/2020     12/29/2020    CHOL 200 (H) 01/26/2018    TRIG 170 (H) 01/26/2018    HDL 48 12/29/2020    ALT 15 12/29/2020    AST 20 12/29/2020     12/29/2020    K 4.3 12/29/2020     12/29/2020    CREATININE 1.0 12/29/2020    BUN 26 (H) 12/29/2020    CO2 26 12/29/2020    TSH 2.860 12/29/2020    INR 0.9 08/17/2020    GLUF 81 12/29/2020    LABA1C 5.8 07/21/2015     Lab Results   Component Value Date    COLORU Yellow 12/29/2020    NITRU Negative 12/29/2020    GLUCOSEU Negative 12/29/2020    GLUCOSEU NEGATIVE 12/07/2011    KETUA Negative 12/29/2020    UROBILINOGEN 0.2 12/29/2020    BILIRUBINUR Negative 12/29/2020    [address] NEGATIVE 12/07/2011           ASSESSMENT     Patient Active Problem List    Diagnosis Date Noted    C. difficile colitis 02/11/2019    Other specified hypothyroidism 05/31/2018    Essential hypertension 02/21/2018    Other hyperlipidemia 02/21/2018     Overview Note:     Can not tolerate Statin      Myalgia 02/21/2018    PMR (polymyalgia rheumatica) ([facility] [address] 02/21/2018    Arthropathy of cervical facet joint 02/21/2018    Arthralgia of right hip 03/07/2016    Cerebral infarction [facility])      Overview Note:     Replacing Inactive Diagnoses      History of TIA (transient ischemic attack) 07/20/2015    Right cataract 02/04/2014    Left cataract 08/27/2013    Sprain of acromioclavicular ligament 06/04/2012     Overview Note:     replace inactive diagnosis      Shoulder pain 06/04/2012    Trochanteric bursitis 11/01/2011    Hip pain 11/01/2011        Diagnosis:     ICD-10-CM    1. Essential hypertension  I10 Comprehensive Metabolic Panel, Fasting   2. Other hyperlipidemia  E78.49 Lipid, Fasting   3. Other specified hypothyroidism  E03.8 TSH without Reflex   4. PMR (polymyalgia rheumatica) ([facility])  M35.3        PLAN:      BP log is reviewed    Will increase Cozzar to 50 mg and Maxzide to 25 mg daily    Monitor BP and Keep a log, Bring it with next visit      Pt is stable on current medical treatment. Continue current treatment plan    Side effects/Adverse effects/Precautions are reviewed with patient.      Low salt, Low Carb diet an low fat diet  Continue medications as advised and take them regularly  Regular exercises as advised    Discussed natural and expected course of this diagnosis and need to alert me if symptoms do not follow expected course, or if any worse. Smoking cessation if applicable, discussed with patient. Advise to have ( Fasting) lab test prior to next visit. CMP LIPID TSH       Patient Instructions   The medication list included in this document is our record of what you are currently taking, including any changes that were made at today's visit.  If you find any differences when compared to your medications at home, or have any questions that were not answered at your visit, please contact the office. Advise to have ( Fasting) lab test prior to next visit. Return in about 2 months (around 5/11/2021).

## 2021-04-05 ENCOUNTER — IMMUNIZATION (OUTPATIENT)
Dept: PRIMARY CARE CLINIC | Age: 86
End: 2021-04-05
Payer: MEDICARE

## 2021-04-05 PROCEDURE — 0012A COVID-19, MODERNA VACCINE 100MCG/0.5ML DOSE: CPT | Performed by: NURSE PRACTITIONER

## 2021-04-05 PROCEDURE — 91301 COVID-19, MODERNA VACCINE 100MCG/0.5ML DOSE: CPT | Performed by: NURSE PRACTITIONER

## 2021-05-10 DIAGNOSIS — E03.8 OTHER SPECIFIED HYPOTHYROIDISM: ICD-10-CM

## 2021-05-10 DIAGNOSIS — E78.49 OTHER HYPERLIPIDEMIA: ICD-10-CM

## 2021-05-10 DIAGNOSIS — I10 ESSENTIAL HYPERTENSION: ICD-10-CM

## 2021-05-10 LAB
ALBUMIN SERPL-MCNC: 4.2 G/DL (ref 3.5–5.2)
ALP BLD-CCNC: 118 U/L (ref 35–104)
ALT SERPL-CCNC: 8 U/L (ref 0–32)
ANION GAP SERPL CALCULATED.3IONS-SCNC: 13 MMOL/L (ref 7–16)
AST SERPL-CCNC: 20 U/L (ref 0–31)
BILIRUB SERPL-MCNC: 0.2 MG/DL (ref 0–1.2)
BUN BLDV-MCNC: 23 MG/DL (ref 6–23)
CALCIUM SERPL-MCNC: 10 MG/DL (ref 8.6–10.2)
CHLORIDE BLD-SCNC: 106 MMOL/L (ref 98–107)
CHOLESTEROL, FASTING: 231 MG/DL (ref 0–199)
CO2: 26 MMOL/L (ref 22–29)
CREAT SERPL-MCNC: 1 MG/DL (ref 0.5–1)
GFR AFRICAN AMERICAN: >60
GFR NON-AFRICAN AMERICAN: 53 ML/MIN/1.73
GLUCOSE FASTING: 73 MG/DL (ref 74–99)
HDLC SERPL-MCNC: 46 MG/DL
LDL CHOLESTEROL CALCULATED: 145 MG/DL (ref 0–99)
POTASSIUM SERPL-SCNC: 4.4 MMOL/L (ref 3.5–5)
SODIUM BLD-SCNC: 145 MMOL/L (ref 132–146)
TOTAL PROTEIN: 7 G/DL (ref 6.4–8.3)
TRIGLYCERIDE, FASTING: 198 MG/DL (ref 0–149)
TSH SERPL DL<=0.05 MIU/L-ACNC: 3.14 UIU/ML (ref 0.27–4.2)
VLDLC SERPL CALC-MCNC: 40 MG/DL

## 2021-05-13 ENCOUNTER — OFFICE VISIT (OUTPATIENT)
Dept: FAMILY MEDICINE CLINIC | Age: 86
End: 2021-05-13
Payer: MEDICARE

## 2021-05-13 VITALS
DIASTOLIC BLOOD PRESSURE: 66 MMHG | SYSTOLIC BLOOD PRESSURE: 126 MMHG | HEIGHT: 62 IN | BODY MASS INDEX: 27.31 KG/M2 | RESPIRATION RATE: 16 BRPM | HEART RATE: 77 BPM | TEMPERATURE: 96 F | WEIGHT: 148.4 LBS | OXYGEN SATURATION: 93 %

## 2021-05-13 DIAGNOSIS — K52.9 CHRONIC DIARRHEA: ICD-10-CM

## 2021-05-13 DIAGNOSIS — M35.3 PMR (POLYMYALGIA RHEUMATICA) (HCC): ICD-10-CM

## 2021-05-13 DIAGNOSIS — E78.49 OTHER HYPERLIPIDEMIA: ICD-10-CM

## 2021-05-13 DIAGNOSIS — I10 ESSENTIAL HYPERTENSION: Primary | ICD-10-CM

## 2021-05-13 DIAGNOSIS — E03.8 OTHER SPECIFIED HYPOTHYROIDISM: ICD-10-CM

## 2021-05-13 PROCEDURE — 1036F TOBACCO NON-USER: CPT | Performed by: INTERNAL MEDICINE

## 2021-05-13 PROCEDURE — G8417 CALC BMI ABV UP PARAM F/U: HCPCS | Performed by: INTERNAL MEDICINE

## 2021-05-13 PROCEDURE — G8399 PT W/DXA RESULTS DOCUMENT: HCPCS | Performed by: INTERNAL MEDICINE

## 2021-05-13 PROCEDURE — 1123F ACP DISCUSS/DSCN MKR DOCD: CPT | Performed by: INTERNAL MEDICINE

## 2021-05-13 PROCEDURE — 4040F PNEUMOC VAC/ADMIN/RCVD: CPT | Performed by: INTERNAL MEDICINE

## 2021-05-13 PROCEDURE — 1090F PRES/ABSN URINE INCON ASSESS: CPT | Performed by: INTERNAL MEDICINE

## 2021-05-13 PROCEDURE — G8427 DOCREV CUR MEDS BY ELIG CLIN: HCPCS | Performed by: INTERNAL MEDICINE

## 2021-05-13 PROCEDURE — 99214 OFFICE O/P EST MOD 30 MIN: CPT | Performed by: INTERNAL MEDICINE

## 2021-05-13 RX ORDER — LOSARTAN POTASSIUM 50 MG/1
50 TABLET ORAL DAILY
Qty: 90 TABLET | Refills: 0 | Status: SHIPPED
Start: 2021-05-13 | End: 2022-05-19 | Stop reason: SDUPTHER

## 2021-05-13 RX ORDER — LEVOTHYROXINE SODIUM 0.05 MG/1
50 TABLET ORAL DAILY
Qty: 90 TABLET | Refills: 0 | Status: SHIPPED
Start: 2021-05-13 | End: 2021-09-27 | Stop reason: SDUPTHER

## 2021-05-13 RX ORDER — CLOPIDOGREL BISULFATE 75 MG/1
75 TABLET ORAL DAILY
Qty: 90 TABLET | Refills: 0 | Status: SHIPPED
Start: 2021-05-13 | End: 2021-07-20

## 2021-05-13 RX ORDER — TRIAMTERENE AND HYDROCHLOROTHIAZIDE 37.5; 25 MG/1; MG/1
1 TABLET ORAL DAILY
Qty: 90 TABLET | Refills: 0 | Status: SHIPPED
Start: 2021-05-13 | End: 2021-06-14

## 2021-05-13 RX ORDER — CLONIDINE HYDROCHLORIDE 0.1 MG/1
0.1 TABLET ORAL 2 TIMES DAILY
Qty: 180 TABLET | Refills: 0 | Status: SHIPPED
Start: 2021-05-13 | End: 2021-09-27 | Stop reason: SDUPTHER

## 2021-05-13 RX ORDER — PREDNISONE 1 MG/1
4 TABLET ORAL DAILY
COMMUNITY
Start: 2021-03-27

## 2021-05-13 ASSESSMENT — ENCOUNTER SYMPTOMS
EYE PAIN: 0
SHORTNESS OF BREATH: 0
ABDOMINAL PAIN: 0
SINUS PAIN: 0
EYE DISCHARGE: 0
NAUSEA: 0
SORE THROAT: 0
BLOOD IN STOOL: 0

## 2021-05-13 NOTE — PROGRESS NOTES
Chief Complaint   Patient presents with    Discuss Labs     2 MONTH FOLLOW UP       HPI:  Patient is here for follow-up     Feel okay    Ch diarrhea off and on  Tried Gluten free diet with good results    FH of Gluten intolerance      Allergy and Medications are reviewed and updated. Past Medical History, Surgical History, and Family History has been reviewed and updated. Review of Systems:  Review of Systems   Constitutional: Negative for chills and fever. HENT: Negative for congestion, sinus pain and sore throat. Eyes: Negative for pain and discharge. Respiratory: Negative for shortness of breath (No new SOb). Cardiovascular: Negative for chest pain. Gastrointestinal: Negative for abdominal pain, blood in stool and nausea. Genitourinary: Negative for flank pain and frequency. Musculoskeletal: Negative for neck pain. Hematological: Does not bruise/bleed easily. Psychiatric/Behavioral: Negative for suicidal ideas. Vitals:    05/13/21 1000   BP: 126/66   Pulse: 77   Resp: 16   Temp: 96 °F (35.6 °C)   TempSrc: Oral   SpO2: 93%   Weight: 148 lb 6.4 oz (67.3 kg)   Height: 5' 2\" (1.575 m)       Physical Exam  Vitals signs reviewed. Constitutional:       Appearance: She is well-developed. HENT:      Head: Normocephalic and atraumatic. Mouth/Throat:      Pharynx: No oropharyngeal exudate. Eyes:      Conjunctiva/sclera: Conjunctivae normal.      Pupils: Pupils are equal, round, and reactive to light. Neck:      Vascular: No JVD. Cardiovascular:      Rate and Rhythm: Normal rate and regular rhythm. Pulmonary:      Effort: Pulmonary effort is normal.      Breath sounds: Normal breath sounds. No rales. Abdominal:      General: Bowel sounds are normal.      Palpations: Abdomen is soft. Musculoskeletal: Normal range of motion. Lymphadenopathy:      Cervical: No cervical adenopathy. Skin:     General: Skin is warm and dry.    Neurological:      Mental Status: She is alert and oriented to person, place, and time. Psychiatric:         Behavior: Behavior normal.          Labs :    Lab Results   Component Value Date    WBC 7.0 12/29/2020    HGB 14.2 12/29/2020    HCT 45.8 12/29/2020     12/29/2020    CHOL 200 (H) 01/26/2018    TRIG 170 (H) 01/26/2018    HDL 46 05/10/2021    ALT 8 05/10/2021    AST 20 05/10/2021     05/10/2021    K 4.4 05/10/2021     05/10/2021    CREATININE 1.0 05/10/2021    BUN 23 05/10/2021    CO2 26 05/10/2021    TSH 3.140 05/10/2021    INR 0.9 08/17/2020    GLUF 73 (L) 05/10/2021    LABA1C 5.8 07/21/2015     Lab Results   Component Value Date    COLORU Yellow 12/29/2020    NITRU Negative 12/29/2020    GLUCOSEU Negative 12/29/2020    GLUCOSEU NEGATIVE 12/07/2011    KETUA Negative 12/29/2020    UROBILINOGEN 0.2 12/29/2020    BILIRUBINUR Negative 12/29/2020    [address] NEGATIVE 12/07/2011             ASSESSMENT     Patient Active Problem List    Diagnosis Date Noted    C. difficile colitis 02/11/2019    Other specified hypothyroidism 05/31/2018    Essential hypertension 02/21/2018    Other hyperlipidemia 02/21/2018     Overview Note:     Can not tolerate Statin      Myalgia 02/21/2018    PMR (polymyalgia rheumatica) ([facility] [address] 02/21/2018    Arthropathy of cervical facet joint 02/21/2018    Arthralgia of right hip 03/07/2016    Cerebral infarction [facility])      Overview Note:     Replacing Inactive Diagnoses      History of TIA (transient ischemic attack) 07/20/2015    Right cataract 02/04/2014    Left cataract 08/27/2013    Sprain of acromioclavicular ligament 06/04/2012     Overview Note:     replace inactive diagnosis      Shoulder pain 06/04/2012    Trochanteric bursitis 11/01/2011    Hip pain 11/01/2011        Diagnosis:     ICD-10-CM    1.  Essential hypertension  I10 cloNIDine (CATAPRES) 0.1 MG tablet     clopidogrel (PLAVIX) 75 MG tablet     losartan (COZAAR) 50 MG tablet     triamterene-hydroCHLOROthiazide (MAXZIDE-25) 37.5-25 MG per tablet   2. Other hyperlipidemia  E78.49    3. Other specified hypothyroidism  E03.8 levothyroxine (SYNTHROID) 50 MCG tablet   4. PMR (polymyalgia rheumatica) ([facility])  M35.3    5. Chronic diarrhea  K52.9 TISSUE TRANSGLUTAMINASE, IGG     TISSUE TRANSGLUTAMINASE, IGA       PLAN:      Recent labs reviewed  Lipids  TSH  CMP - noted    Can not tolerate Statins    Adv to f/u with diet, exe for her cholesterol\    Will check for Celiac screen    Pt is stable on current medical treatment. Continue current treatment plan    Side effects/Adverse effects/Precautions are reviewed with patient. Low salt, Low Carb diet an low fat diet  Continue medications as advised and take them regularly  Regular exercises as advised    Discussed natural and expected course of this diagnosis and need to alert me if symptoms do not follow expected course, or if any worse. Smoking cessation if applicable, discussed with patient. Patient Instructions   The medication list included in this document is our record of what you are currently taking, including any changes that were made at today's visit.  If you find any differences when compared to your medications at home, or have any questions that were not answered at your visit, please contact the office. Return in about 2 months (around 7/13/2021).

## 2021-05-25 DIAGNOSIS — K52.9 CHRONIC DIARRHEA: ICD-10-CM

## 2021-05-26 LAB — IGA: 175 MG/DL (ref 70–400)

## 2021-05-30 LAB
TISSUE TRANSGLUTAMINASE ANTIBODY IGG: <0.6 U/ML
TISSUE TRANSGLUTAMINASE IGA: 0.4 U/ML

## 2021-06-11 DIAGNOSIS — I10 ESSENTIAL HYPERTENSION: ICD-10-CM

## 2021-06-14 RX ORDER — TRIAMTERENE AND HYDROCHLOROTHIAZIDE 37.5; 25 MG/1; MG/1
TABLET ORAL
Qty: 90 TABLET | Refills: 0 | Status: SHIPPED
Start: 2021-06-14 | End: 2021-12-20 | Stop reason: SDUPTHER

## 2021-06-22 LAB
ALBUMIN SERPL-MCNC: 1.5 G/DL
ALP BLD-CCNC: 103 U/L
ALT SERPL-CCNC: 11 U/L
ANION GAP SERPL CALCULATED.3IONS-SCNC: NORMAL MMOL/L
AST SERPL-CCNC: 17 U/L
BASOPHILS ABSOLUTE: 0.3 /ΜL
BASOPHILS RELATIVE PERCENT: 22 %
BILIRUB SERPL-MCNC: 0.3 MG/DL (ref 0.1–1.4)
BUN BLDV-MCNC: 32 MG/DL
CALCIUM SERPL-MCNC: 9.7 MG/DL
CHLORIDE BLD-SCNC: 105 MMOL/L
CO2: 27 MMOL/L
CREAT SERPL-MCNC: 1.07 MG/DL
EOSINOPHILS ABSOLUTE: 2.8 /ΜL
EOSINOPHILS RELATIVE PERCENT: 202 %
GFR CALCULATED: 55
GLUCOSE BLD-MCNC: 105 MG/DL
HCT VFR BLD CALC: 40 % (ref 36–46)
HEMOGLOBIN: 13.4 G/DL (ref 12–16)
LYMPHOCYTES ABSOLUTE: 27.1 /ΜL
LYMPHOCYTES RELATIVE PERCENT: 1951 %
MCH RBC QN AUTO: 31.1 PG
MCHC RBC AUTO-ENTMCNC: 33.5 G/DL
MCV RBC AUTO: 92.8 FL
MONOCYTES ABSOLUTE: 8 /ΜL
MONOCYTES RELATIVE PERCENT: 576 %
NEUTROPHILS ABSOLUTE: 61.8 /ΜL
NEUTROPHILS RELATIVE PERCENT: 4450 %
PDW BLD-RTO: 12.4 %
PLATELET # BLD: 229 K/ΜL
PMV BLD AUTO: 9.9 FL
POTASSIUM SERPL-SCNC: 4.2 MMOL/L
RBC # BLD: 4.31 10^6/ΜL
SODIUM BLD-SCNC: 140 MMOL/L
TOTAL PROTEIN: 6.4
WBC # BLD: 7.2 10^3/ML

## 2021-07-20 ENCOUNTER — OFFICE VISIT (OUTPATIENT)
Dept: FAMILY MEDICINE CLINIC | Age: 86
End: 2021-07-20
Payer: MEDICARE

## 2021-07-20 VITALS
OXYGEN SATURATION: 96 % | BODY MASS INDEX: 26.94 KG/M2 | HEIGHT: 62 IN | SYSTOLIC BLOOD PRESSURE: 130 MMHG | WEIGHT: 146.4 LBS | DIASTOLIC BLOOD PRESSURE: 70 MMHG | RESPIRATION RATE: 16 BRPM | HEART RATE: 59 BPM | TEMPERATURE: 96.7 F

## 2021-07-20 DIAGNOSIS — I10 ESSENTIAL HYPERTENSION: Primary | ICD-10-CM

## 2021-07-20 DIAGNOSIS — K52.9 CHRONIC DIARRHEA: ICD-10-CM

## 2021-07-20 DIAGNOSIS — E78.49 OTHER HYPERLIPIDEMIA: ICD-10-CM

## 2021-07-20 DIAGNOSIS — E03.8 OTHER SPECIFIED HYPOTHYROIDISM: ICD-10-CM

## 2021-07-20 DIAGNOSIS — M35.3 PMR (POLYMYALGIA RHEUMATICA) (HCC): ICD-10-CM

## 2021-07-20 PROBLEM — L82.0 INFLAMED SEBORRHEIC KERATOSIS: Status: ACTIVE | Noted: 2021-07-20

## 2021-07-20 PROBLEM — L57.0 ACTINIC KERATOSIS: Status: ACTIVE | Noted: 2021-07-20

## 2021-07-20 PROBLEM — D17.30 LIPOMA OF SKIN AND SUBCUTANEOUS TISSUE (EXCLUDING FACE): Status: ACTIVE | Noted: 2021-07-20

## 2021-07-20 PROBLEM — R23.4 CHANGES IN SKIN TEXTURE: Status: ACTIVE | Noted: 2021-07-20

## 2021-07-20 PROCEDURE — 99214 OFFICE O/P EST MOD 30 MIN: CPT | Performed by: INTERNAL MEDICINE

## 2021-07-20 PROCEDURE — G8417 CALC BMI ABV UP PARAM F/U: HCPCS | Performed by: INTERNAL MEDICINE

## 2021-07-20 PROCEDURE — 4040F PNEUMOC VAC/ADMIN/RCVD: CPT | Performed by: INTERNAL MEDICINE

## 2021-07-20 PROCEDURE — G8399 PT W/DXA RESULTS DOCUMENT: HCPCS | Performed by: INTERNAL MEDICINE

## 2021-07-20 PROCEDURE — 1090F PRES/ABSN URINE INCON ASSESS: CPT | Performed by: INTERNAL MEDICINE

## 2021-07-20 PROCEDURE — 1036F TOBACCO NON-USER: CPT | Performed by: INTERNAL MEDICINE

## 2021-07-20 PROCEDURE — 1123F ACP DISCUSS/DSCN MKR DOCD: CPT | Performed by: INTERNAL MEDICINE

## 2021-07-20 PROCEDURE — G8427 DOCREV CUR MEDS BY ELIG CLIN: HCPCS | Performed by: INTERNAL MEDICINE

## 2021-07-20 RX ORDER — ASPIRIN 81 MG/1
81 TABLET ORAL DAILY
COMMUNITY

## 2021-07-20 ASSESSMENT — ENCOUNTER SYMPTOMS
SINUS PAIN: 0
EYE PAIN: 0
EYE DISCHARGE: 0
SORE THROAT: 0
ABDOMINAL PAIN: 0
SHORTNESS OF BREATH: 0
NAUSEA: 0
BLOOD IN STOOL: 0

## 2021-07-20 NOTE — PROGRESS NOTES
Chief Complaint   Patient presents with   Valdemar Lloyd Medications     Dr Sheri Santamaria changed her from taking Plavix to ASA 81 due to excessive bruising        HPI:  Patient is here for follow-up     Above noted    On ASA 81 mg in place of Plavix- Dr Sheri Santamaria    Has ch diarrhea, trying to adjust her diet. Carolee Mendoza free    Allergy and Medications are reviewed and updated. Past Medical History, Surgical History, and Family History has been reviewed and updated. Review of Systems:  Review of Systems   Constitutional: Negative for chills and fever. HENT: Negative for congestion, sinus pain and sore throat. Eyes: Negative for pain and discharge. Respiratory: Negative for shortness of breath (No new SOb). Cardiovascular: Negative for chest pain. Gastrointestinal: Negative for abdominal pain, blood in stool and nausea. Genitourinary: Negative for flank pain and frequency. Musculoskeletal: Negative for neck pain. Hematological: Does not bruise/bleed easily. Psychiatric/Behavioral: Negative for suicidal ideas. Vitals:    07/20/21 1002   BP: 130/70   Pulse: 59   Resp: 16   Temp: 96.7 °F (35.9 °C)   TempSrc: Temporal   SpO2: 96%   Weight: 146 lb 6.4 oz (66.4 kg)   Height: 5' 2\" (1.575 m)       Physical Exam  Vitals reviewed. Constitutional:       Appearance: She is well-developed. HENT:      Head: Normocephalic and atraumatic. Mouth/Throat:      Pharynx: No oropharyngeal exudate. Eyes:      Conjunctiva/sclera: Conjunctivae normal.      Pupils: Pupils are equal, round, and reactive to light. Neck:      Vascular: No JVD. Cardiovascular:      Rate and Rhythm: Normal rate and regular rhythm. Pulmonary:      Effort: Pulmonary effort is normal.      Breath sounds: Normal breath sounds. No rales. Abdominal:      General: Bowel sounds are normal.      Palpations: Abdomen is soft. Musculoskeletal:         General: Normal range of motion.    Lymphadenopathy:      Cervical: No cervical adenopathy. Skin:     General: Skin is warm and dry. Neurological:      Mental Status: She is alert and oriented to person, place, and time.    Psychiatric:         Behavior: Behavior normal.          Labs :    Lab Results   Component Value Date    WBC 7.2 06/22/2021    HGB 13.4 06/22/2021    HCT 40.0 06/22/2021     06/22/2021    CHOL 200 (H) 01/26/2018    TRIG 170 (H) 01/26/2018    HDL 46 05/10/2021    ALT 11 06/22/2021    AST 17 06/22/2021     06/22/2021    K 4.2 06/22/2021     06/22/2021    CREATININE 1.07 06/22/2021    BUN 32 06/22/2021    CO2 27 06/22/2021    TSH 3.140 05/10/2021    INR 0.9 08/17/2020    GLUF 73 (L) 05/10/2021    LABA1C 5.8 07/21/2015     Lab Results   Component Value Date    COLORU Yellow 12/29/2020    NITRU Negative 12/29/2020    GLUCOSEU Negative 12/29/2020    GLUCOSEU NEGATIVE 12/07/2011    KETUA Negative 12/29/2020    UROBILINOGEN 0.2 12/29/2020    BILIRUBINUR Negative 12/29/2020    [address] [place] [address] NEGATIVE 12/07/2011             ASSESSMENT     Patient Active Problem List    Diagnosis Date Noted    Actinic keratosis 07/20/2021    Changes in skin texture 07/20/2021    Inflamed seborrheic keratosis 07/20/2021    Lipoma of skin and subcutaneous tissue (excluding face) 07/20/2021    C. difficile colitis 02/11/2019    Other specified hypothyroidism 05/31/2018    Essential hypertension 02/21/2018    Other hyperlipidemia 02/21/2018     Overview Note:     Can not tolerate Statin      Myalgia 02/21/2018    PMR (polymyalgia rheumatica) ([address] 02/21/2018    Arthropathy of cervical facet joint 02/21/2018    Arthralgia of right hip 03/07/2016    Cerebral infarction [facility])      Overview Note:     Replacing Inactive Diagnoses      History of TIA (transient ischemic attack) 07/20/2015    Right cataract 02/04/2014    Left cataract 08/27/2013    Sprain of acromioclavicular ligament 06/04/2012     Overview Note:     replace inactive diagnosis      Shoulder pain

## 2021-09-27 DIAGNOSIS — I10 ESSENTIAL HYPERTENSION: ICD-10-CM

## 2021-09-27 DIAGNOSIS — E03.8 OTHER SPECIFIED HYPOTHYROIDISM: ICD-10-CM

## 2021-09-27 RX ORDER — CLONIDINE HYDROCHLORIDE 0.1 MG/1
0.1 TABLET ORAL 2 TIMES DAILY
Qty: 180 TABLET | Refills: 0 | Status: SHIPPED
Start: 2021-09-27 | End: 2021-12-20 | Stop reason: SDUPTHER

## 2021-09-27 RX ORDER — LEVOTHYROXINE SODIUM 0.05 MG/1
50 TABLET ORAL DAILY
Qty: 90 TABLET | Refills: 0 | Status: SHIPPED
Start: 2021-09-27 | End: 2021-12-20 | Stop reason: SDUPTHER

## 2021-10-20 ENCOUNTER — OFFICE VISIT (OUTPATIENT)
Dept: FAMILY MEDICINE CLINIC | Age: 86
End: 2021-10-20
Payer: MEDICARE

## 2021-10-20 VITALS
OXYGEN SATURATION: 97 % | DIASTOLIC BLOOD PRESSURE: 72 MMHG | WEIGHT: 143 LBS | HEIGHT: 62 IN | RESPIRATION RATE: 16 BRPM | HEART RATE: 81 BPM | TEMPERATURE: 96.8 F | BODY MASS INDEX: 26.31 KG/M2 | SYSTOLIC BLOOD PRESSURE: 148 MMHG

## 2021-10-20 DIAGNOSIS — M35.3 PMR (POLYMYALGIA RHEUMATICA) (HCC): ICD-10-CM

## 2021-10-20 DIAGNOSIS — Z01.818 PRE-OP EXAM: Primary | ICD-10-CM

## 2021-10-20 DIAGNOSIS — Z86.73 HISTORY OF TIA (TRANSIENT ISCHEMIC ATTACK): ICD-10-CM

## 2021-10-20 DIAGNOSIS — E78.49 OTHER HYPERLIPIDEMIA: ICD-10-CM

## 2021-10-20 DIAGNOSIS — I10 ESSENTIAL HYPERTENSION: ICD-10-CM

## 2021-10-20 PROCEDURE — 4040F PNEUMOC VAC/ADMIN/RCVD: CPT | Performed by: INTERNAL MEDICINE

## 2021-10-20 PROCEDURE — 1090F PRES/ABSN URINE INCON ASSESS: CPT | Performed by: INTERNAL MEDICINE

## 2021-10-20 PROCEDURE — G8417 CALC BMI ABV UP PARAM F/U: HCPCS | Performed by: INTERNAL MEDICINE

## 2021-10-20 PROCEDURE — G8427 DOCREV CUR MEDS BY ELIG CLIN: HCPCS | Performed by: INTERNAL MEDICINE

## 2021-10-20 PROCEDURE — 1036F TOBACCO NON-USER: CPT | Performed by: INTERNAL MEDICINE

## 2021-10-20 PROCEDURE — G8484 FLU IMMUNIZE NO ADMIN: HCPCS | Performed by: INTERNAL MEDICINE

## 2021-10-20 PROCEDURE — G8399 PT W/DXA RESULTS DOCUMENT: HCPCS | Performed by: INTERNAL MEDICINE

## 2021-10-20 PROCEDURE — G0008 ADMIN INFLUENZA VIRUS VAC: HCPCS | Performed by: INTERNAL MEDICINE

## 2021-10-20 PROCEDURE — 90694 VACC AIIV4 NO PRSRV 0.5ML IM: CPT | Performed by: INTERNAL MEDICINE

## 2021-10-20 PROCEDURE — 1123F ACP DISCUSS/DSCN MKR DOCD: CPT | Performed by: INTERNAL MEDICINE

## 2021-10-20 PROCEDURE — 99214 OFFICE O/P EST MOD 30 MIN: CPT | Performed by: INTERNAL MEDICINE

## 2021-10-20 ASSESSMENT — ENCOUNTER SYMPTOMS
EYE PAIN: 0
SORE THROAT: 0
BLOOD IN STOOL: 0
EYE DISCHARGE: 0
SINUS PAIN: 0
SHORTNESS OF BREATH: 0
NAUSEA: 0
ABDOMINAL PAIN: 0

## 2021-10-20 NOTE — PROGRESS NOTES
Chief Complaint   Patient presents with    Pre-op Exam    Medication Refill     flu vacc, shingles vacc       HPI:  Patient is here for follow-up     Feeling okay    For Rt hip tendon repair surgery at [facility] 11/8/21    Pt is seen by Dr Christina Sow and has cardiac clearance        Allergy and Medications are reviewed and updated. Past Medical History, Surgical History, and Family History has been reviewed and updated. Review of Systems:  Review of Systems   Constitutional: Negative for chills and fever. HENT: Negative for congestion, sinus pain and sore throat. Eyes: Negative for pain and discharge. Respiratory: Negative for shortness of breath (No new SOb). Cardiovascular: Negative for chest pain. Gastrointestinal: Negative for abdominal pain, blood in stool and nausea. Genitourinary: Negative for flank pain and frequency. Musculoskeletal: Negative for neck pain. Hematological: Does not bruise/bleed easily. Psychiatric/Behavioral: Negative for suicidal ideas. Vitals:    10/20/21 1415   BP: (!) 148/72   Site: Left Upper Arm   Position: Sitting   Cuff Size: Medium Adult   Pulse: 81   Resp: 16   Temp: 96.8 °F (36 °C)   TempSrc: Temporal   SpO2: 97%   Weight: 143 lb (64.9 kg)   Height: 5' 2\" (1.575 m)       Physical Exam  Vitals reviewed. Constitutional:       Appearance: She is well-developed. HENT:      Head: Normocephalic and atraumatic. Mouth/Throat:      Pharynx: No oropharyngeal exudate. Eyes:      Conjunctiva/sclera: Conjunctivae normal.      Pupils: Pupils are equal, round, and reactive to light. Neck:      Vascular: No JVD. Cardiovascular:      Rate and Rhythm: Normal rate and regular rhythm. Pulmonary:      Effort: Pulmonary effort is normal.      Breath sounds: Normal breath sounds. No rales. Abdominal:      General: Bowel sounds are normal.      Palpations: Abdomen is soft. Musculoskeletal:         General: Normal range of motion.    Lymphadenopathy: Cervical: No cervical adenopathy. Skin:     General: Skin is warm and dry. Neurological:      Mental Status: She is alert and oriented to person, place, and time.    Psychiatric:         Behavior: Behavior normal.          Labs :    Lab Results   Component Value Date    WBC 7.2 06/22/2021    HGB 13.4 06/22/2021    HCT 40.0 06/22/2021     06/22/2021    CHOL 200 (H) 01/26/2018    TRIG 170 (H) 01/26/2018    HDL 46 05/10/2021    ALT 11 06/22/2021    AST 17 06/22/2021     06/22/2021    K 4.2 06/22/2021     06/22/2021    CREATININE 1.07 06/22/2021    BUN 32 06/22/2021    CO2 27 06/22/2021    TSH 3.140 05/10/2021    INR 0.9 08/17/2020    GLUF 73 (L) 05/10/2021    LABA1C 5.8 07/21/2015     Lab Results   Component Value Date    COLORU Yellow 12/29/2020    NITRU Negative 12/29/2020    GLUCOSEU Negative 12/29/2020    GLUCOSEU NEGATIVE 12/07/2011    KETUA Negative 12/29/2020    UROBILINOGEN 0.2 12/29/2020    BILIRUBINUR Negative 12/29/2020    [address] [place] [address] NEGATIVE 12/07/2011             ASSESSMENT     Patient Active Problem List    Diagnosis Date Noted    Actinic keratosis 07/20/2021    Changes in skin texture 07/20/2021    Inflamed seborrheic keratosis 07/20/2021    Lipoma of skin and subcutaneous tissue (excluding face) 07/20/2021    C. difficile colitis 02/11/2019    Other specified hypothyroidism 05/31/2018    Essential hypertension 02/21/2018    Other hyperlipidemia 02/21/2018     Overview Note:     Can not tolerate Statin      Myalgia 02/21/2018    PMR (polymyalgia rheumatica) ([address] 02/21/2018    Arthropathy of cervical facet joint 02/21/2018    Arthralgia of right hip 03/07/2016    Cerebral infarction [facility])      Overview Note:     Replacing Inactive Diagnoses      History of TIA (transient ischemic attack) 07/20/2015    Right cataract 02/04/2014    Left cataract 08/27/2013    Sprain of acromioclavicular ligament 06/04/2012     Overview Note:     replace inactive diagnosis      Shoulder pain 06/04/2012    Trochanteric bursitis 11/01/2011    Hip pain 11/01/2011        Diagnosis:     ICD-10-CM    1. Pre-op exam  Z01.818 EKG 12 lead   2. Other hyperlipidemia  E78.49    3. Essential hypertension  I10    4. PMR (polymyalgia rheumatica) (HCC)  M35.3    5. History of TIA (transient ischemic attack)  Z86.73        PLAN:      Pt has cardiac clearance by Dr Les Turk    Had CBC and CMP - 6/2021, will sent reports    EKG by Dr Les Turk last week    Medically stable for surgery    Pt is stable on current medical treatment. Continue current treatment plan    Side effects/Adverse effects/Precautions are reviewed with patient. Low salt, Low Carb diet an low fat diet  Continue medications as advised and take them regularly  Regular exercises as advised    Discussed natural and expected course of this diagnosis and need to alert me if symptoms do not follow expected course, or if any worse. Smoking cessation if applicable, discussed with patient. Patient Instructions   The medication list included in this document is our record of what you are currently taking, including any changes that were made at today's visit.  If you find any differences when compared to your medications at home, or have any questions that were not answered at your visit, please contact the office.           Return in about 2 months (around 12/20/2021) for Annual Medicare Wellness Exam.

## 2021-11-05 ENCOUNTER — HOSPITAL ENCOUNTER (OUTPATIENT)
Dept: MAMMOGRAPHY | Age: 86
Discharge: HOME | End: 2021-11-07
Payer: MEDICARE

## 2021-11-05 DIAGNOSIS — M85.80 OTHER SPECIFIED DISORDERS OF BONE DENSITY AND STRUCTURE, UNSPECIFIED SITE: ICD-10-CM

## 2021-11-05 DIAGNOSIS — Z79.52 LONG TERM CURRENT USE OF SYSTEMIC STEROIDS: ICD-10-CM

## 2021-11-05 PROCEDURE — 77080 DXA BONE DENSITY AXIAL: CPT

## 2021-11-08 ENCOUNTER — HOSPITAL ENCOUNTER (OUTPATIENT)
Age: 86
Discharge: HOME | End: 2021-11-10

## 2021-11-08 LAB
ABO/RH: NORMAL
ANTIBODY SCREEN: NORMAL

## 2021-11-08 PROCEDURE — 86901 BLOOD TYPING SEROLOGIC RH(D): CPT

## 2021-11-08 PROCEDURE — 86900 BLOOD TYPING SEROLOGIC ABO: CPT

## 2021-11-08 PROCEDURE — 86850 RBC ANTIBODY SCREEN: CPT

## 2021-11-09 ENCOUNTER — HOSPITAL ENCOUNTER (OUTPATIENT)
Age: 86
Discharge: HOME | End: 2021-11-11

## 2021-11-09 LAB
ANION GAP SERPL CALCULATED.3IONS-SCNC: 9 MMOL/L (ref 7–16)
BUN BLDV-MCNC: 21 MG/DL (ref 6–23)
CALCIUM SERPL-MCNC: 9.6 MG/DL (ref 8.6–10.2)
CHLORIDE BLD-SCNC: 104 MMOL/L (ref 98–107)
CO2: 26 MMOL/L (ref 22–29)
CREAT SERPL-MCNC: 1.1 MG/DL (ref 0.5–1)
GFR AFRICAN AMERICAN: 57
GFR NON-AFRICAN AMERICAN: 47 ML/MIN/1.73
GLUCOSE BLD-MCNC: 109 MG/DL (ref 74–99)
HCT VFR BLD CALC: 32.5 % (ref 34–48)
HEMOGLOBIN: 10.1 G/DL (ref 11.5–15.5)
MCH RBC QN AUTO: 30.1 PG (ref 26–35)
MCHC RBC AUTO-ENTMCNC: 31.1 % (ref 32–34.5)
MCV RBC AUTO: 97 FL (ref 80–99.9)
PDW BLD-RTO: 14 FL (ref 11.5–15)
PLATELET # BLD: 216 E9/L (ref 130–450)
PMV BLD AUTO: 10.4 FL (ref 7–12)
POTASSIUM SERPL-SCNC: 4.1 MMOL/L (ref 3.5–5)
RBC # BLD: 3.35 E12/L (ref 3.5–5.5)
SODIUM BLD-SCNC: 139 MMOL/L (ref 132–146)
WBC # BLD: 10.3 E9/L (ref 4.5–11.5)

## 2021-11-09 PROCEDURE — 85027 COMPLETE CBC AUTOMATED: CPT

## 2021-11-09 PROCEDURE — 80048 BASIC METABOLIC PNL TOTAL CA: CPT

## 2021-11-10 ENCOUNTER — HOSPITAL ENCOUNTER (OUTPATIENT)
Age: 86
Discharge: HOME | End: 2021-11-12

## 2021-11-10 LAB
ANION GAP SERPL CALCULATED.3IONS-SCNC: 10 MMOL/L (ref 7–16)
BUN BLDV-MCNC: 17 MG/DL (ref 6–23)
CALCIUM SERPL-MCNC: 9.9 MG/DL (ref 8.6–10.2)
CHLORIDE BLD-SCNC: 106 MMOL/L (ref 98–107)
CO2: 24 MMOL/L (ref 22–29)
CREAT SERPL-MCNC: 1.1 MG/DL (ref 0.5–1)
GFR AFRICAN AMERICAN: 57
GFR NON-AFRICAN AMERICAN: 47 ML/MIN/1.73
GLUCOSE BLD-MCNC: 98 MG/DL (ref 74–99)
HCT VFR BLD CALC: 30.4 % (ref 34–48)
HEMOGLOBIN: 9.7 G/DL (ref 11.5–15.5)
MCH RBC QN AUTO: 30.6 PG (ref 26–35)
MCHC RBC AUTO-ENTMCNC: 31.9 % (ref 32–34.5)
MCV RBC AUTO: 95.9 FL (ref 80–99.9)
PDW BLD-RTO: 14.1 FL (ref 11.5–15)
PLATELET # BLD: 209 E9/L (ref 130–450)
PMV BLD AUTO: 10.5 FL (ref 7–12)
POTASSIUM SERPL-SCNC: 4.3 MMOL/L (ref 3.5–5)
RBC # BLD: 3.17 E12/L (ref 3.5–5.5)
SODIUM BLD-SCNC: 140 MMOL/L (ref 132–146)
WBC # BLD: 9.6 E9/L (ref 4.5–11.5)

## 2021-11-10 PROCEDURE — 85027 COMPLETE CBC AUTOMATED: CPT

## 2021-11-10 PROCEDURE — 80048 BASIC METABOLIC PNL TOTAL CA: CPT

## 2021-11-19 PROBLEM — M15.9 OSTEOARTHRITIS OF MULTIPLE JOINTS: Status: ACTIVE | Noted: 2021-11-19

## 2021-11-19 PROBLEM — I77.9 DISORDER OF CAROTID ARTERY (HCC): Status: ACTIVE | Noted: 2021-11-19

## 2021-11-19 PROBLEM — R52 INADEQUATE PAIN CONTROL: Status: ACTIVE | Noted: 2021-11-19

## 2021-11-19 PROBLEM — Z96.659 HISTORY OF TOTAL KNEE REPLACEMENT: Status: ACTIVE | Noted: 2021-11-19

## 2021-11-19 PROBLEM — D64.9 ANEMIA: Status: ACTIVE | Noted: 2021-11-19

## 2021-11-19 PROBLEM — R73.01 IMPAIRED FASTING GLUCOSE: Status: ACTIVE | Noted: 2021-11-19

## 2021-12-20 ENCOUNTER — PATIENT MESSAGE (OUTPATIENT)
Dept: FAMILY MEDICINE CLINIC | Age: 86
End: 2021-12-20

## 2021-12-20 DIAGNOSIS — I10 ESSENTIAL HYPERTENSION: ICD-10-CM

## 2021-12-20 DIAGNOSIS — E03.8 OTHER SPECIFIED HYPOTHYROIDISM: ICD-10-CM

## 2021-12-20 RX ORDER — TRIAMTERENE AND HYDROCHLOROTHIAZIDE 37.5; 25 MG/1; MG/1
TABLET ORAL
Qty: 90 TABLET | Refills: 0 | Status: SHIPPED
Start: 2021-12-20 | End: 2021-12-22 | Stop reason: SDUPTHER

## 2021-12-20 RX ORDER — LEVOTHYROXINE SODIUM 0.05 MG/1
50 TABLET ORAL DAILY
Qty: 90 TABLET | Refills: 0 | Status: SHIPPED
Start: 2021-12-20 | End: 2021-12-22 | Stop reason: SDUPTHER

## 2021-12-20 RX ORDER — CLONIDINE HYDROCHLORIDE 0.1 MG/1
0.1 TABLET ORAL 2 TIMES DAILY
Qty: 180 TABLET | Refills: 0 | Status: SHIPPED
Start: 2021-12-20 | End: 2021-12-22 | Stop reason: SDUPTHER

## 2021-12-20 NOTE — TELEPHONE ENCOUNTER
From: Hang Quinn  To: Dr. Peter Adams: 2021 11:13 AM EST  Subject: Need refills    Need Rx refills for Levothyroxin, Clonidine and Triamterine . Since December visit was canceled, I do not have enough to last until a next visit.

## 2021-12-22 DIAGNOSIS — I10 ESSENTIAL HYPERTENSION: ICD-10-CM

## 2021-12-22 DIAGNOSIS — E03.8 OTHER SPECIFIED HYPOTHYROIDISM: ICD-10-CM

## 2021-12-22 RX ORDER — CLONIDINE HYDROCHLORIDE 0.1 MG/1
0.1 TABLET ORAL 2 TIMES DAILY
Qty: 180 TABLET | Refills: 0 | Status: SHIPPED
Start: 2021-12-22 | End: 2022-05-19 | Stop reason: SDUPTHER

## 2021-12-22 RX ORDER — TRIAMTERENE AND HYDROCHLOROTHIAZIDE 37.5; 25 MG/1; MG/1
TABLET ORAL
Qty: 90 TABLET | Refills: 0 | Status: SHIPPED
Start: 2021-12-22 | End: 2022-05-19 | Stop reason: SDUPTHER

## 2021-12-22 RX ORDER — LEVOTHYROXINE SODIUM 0.05 MG/1
50 TABLET ORAL DAILY
Qty: 90 TABLET | Refills: 0 | Status: SHIPPED
Start: 2021-12-22 | End: 2022-05-19 | Stop reason: SDUPTHER

## 2022-01-11 DIAGNOSIS — Z12.31 BREAST CANCER SCREENING BY MAMMOGRAM: Primary | ICD-10-CM

## 2022-02-04 ASSESSMENT — PATIENT HEALTH QUESTIONNAIRE - PHQ9
SUM OF ALL RESPONSES TO PHQ QUESTIONS 1-9: 0
SUM OF ALL RESPONSES TO PHQ QUESTIONS 1-9: 0
SUM OF ALL RESPONSES TO PHQ9 QUESTIONS 1 & 2: 0
SUM OF ALL RESPONSES TO PHQ QUESTIONS 1-9: 0
2. FEELING DOWN, DEPRESSED OR HOPELESS: 0
SUM OF ALL RESPONSES TO PHQ QUESTIONS 1-9: 0
1. LITTLE INTEREST OR PLEASURE IN DOING THINGS: 0

## 2022-02-04 ASSESSMENT — LIFESTYLE VARIABLES
AUDIT-C TOTAL SCORE: INCOMPLETE
HOW OFTEN DO YOU HAVE A DRINK CONTAINING ALCOHOL: NEVER
HOW OFTEN DO YOU HAVE A DRINK CONTAINING ALCOHOL: 0
AUDIT TOTAL SCORE: INCOMPLETE

## 2022-02-16 ENCOUNTER — OFFICE VISIT (OUTPATIENT)
Dept: FAMILY MEDICINE CLINIC | Age: 87
End: 2022-02-16
Payer: MEDICARE

## 2022-02-16 VITALS
DIASTOLIC BLOOD PRESSURE: 84 MMHG | WEIGHT: 141.6 LBS | OXYGEN SATURATION: 96 % | BODY MASS INDEX: 26.06 KG/M2 | HEIGHT: 62 IN | TEMPERATURE: 97.8 F | HEART RATE: 69 BPM | SYSTOLIC BLOOD PRESSURE: 148 MMHG

## 2022-02-16 DIAGNOSIS — I10 ESSENTIAL HYPERTENSION: ICD-10-CM

## 2022-02-16 DIAGNOSIS — E78.49 OTHER HYPERLIPIDEMIA: ICD-10-CM

## 2022-02-16 DIAGNOSIS — E03.8 OTHER SPECIFIED HYPOTHYROIDISM: ICD-10-CM

## 2022-02-16 DIAGNOSIS — Z00.00 MEDICARE ANNUAL WELLNESS VISIT, SUBSEQUENT: ICD-10-CM

## 2022-02-16 DIAGNOSIS — I73.9 PAD (PERIPHERAL ARTERY DISEASE) (HCC): Primary | ICD-10-CM

## 2022-02-16 PROCEDURE — G0439 PPPS, SUBSEQ VISIT: HCPCS | Performed by: INTERNAL MEDICINE

## 2022-02-16 PROCEDURE — 1123F ACP DISCUSS/DSCN MKR DOCD: CPT | Performed by: INTERNAL MEDICINE

## 2022-02-16 PROCEDURE — G8484 FLU IMMUNIZE NO ADMIN: HCPCS | Performed by: INTERNAL MEDICINE

## 2022-02-16 PROCEDURE — 4040F PNEUMOC VAC/ADMIN/RCVD: CPT | Performed by: INTERNAL MEDICINE

## 2022-02-16 RX ORDER — DENOSUMAB 60 MG/ML
INJECTION SUBCUTANEOUS
COMMUNITY
Start: 2022-02-14 | End: 2022-09-13

## 2022-02-16 ASSESSMENT — PATIENT HEALTH QUESTIONNAIRE - PHQ9
2. FEELING DOWN, DEPRESSED OR HOPELESS: 0
SUM OF ALL RESPONSES TO PHQ QUESTIONS 1-9: 0
SUM OF ALL RESPONSES TO PHQ QUESTIONS 1-9: 0
SUM OF ALL RESPONSES TO PHQ9 QUESTIONS 1 & 2: 0
SUM OF ALL RESPONSES TO PHQ QUESTIONS 1-9: 0
1. LITTLE INTEREST OR PLEASURE IN DOING THINGS: 0
SUM OF ALL RESPONSES TO PHQ QUESTIONS 1-9: 0

## 2022-02-16 ASSESSMENT — LIFESTYLE VARIABLES: HOW OFTEN DO YOU HAVE A DRINK CONTAINING ALCOHOL: NEVER

## 2022-02-16 NOTE — PROGRESS NOTES
Medicare Annual Wellness Visit    Cezar Birmingham is here for Medicare Oma was seen today for medicare awv. Diagnoses and all orders for this visit:    PAD (peripheral artery disease) ([facility] [address]  -     US DUP LOWER EXTREMITY RIGHT ARTERIES; Future  -     US DUP LOWER EXTREMITY LEFT ARTERIES; Future    Essential hypertension  -     CBC with Auto Differential; Future  -     Comprehensive Metabolic Panel, Fasting; Future  -     Urinalysis with Microscopic; Future    Other specified hypothyroidism  -     TSH; Future    Other hyperlipidemia  -     Comprehensive Metabolic Panel, Fasting; Future  -     Lipid, Fasting; Future    Medicare annual wellness visit, subsequent         Recommendations for Preventive Services Due: see orders and patient instructions/AVS.  Recommended screening schedule for the next 5-10 years is provided to the patient in written form: see Patient Instructions/AVS.     Return in 1 year (on 2/16/2023) for Medicare Annual Wellness Visit in 1 year. Reviewed and updated this visit by clinical staff:  Tobacco  Allergies  Meds  Problems  Med Hx  Surg Hx  Soc Hx  Fam Hx        Subjective       Patient's complete Health Risk Assessment and screening values have been reviewed and are found in Flowsheets. The following problems were reviewed today and where indicated follow up appointments were made and/or referrals ordered.     Positive Risk Factor Screenings with Interventions:                Safety:  Do you have working smoke detectors?: Yes  Do you have any tripping hazards - loose or unsecured carpets or rugs?: (!) Yes  Do you have any tripping hazards - clutter in doorways, halls, or stairs?: No  Do you have either shower bars, grab bars, non-slip mats or non-slip surfaces in your shower or bathtub?: Yes  Do all of your stairways have a railing or banister?: Yes  Do you always fasten your seatbelt when you are in a car?: Yes    Safety Interventions:  · Home safety tips provided        Objective               Allergies   Allergen Reactions    Ciprofloxacin Hcl      Made me sick     Cleocin [Clindamycin Hcl] Hives     BRIGHT RED RASH    Lisinopril Other (See Comments)     Fever chills    Pcn [Penicillins]     Statins Other (See Comments)     Extreme muscle fatigue    Penciclovir Rash     Prior to Visit Medications    Medication Sig Taking?  Authorizing Provider   levothyroxine (SYNTHROID) 50 MCG tablet Take 1 tablet by mouth Daily Yes Maribeth Sousa MD   cloNIDine (CATAPRES) 0.1 MG tablet Take 1 tablet by mouth 2 times daily Yes Maribeth Sousa MD   triamterene-hydroCHLOROthiazide (MAXZIDE-25) 37.5-25 MG per tablet TAKE 1 [place] Yes Maribeth Sousa MD   aspirin 325 MG tablet Take 325 mg by mouth daily  Yes Historical Provider, MD   predniSONE (DELTASONE) 1 MG tablet Take 4 mg by mouth daily  Yes Historical Provider, MD   losartan (COZAAR) 50 MG tablet Take 1 tablet by mouth daily Yes Samuel Billy MD   Cholecalciferol (VITAMIN D3) 10 MCG (400 UNIT) CAPS Take 1 capsule by mouth daily Yes Historical Provider, MD   vitamin C (ASCORBIC ACID) 500 MG tablet Take 500 mg by mouth daily Yes Historical Provider, MD   coenzyme Q10 100 MG CAPS capsule Take 100 mg by mouth daily  Yes Historical Provider, MD   B Complex Vitamins (VITAMIN B COMPLEX PO)  Yes Historical Provider, MD   CALCIUM PO Take by mouth daily Yes Historical Provider, MD   MAGNESIUM PO Take by mouth 2 times daily Yes Historical Provider, MD   Biotin 59783 MCG TBDP Take by mouth Yes Historical Provider, MD   Methylsulfonylmethane (MSM) 1000 MG TABS Take 1 tablet by mouth daily  Yes Historical Provider, MD   FOLIC ACID PO Take 1 tablet by mouth daily  Yes Historical Provider, MD   PROLIA 60 MG/ML SOSY SC injection   Historical Provider, MD   Naproxen Sodium 220 MG CAPS Take by mouth daily as needed   Patient not taking: Reported on 2/16/2022  Historical Provider, MD Pena (Including outside providers/suppliers regularly involved in providing care):   Patient Care Team:  Boby Ashby MD as PCP - Benita Workman MD as PCP - [facility] Empaneled Provider

## 2022-02-16 NOTE — PATIENT INSTRUCTIONS
Personalized Preventive Plan for Morrison Holter - 2/16/2022  Medicare offers a range of preventive health benefits. Some of the tests and screenings are paid in full while other may be subject to a deductible, co-insurance, and/or copay. Some of these benefits include a comprehensive review of your medical history including lifestyle, illnesses that may run in your family, and various assessments and screenings as appropriate. After reviewing your medical record and screening and assessments performed today your provider may have ordered immunizations, labs, imaging, and/or referrals for you. A list of these orders (if applicable) as well as your Preventive Care list are included within your After Visit Summary for your review. Other Preventive Recommendations:    · A preventive eye exam performed by an eye specialist is recommended every 1-2 years to screen for glaucoma; cataracts, macular degeneration, and other eye disorders. · A preventive dental visit is recommended every 6 months. · Try to get at least 150 minutes of exercise per week or 10,000 steps per day on a pedometer . · Order or download the FREE \"Exercise & Physical Activity: Your Everyday Guide\" from [employer]. Call 5-782.487.9322 or search [employer] online. · You need 3388-3479 mg of calcium and 9587-0394 IU of vitamin D per day. It is possible to meet your calcium requirement with diet alone, but a vitamin D supplement is usually necessary to meet this goal.  · When exposed to the sun, use a sunscreen that protects against both UVA and UVB radiation with an SPF of 30 or greater. Reapply every 2 to 3 hours or after sweating, drying off with a towel, or swimming. · Always wear a seat belt when traveling in a car. Always wear a helmet when riding a bicycle or motorcycle.

## 2022-03-07 ENCOUNTER — HOSPITAL ENCOUNTER (OUTPATIENT)
Dept: MAMMOGRAPHY | Age: 87
Discharge: HOME | End: 2022-03-09
Payer: MEDICARE

## 2022-03-07 VITALS — BODY MASS INDEX: 25.4 KG/M2 | WEIGHT: 138 LBS | HEIGHT: 62 IN

## 2022-03-07 DIAGNOSIS — Z12.31 BREAST CANCER SCREENING BY MAMMOGRAM: ICD-10-CM

## 2022-03-07 PROCEDURE — 77067 SCR MAMMO BI INCL CAD: CPT

## 2022-05-10 DIAGNOSIS — E03.8 OTHER SPECIFIED HYPOTHYROIDISM: ICD-10-CM

## 2022-05-10 DIAGNOSIS — I10 ESSENTIAL HYPERTENSION: ICD-10-CM

## 2022-05-10 DIAGNOSIS — E78.49 OTHER HYPERLIPIDEMIA: ICD-10-CM

## 2022-05-10 LAB
ALBUMIN SERPL-MCNC: 4.4 G/DL (ref 3.5–5.2)
ALP BLD-CCNC: 131 U/L (ref 35–104)
ALT SERPL-CCNC: 10 U/L (ref 0–32)
ANION GAP SERPL CALCULATED.3IONS-SCNC: 16 MMOL/L (ref 7–16)
AST SERPL-CCNC: 24 U/L (ref 0–31)
BACTERIA: ABNORMAL /HPF
BASOPHILS ABSOLUTE: 0.03 E9/L (ref 0–0.2)
BASOPHILS RELATIVE PERCENT: 0.3 % (ref 0–2)
BILIRUB SERPL-MCNC: 0.3 MG/DL (ref 0–1.2)
BILIRUBIN URINE: NEGATIVE
BLOOD, URINE: NEGATIVE
BUN BLDV-MCNC: 37 MG/DL (ref 6–23)
CALCIUM SERPL-MCNC: 9.8 MG/DL (ref 8.6–10.2)
CHLORIDE BLD-SCNC: 102 MMOL/L (ref 98–107)
CHOLESTEROL, FASTING: 237 MG/DL (ref 0–199)
CLARITY: ABNORMAL
CO2: 22 MMOL/L (ref 22–29)
COLOR: YELLOW
CREAT SERPL-MCNC: 1.3 MG/DL (ref 0.5–1)
EOSINOPHILS ABSOLUTE: 0.35 E9/L (ref 0.05–0.5)
EOSINOPHILS RELATIVE PERCENT: 3.9 % (ref 0–6)
EPITHELIAL CELLS, UA: ABNORMAL /HPF
GFR AFRICAN AMERICAN: 47
GFR NON-AFRICAN AMERICAN: 39 ML/MIN/1.73
GLUCOSE FASTING: 96 MG/DL (ref 74–99)
GLUCOSE URINE: NEGATIVE MG/DL
HCT VFR BLD CALC: 44.9 % (ref 34–48)
HDLC SERPL-MCNC: 49 MG/DL
HEMOGLOBIN: 14.2 G/DL (ref 11.5–15.5)
IMMATURE GRANULOCYTES #: 0.04 E9/L
IMMATURE GRANULOCYTES %: 0.4 % (ref 0–5)
KETONES, URINE: NEGATIVE MG/DL
LDL CHOLESTEROL CALCULATED: 166 MG/DL (ref 0–99)
LEUKOCYTE ESTERASE, URINE: ABNORMAL
LYMPHOCYTES ABSOLUTE: 3.48 E9/L (ref 1.5–4)
LYMPHOCYTES RELATIVE PERCENT: 38.3 % (ref 20–42)
MCH RBC QN AUTO: 30.5 PG (ref 26–35)
MCHC RBC AUTO-ENTMCNC: 31.6 % (ref 32–34.5)
MCV RBC AUTO: 96.4 FL (ref 80–99.9)
MONOCYTES ABSOLUTE: 0.85 E9/L (ref 0.1–0.95)
MONOCYTES RELATIVE PERCENT: 9.4 % (ref 2–12)
NEUTROPHILS ABSOLUTE: 4.34 E9/L (ref 1.8–7.3)
NEUTROPHILS RELATIVE PERCENT: 47.7 % (ref 43–80)
NITRITE, URINE: NEGATIVE
PDW BLD-RTO: 14.3 FL (ref 11.5–15)
PH UA: 6 (ref 5–9)
PLATELET # BLD: 290 E9/L (ref 130–450)
PMV BLD AUTO: 10 FL (ref 7–12)
POTASSIUM SERPL-SCNC: 4.7 MMOL/L (ref 3.5–5)
PROTEIN UA: NEGATIVE MG/DL
RBC # BLD: 4.66 E12/L (ref 3.5–5.5)
RBC UA: ABNORMAL /HPF (ref 0–2)
SODIUM BLD-SCNC: 140 MMOL/L (ref 132–146)
SPECIFIC GRAVITY UA: 1.01 (ref 1–1.03)
TOTAL PROTEIN: 7.7 G/DL (ref 6.4–8.3)
TRIGLYCERIDE, FASTING: 108 MG/DL (ref 0–149)
TSH SERPL DL<=0.05 MIU/L-ACNC: 3.25 UIU/ML (ref 0.27–4.2)
UROBILINOGEN, URINE: 0.2 E.U./DL
VLDLC SERPL CALC-MCNC: 22 MG/DL
WBC # BLD: 9.1 E9/L (ref 4.5–11.5)
WBC UA: ABNORMAL /HPF (ref 0–5)

## 2022-05-19 ENCOUNTER — OFFICE VISIT (OUTPATIENT)
Dept: FAMILY MEDICINE CLINIC | Age: 87
End: 2022-05-19
Payer: MEDICARE

## 2022-05-19 VITALS
WEIGHT: 143 LBS | HEART RATE: 56 BPM | BODY MASS INDEX: 26.31 KG/M2 | OXYGEN SATURATION: 98 % | SYSTOLIC BLOOD PRESSURE: 138 MMHG | TEMPERATURE: 97.6 F | HEIGHT: 62 IN | DIASTOLIC BLOOD PRESSURE: 64 MMHG

## 2022-05-19 DIAGNOSIS — N18.32 STAGE 3B CHRONIC KIDNEY DISEASE (HCC): ICD-10-CM

## 2022-05-19 DIAGNOSIS — E03.8 OTHER SPECIFIED HYPOTHYROIDISM: ICD-10-CM

## 2022-05-19 DIAGNOSIS — I10 ESSENTIAL HYPERTENSION: Primary | ICD-10-CM

## 2022-05-19 DIAGNOSIS — E78.49 OTHER HYPERLIPIDEMIA: ICD-10-CM

## 2022-05-19 DIAGNOSIS — Z86.73 HISTORY OF TIA (TRANSIENT ISCHEMIC ATTACK): ICD-10-CM

## 2022-05-19 DIAGNOSIS — M35.3 PMR (POLYMYALGIA RHEUMATICA) (HCC): ICD-10-CM

## 2022-05-19 PROCEDURE — 99214 OFFICE O/P EST MOD 30 MIN: CPT | Performed by: INTERNAL MEDICINE

## 2022-05-19 PROCEDURE — 4040F PNEUMOC VAC/ADMIN/RCVD: CPT | Performed by: INTERNAL MEDICINE

## 2022-05-19 PROCEDURE — 1036F TOBACCO NON-USER: CPT | Performed by: INTERNAL MEDICINE

## 2022-05-19 PROCEDURE — 1090F PRES/ABSN URINE INCON ASSESS: CPT | Performed by: INTERNAL MEDICINE

## 2022-05-19 PROCEDURE — G8399 PT W/DXA RESULTS DOCUMENT: HCPCS | Performed by: INTERNAL MEDICINE

## 2022-05-19 PROCEDURE — 1123F ACP DISCUSS/DSCN MKR DOCD: CPT | Performed by: INTERNAL MEDICINE

## 2022-05-19 PROCEDURE — G8417 CALC BMI ABV UP PARAM F/U: HCPCS | Performed by: INTERNAL MEDICINE

## 2022-05-19 PROCEDURE — G8427 DOCREV CUR MEDS BY ELIG CLIN: HCPCS | Performed by: INTERNAL MEDICINE

## 2022-05-19 RX ORDER — ALENDRONATE SODIUM 70 MG/1
TABLET ORAL
COMMUNITY
Start: 2022-04-18

## 2022-05-19 RX ORDER — LEVOTHYROXINE SODIUM 0.05 MG/1
50 TABLET ORAL DAILY
Qty: 90 TABLET | Refills: 1 | Status: SHIPPED | OUTPATIENT
Start: 2022-05-19 | End: 2022-08-17

## 2022-05-19 RX ORDER — TRIAMTERENE AND HYDROCHLOROTHIAZIDE 37.5; 25 MG/1; MG/1
TABLET ORAL
Qty: 90 TABLET | Refills: 1 | Status: SHIPPED | OUTPATIENT
Start: 2022-05-19

## 2022-05-19 RX ORDER — CLONIDINE HYDROCHLORIDE 0.1 MG/1
0.1 TABLET ORAL 2 TIMES DAILY
Qty: 180 TABLET | Refills: 1 | Status: SHIPPED | OUTPATIENT
Start: 2022-05-19

## 2022-05-19 RX ORDER — LOSARTAN POTASSIUM 50 MG/1
50 TABLET ORAL DAILY
Qty: 90 TABLET | Refills: 1 | Status: SHIPPED | OUTPATIENT
Start: 2022-05-19

## 2022-05-19 SDOH — ECONOMIC STABILITY: FOOD INSECURITY: WITHIN THE PAST 12 MONTHS, THE FOOD YOU BOUGHT JUST DIDN'T LAST AND YOU DIDN'T HAVE MONEY TO GET MORE.: NEVER TRUE

## 2022-05-19 SDOH — ECONOMIC STABILITY: FOOD INSECURITY: WITHIN THE PAST 12 MONTHS, YOU WORRIED THAT YOUR FOOD WOULD RUN OUT BEFORE YOU GOT MONEY TO BUY MORE.: NEVER TRUE

## 2022-05-19 ASSESSMENT — ENCOUNTER SYMPTOMS
BLOOD IN STOOL: 0
EYE DISCHARGE: 0
SHORTNESS OF BREATH: 0
EYE PAIN: 0
SINUS PAIN: 0
NAUSEA: 0
ABDOMINAL PAIN: 0
SORE THROAT: 0

## 2022-05-19 ASSESSMENT — SOCIAL DETERMINANTS OF HEALTH (SDOH): HOW HARD IS IT FOR YOU TO PAY FOR THE VERY BASICS LIKE FOOD, HOUSING, MEDICAL CARE, AND HEATING?: NOT VERY HARD

## 2022-05-19 NOTE — PROGRESS NOTES
Chief Complaint   Patient presents with    Hypertension    Discuss Labs     Review labs from 05/10/2022 / Results of ultrasound    Health Maintenance     Shingles vaccine    Medication Refill       HPI:  Patient is here for follow-up    Feeling okay    Had recent labs        Allergy and Medications are reviewed and updated. Past Medical History, Surgical History, and Family History has been reviewed and updated. Review of Systems:  Review of Systems   Constitutional: Negative for chills and fever. HENT: Negative for congestion, sinus pain and sore throat. Eyes: Negative for pain and discharge. Respiratory: Negative for shortness of breath (No new SOb). Cardiovascular: Negative for chest pain. Gastrointestinal: Negative for abdominal pain, blood in stool and nausea. Genitourinary: Negative for flank pain and frequency. Musculoskeletal: Negative for neck pain. Hematological: Does not bruise/bleed easily. Psychiatric/Behavioral: Negative for suicidal ideas. Vitals:    05/19/22 1139   BP: 138/64   Position: Sitting   Pulse: 56   Temp: 97.6 °F (36.4 °C)   TempSrc: Temporal   SpO2: 98%   Weight: 143 lb (64.9 kg)   Height: 5' 2\" (1.575 m)       Physical Exam  Vitals reviewed. Constitutional:       Appearance: She is well-developed. HENT:      Head: Normocephalic and atraumatic. Mouth/Throat:      Pharynx: No oropharyngeal exudate. Eyes:      Conjunctiva/sclera: Conjunctivae normal.      Pupils: Pupils are equal, round, and reactive to light. Neck:      Vascular: No JVD. Cardiovascular:      Rate and Rhythm: Normal rate and regular rhythm. Pulmonary:      Effort: Pulmonary effort is normal.      Breath sounds: Normal breath sounds. No rales. Abdominal:      General: Bowel sounds are normal.      Palpations: Abdomen is soft. Musculoskeletal:         General: Normal range of motion. Lymphadenopathy:      Cervical: No cervical adenopathy.    Skin:     General: Skin is warm and dry. Neurological:      Mental Status: She is alert and oriented to person, place, and time.    Psychiatric:         Behavior: Behavior normal.          Labs :    Lab Results   Component Value Date    WBC 9.1 05/10/2022    HGB 14.2 05/10/2022    HCT 44.9 05/10/2022     05/10/2022    CHOL 200 (H) 01/26/2018    TRIG 170 (H) 01/26/2018    HDL 49 05/10/2022    ALT 10 05/10/2022    AST 24 05/10/2022     05/10/2022    K 4.7 05/10/2022     05/10/2022    CREATININE 1.3 (H) 05/10/2022    BUN 37 (H) 05/10/2022    CO2 22 05/10/2022    TSH 3.250 05/10/2022    INR 0.9 08/17/2020    GLUF 96 05/10/2022    LABA1C 5.8 07/21/2015     Lab Results   Component Value Date    COLORU Yellow 05/10/2022    NITRU Negative 05/10/2022    GLUCOSEU Negative 05/10/2022    GLUCOSEU NEGATIVE 12/07/2011    KETUA Negative 05/10/2022    UROBILINOGEN 0.2 05/10/2022    BILIRUBINUR Negative 05/10/2022    [address] [place] [address] NEGATIVE 12/07/2011             ASSESSMENT     Patient Active Problem List    Diagnosis Date Noted    Anemia 11/19/2021    Disorder of carotid artery ([facility] [address] 11/19/2021    History of total knee replacement 11/19/2021    Impaired fasting glucose 11/19/2021    Inadequate pain control 11/19/2021    Osteoarthritis of multiple joints 11/19/2021    Actinic keratosis 07/20/2021    Changes in skin texture 07/20/2021    Inflamed seborrheic keratosis 07/20/2021    Lipoma of skin and subcutaneous tissue (excluding face) 07/20/2021    C. difficile colitis 02/11/2019    Other specified hypothyroidism 05/31/2018    Essential hypertension 02/21/2018    Other hyperlipidemia 02/21/2018     Overview Note:     Can not tolerate Statin      Myalgia 02/21/2018    PMR (polymyalgia rheumatica) ([facility] [address] 02/21/2018    Arthropathy of cervical facet joint 02/21/2018    Arthralgia of right hip 03/07/2016    Cerebral infarction [facility])      Overview Note:     Replacing Inactive Diagnoses      History of TIA (transient ischemic attack) 07/20/2015    Right cataract 02/04/2014    Left cataract 08/27/2013    Sprain of acromioclavicular ligament 06/04/2012     Overview Note:     replace inactive diagnosis      Shoulder pain 06/04/2012    Trochanteric bursitis 11/01/2011    Hip pain 11/01/2011        Diagnosis:     ICD-10-CM    1. Essential hypertension  I10 triamterene-hydroCHLOROthiazide (MAXZIDE-25) 37.5-25 MG per tablet     losartan (COZAAR) 50 MG tablet     cloNIDine (CATAPRES) 0.1 MG tablet   2. Other specified hypothyroidism  E03.8 levothyroxine (SYNTHROID) 50 MCG tablet     TSH   3. Other hyperlipidemia  E78.49 Lipid, Fasting   4. Stage 3b chronic kidney disease (HCC)  N18.32 Comprehensive Metabolic Panel, Fasting   5. PMR (polymyalgia rheumatica) ([facility])  M35.3    6. History of TIA (transient ischemic attack)  Z86.73        PLAN:      Pt is feeling ok    Following with Her Rheumatology and ortho     Recent labs reviewed    Vascular study- No PAD    Mammo- N    CBC  CMP  LIPID  TSH  UA   Reviewed  Renal function - worse    Discussed  with her  Avoid NSAIDS    Rpt labs in 3 months  At present she does not want to be ref to Nephrologist         Patient Instructions   The medication list included in this document is our record of what you are currently taking, including any changes that were made at today's visit.  If you find any differences when compared to your medications at home, or have any questions that were not answered at your visit, please contact the office. Advise to have ( Fasting) lab test prior to next visit. Return in about 3 months (around 8/19/2022).

## 2022-08-11 ENCOUNTER — APPOINTMENT (OUTPATIENT)
Dept: GENERAL RADIOLOGY | Age: 87
End: 2022-08-11
Payer: MEDICARE

## 2022-08-11 ENCOUNTER — HOSPITAL ENCOUNTER (EMERGENCY)
Age: 87
Discharge: HOME | End: 2022-08-11
Payer: MEDICARE

## 2022-08-11 VITALS
DIASTOLIC BLOOD PRESSURE: 69 MMHG | WEIGHT: 135 LBS | HEART RATE: 52 BPM | SYSTOLIC BLOOD PRESSURE: 164 MMHG | BODY MASS INDEX: 24.69 KG/M2 | TEMPERATURE: 97.7 F | OXYGEN SATURATION: 97 % | RESPIRATION RATE: 16 BRPM

## 2022-08-11 DIAGNOSIS — S93.601A SPRAIN OF RIGHT FOOT, INITIAL ENCOUNTER: Primary | ICD-10-CM

## 2022-08-11 PROCEDURE — 73630 X-RAY EXAM OF FOOT: CPT

## 2022-08-11 PROCEDURE — 73610 X-RAY EXAM OF ANKLE: CPT

## 2022-08-11 PROCEDURE — 99212 OFFICE O/P EST SF 10 MIN: CPT

## 2022-08-11 NOTE — ED PROVIDER NOTES
Department of Emergency Medicine  [address] [place] [address]  Provider Note  Admit Date/Time: 8/11/2022 11:46 AM  Room: 04/04  MRN: [id number]  Chief Complaint: Foot Pain (Left foot pain for months, wants an xray)       History of Present Illness   Source of history provided by: Patient. History/Exam Limitations: None. Soren Robins is a 82 y.o. female with a history of TIA as well as polymyalgia rheumatica on chronic low-dose corticosteroids. Reports she was in physical therapy for a patellar fracture approximately 3 months ago and was using an exercise band to do plantar and dorsi flexion exercises of the left ankle when she felt some pain in the midfoot. Has been persistent since. Yesterday, she was ambulating and stepped on the divider between carpet and bare floor with some increasing discomfort in the arch so came in for evaluation. Has been able to weight-bear. Denies any distal paresthesias or weakness. There was no other injury during the event. No head injury or loss of consciousness. ROS    Pertinent positives and negatives are stated within HPI, all other systems reviewed and are negative. Past Surgical History:   Procedure Laterality Date    APPENDECTOMY      CATARACT REMOVAL      left eye 8-    CATARACT REMOVAL WITH IMPLANT Right 2 4 2014    CHOLECYSTECTOMY      HYSTERECTOMY (CERVIX STATUS UNKNOWN)      KNEE ARTHROSCOPY Left 07/27/2017    TONSILLECTOMY      TOTAL KNEE ARTHROPLASTY Left 08/24/2020    Dr Yuniel Haynes [facility]   Social History:  reports that she has never smoked. She has never used smokeless tobacco. She reports that she does not drink alcohol and does not use drugs. Family History: family history is not on file.   Allergies: Ciprofloxacin hcl, Cleocin [clindamycin hcl], Lisinopril, Pcn [penicillins], Statins, and Penciclovir    Physical Exam   Oxygen Saturation Interpretation: Normal.   ED Triage Vitals [08/11/22 1145]   BP Temp Temp src Heart Rate Resp SpO2 Height Weight   (!) 164/69 97.7 °F (36.5 °C) -- 52 16 97 % -- 135 lb (61.2 kg)     Physical Exam   Physical Exam:  Gen.: Vitals noted. No distress. Afebrile. Neck: Supple. No adenopathy. Cardiac: Regular rate rhythm. No murmur. Pulmonary: Equal breath sounds bilaterally. No adventitious breath sounds. Abdomen: Soft, nontender, nonsurgical. Normoactive bowel sounds. Back: Nontender throughout. Lower extremity: Exam of the left foot she has tenderness over the fifth metatarsal some slight local edema. Some tenderness over the lateral malleolus as well as plantar midfoot as well. The skin is intact. There is no erythema or warmth. No evidence of cellulitis. Is neurovascularly intact distally. The ankle is otherwise nontender. The remainder of the extremity is nontender, specifically, nontender over the knee and fibular head. There is no tenderness or asymmetry in the calf. No suggestion of DVT. Lab / Imaging Results   (All laboratory and radiology results have been personally reviewed by myself)  Labs:  No results found for this visit on 08/11/22. Imaging: All Radiology results interpreted by Radiologist unless otherwise noted. XR FOOT LEFT (MIN 3 VIEWS)    (Results Pending)   XR ANKLE LEFT (MIN 3 VIEWS)    (Results Pending)       ED Course / Medical Decision Making   Medications - No data to display       Consult(s):   None    Procedure(s):   None. Differential Diagnosis: Is extensive but includes fracture, dislocation, nonvisualized/occult fracture, tendon/ligament injury, foreign body, soft tissue injury, etc.    MDM:   This is a 82 y.o. female who presents with the above history and exam findings. X-rays show no fracture or dislocation. Will be given a postop shoe. Home-going with podiatry follow-up although she reports she has an orthopedist that she can see as well.     Counseling: I discussed the differential, results and discharge plan with the patient and/or family/friend/caregiver if present. I emphasized the importance of follow-up with the physician I referred them to in the timeframe recommended. I explained reasons for the patient to return to the Emergency Department. Additional verbal discharge instructions were also given and discussed with the patient to supplement those generated by the EMR. We also discussed medications that were prescribed (if any) including common side effects and interactions. The patient was advised to abstain from driving, operating heavy machinery or making significant decisions while taking medications such as opiates and muscle relaxers that may impair this. All questions were addressed. They understand return precautions and discharge instructions. The patient and/or family/friend/caregiver expressed understanding. Assessment      1. Sprain of right foot, initial encounter      Plan   Discharge to home and advised to contact Pita Begum, [facility]  [address] [facility] [phone]      As needed   Patient condition is good    New Medications     New Prescriptions    No medications on file     Electronically signed by PATTI Rothman   DD: 8/11/22  **This report was transcribed using voice recognition software. Every effort was made to ensure accuracy; however, inadvertent computerized transcription errors may be present.   END OF ED PROVIDER NOTE          Renee Cornea [address] [place], [place]  08/11/22 1232

## 2022-09-08 DIAGNOSIS — E78.49 OTHER HYPERLIPIDEMIA: ICD-10-CM

## 2022-09-08 DIAGNOSIS — N18.32 STAGE 3B CHRONIC KIDNEY DISEASE (HCC): ICD-10-CM

## 2022-09-08 DIAGNOSIS — E03.8 OTHER SPECIFIED HYPOTHYROIDISM: ICD-10-CM

## 2022-09-08 LAB
ALBUMIN SERPL-MCNC: 3.9 G/DL (ref 3.5–5.2)
ALP BLD-CCNC: 108 U/L (ref 35–104)
ALT SERPL-CCNC: 12 U/L (ref 0–32)
ANION GAP SERPL CALCULATED.3IONS-SCNC: 9 MMOL/L (ref 7–16)
AST SERPL-CCNC: 21 U/L (ref 0–31)
BILIRUB SERPL-MCNC: 0.3 MG/DL (ref 0–1.2)
BUN BLDV-MCNC: 25 MG/DL (ref 6–23)
CALCIUM SERPL-MCNC: 10.1 MG/DL (ref 8.6–10.2)
CHLORIDE BLD-SCNC: 104 MMOL/L (ref 98–107)
CHOLESTEROL, FASTING: 244 MG/DL (ref 0–199)
CO2: 29 MMOL/L (ref 22–29)
CREAT SERPL-MCNC: 1.2 MG/DL (ref 0.5–1)
GFR AFRICAN AMERICAN: 52
GFR NON-AFRICAN AMERICAN: 43 ML/MIN/1.73
GLUCOSE FASTING: 83 MG/DL (ref 74–99)
HDLC SERPL-MCNC: 43 MG/DL
LDL CHOLESTEROL CALCULATED: 163 MG/DL (ref 0–99)
POTASSIUM SERPL-SCNC: 4.3 MMOL/L (ref 3.5–5)
SODIUM BLD-SCNC: 142 MMOL/L (ref 132–146)
TOTAL PROTEIN: 7.1 G/DL (ref 6.4–8.3)
TRIGLYCERIDE, FASTING: 190 MG/DL (ref 0–149)
TSH SERPL DL<=0.05 MIU/L-ACNC: 3.28 UIU/ML (ref 0.27–4.2)
VLDLC SERPL CALC-MCNC: 38 MG/DL

## 2022-09-13 ENCOUNTER — OFFICE VISIT (OUTPATIENT)
Dept: FAMILY MEDICINE CLINIC | Age: 87
End: 2022-09-13
Payer: MEDICARE

## 2022-09-13 VITALS
DIASTOLIC BLOOD PRESSURE: 66 MMHG | WEIGHT: 140.1 LBS | HEART RATE: 57 BPM | SYSTOLIC BLOOD PRESSURE: 134 MMHG | TEMPERATURE: 97.1 F | BODY MASS INDEX: 25.78 KG/M2 | OXYGEN SATURATION: 97 % | HEIGHT: 62 IN

## 2022-09-13 DIAGNOSIS — Z86.73 HISTORY OF TIA (TRANSIENT ISCHEMIC ATTACK): ICD-10-CM

## 2022-09-13 DIAGNOSIS — N18.32 STAGE 3B CHRONIC KIDNEY DISEASE (HCC): ICD-10-CM

## 2022-09-13 DIAGNOSIS — M35.3 PMR (POLYMYALGIA RHEUMATICA) (HCC): ICD-10-CM

## 2022-09-13 DIAGNOSIS — E03.8 OTHER SPECIFIED HYPOTHYROIDISM: ICD-10-CM

## 2022-09-13 DIAGNOSIS — M81.0 OSTEOPOROSIS, UNSPECIFIED OSTEOPOROSIS TYPE, UNSPECIFIED PATHOLOGICAL FRACTURE PRESENCE: ICD-10-CM

## 2022-09-13 DIAGNOSIS — I10 ESSENTIAL HYPERTENSION: Primary | ICD-10-CM

## 2022-09-13 DIAGNOSIS — T46.6X5A ADVERSE EFFECT OF STATIN: ICD-10-CM

## 2022-09-13 DIAGNOSIS — E78.49 OTHER HYPERLIPIDEMIA: ICD-10-CM

## 2022-09-13 PROCEDURE — G8427 DOCREV CUR MEDS BY ELIG CLIN: HCPCS | Performed by: INTERNAL MEDICINE

## 2022-09-13 PROCEDURE — G8417 CALC BMI ABV UP PARAM F/U: HCPCS | Performed by: INTERNAL MEDICINE

## 2022-09-13 PROCEDURE — 1036F TOBACCO NON-USER: CPT | Performed by: INTERNAL MEDICINE

## 2022-09-13 PROCEDURE — 1090F PRES/ABSN URINE INCON ASSESS: CPT | Performed by: INTERNAL MEDICINE

## 2022-09-13 PROCEDURE — 99214 OFFICE O/P EST MOD 30 MIN: CPT | Performed by: INTERNAL MEDICINE

## 2022-09-13 PROCEDURE — 1123F ACP DISCUSS/DSCN MKR DOCD: CPT | Performed by: INTERNAL MEDICINE

## 2022-09-13 PROCEDURE — G8399 PT W/DXA RESULTS DOCUMENT: HCPCS | Performed by: INTERNAL MEDICINE

## 2022-09-13 ASSESSMENT — ENCOUNTER SYMPTOMS
ABDOMINAL PAIN: 0
SORE THROAT: 0
NAUSEA: 0
EYE DISCHARGE: 0
EYE PAIN: 0
SINUS PAIN: 0
SHORTNESS OF BREATH: 0
BLOOD IN STOOL: 0

## 2022-09-13 ASSESSMENT — LIFESTYLE VARIABLES
HOW MANY STANDARD DRINKS CONTAINING ALCOHOL DO YOU HAVE ON A TYPICAL DAY: PATIENT DOES NOT DRINK
HOW OFTEN DO YOU HAVE A DRINK CONTAINING ALCOHOL: NEVER

## 2022-09-13 ASSESSMENT — PATIENT HEALTH QUESTIONNAIRE - PHQ9
SUM OF ALL RESPONSES TO PHQ QUESTIONS 1-9: 0
2. FEELING DOWN, DEPRESSED OR HOPELESS: 0
SUM OF ALL RESPONSES TO PHQ QUESTIONS 1-9: 0
SUM OF ALL RESPONSES TO PHQ9 QUESTIONS 1 & 2: 0
1. LITTLE INTEREST OR PLEASURE IN DOING THINGS: 0

## 2022-09-13 NOTE — PROGRESS NOTES
Chief Complaint   Patient presents with    Hypertension     Here for a 3 month follow up on hypertension and discuss labs     Discuss Labs     Review labs from 09/08/2022       HPI:  Patient is here for follow-up    Feeling okay    Had Lt knee patellar fracture- treated at [address] and Medications are reviewed and updated. Past Medical History, Surgical History, and Family History has been reviewed and updated. Review of Systems:  Review of Systems   Constitutional:  Negative for chills and fever. HENT:  Negative for congestion, sinus pain and sore throat. Eyes:  Negative for pain and discharge. Respiratory:  Negative for shortness of breath (No new SOb). Cardiovascular:  Negative for chest pain. Gastrointestinal:  Negative for abdominal pain, blood in stool and nausea. Genitourinary:  Negative for flank pain and frequency. Musculoskeletal:  Negative for neck pain. Hematological:  Does not bruise/bleed easily. Psychiatric/Behavioral:  Negative for suicidal ideas. Vitals:    09/13/22 1207   BP: 134/66   Position: Sitting   Pulse: 57   Temp: 97.1 °F (36.2 °C)   TempSrc: Temporal   SpO2: 97%   Weight: 140 lb 1.6 oz (63.5 kg)   Height: 5' 2\" (1.575 m)       Physical Exam  Vitals reviewed. Constitutional:       Appearance: She is well-developed. HENT:      Head: Normocephalic and atraumatic. Nose: Nose normal.   Eyes:      Conjunctiva/sclera: Conjunctivae normal.      Pupils: Pupils are equal, round, and reactive to light. Neck:      Vascular: No JVD. Cardiovascular:      Rate and Rhythm: Normal rate and regular rhythm. Pulmonary:      Effort: Pulmonary effort is normal.      Breath sounds: Normal breath sounds. Abdominal:      General: Bowel sounds are normal.      Palpations: Abdomen is soft. Musculoskeletal:         General: Normal range of motion. Skin:     General: Skin is warm and dry.    Neurological:      Mental Status: She is alert and oriented to person, place, and time.    Psychiatric:         Behavior: Behavior normal.        Labs :    Lab Results   Component Value Date    WBC 9.1 05/10/2022    HGB 14.2 05/10/2022    HCT 44.9 05/10/2022     05/10/2022    CHOL 200 (H) 01/26/2018    TRIG 170 (H) 01/26/2018    HDL 43 09/08/2022    ALT 12 09/08/2022    AST 21 09/08/2022     09/08/2022    K 4.3 09/08/2022     09/08/2022    CREATININE 1.2 (H) 09/08/2022    BUN 25 (H) 09/08/2022    CO2 29 09/08/2022    TSH 3.280 09/08/2022    INR 0.9 08/17/2020    GLUF 83 09/08/2022    LABA1C 5.8 07/21/2015     Lab Results   Component Value Date/Time    COLORU Yellow 05/10/2022 08:19 AM    NITRU Negative 05/10/2022 08:19 AM    GLUCOSEU Negative 05/10/2022 08:19 AM    GLUCOSEU NEGATIVE 12/07/2011 12:00 PM    KETUA Negative 05/10/2022 08:19 AM    UROBILINOGEN 0.2 05/10/2022 08:19 AM    BILIRUBINUR Negative 05/10/2022 08:19 AM    BILIRUBINUR NEGATIVE 12/07/2011 12:00 PM         ASSESSMENT     Patient Active Problem List    Diagnosis Date Noted    Stage 3b chronic kidney disease ([facility] [address] 09/13/2022     Priority: Medium    Osteoporosis 09/13/2022     Priority: Medium     Assessment & Plan Note:      Fosamax  weekly      Adverse effect of statin 09/13/2022     Priority: Medium    Anemia 11/19/2021    Disorder of carotid artery ([facility] [address] 11/19/2021    History of total knee replacement 11/19/2021    Impaired fasting glucose 11/19/2021    Inadequate pain control 11/19/2021    Osteoarthritis of multiple joints 11/19/2021    Actinic keratosis 07/20/2021    Changes in skin texture 07/20/2021    Inflamed seborrheic keratosis 07/20/2021    Lipoma of skin and subcutaneous tissue (excluding face) 07/20/2021    C. difficile colitis 02/11/2019    Other specified hypothyroidism 05/31/2018     Assessment & Plan Note:      Well-controlled, continue current medications     LT4 50 mcg daily      Essential hypertension 02/21/2018     Assessment & Plan Note:      Well-controlled, continue current medications     Losartan 50 mg daily  Clonidine 0.1 mg bid  Maxzide 25 daily       Other hyperlipidemia 02/21/2018     Overview Note:     Can not tolerate Statin      Myalgia 02/21/2018    P[facility] 02/21/2018     Assessment & Plan Note:      Prednisone 4 mg daily      Arthropathy of cervical facet joint 02/21/2018    Arthralgia of right hip 03/07/2016    Cerebral infarction [facility])      Overview Note:     Replacing Inactive Diagnoses      History of TIA (transient ischemic attack) 07/20/2015    Right cataract 02/04/2014    Left cataract 08/27/2013    Sprain of acromioclavicular ligament 06/04/2012     Overview Note:     replace inactive diagnosis      Shoulder pain 06/04/2012    Trochanteric bursitis 11/01/2011    Hip pain 11/01/2011        Diagnosis:   1. Essential hypertension  Assessment & Plan:   Well-controlled, continue current medications     Losartan 50 mg daily  Clonidine 0.1 mg bid  Maxzide 25 daily   2. Other hyperlipidemia  3. Other specified hypothyroidism  Assessment & Plan:   Well-controlled, continue current medications     LT4 50 mcg daily  4. Stage 3b chronic kidney disease ([facility] [address]  5. History of TIA (transient ischemic attack)  6. Adverse effect of statin  7. PMR (polymyalgia rheumatica) ([facility])  Assessment & Plan:   Prednisone 4 mg daily  8. Osteoporosis, unspecified osteoporosis type, unspecified pathological fracture presence  Assessment & Plan:   Fosamax  weekly         PLAN:       Labs reviewed    Can not take Statin    Pt is stable on current medical treatment. Continue current treatment plan    Side effects/Adverse effects/Precautions are reviewed with patient. Low salt, Low Carb diet an low fat diet  Continue medications as advised and take them regularly  Regular exercises as advised    Discussed natural and expected course of this diagnosis and need to alert me if symptoms do not follow expected course, or if any worse.     Smoking cessation if

## 2022-11-29 ENCOUNTER — TELEPHONE (OUTPATIENT)
Dept: FAMILY MEDICINE CLINIC | Age: 87
End: 2022-11-29

## 2022-11-29 NOTE — TELEPHONE ENCOUNTER
----- Message from Katia Hansen sent at 11/29/2022 12:08 PM EST -----  Subject: Appointment Request    Reason for Call: Established Patient Appointment needed: Routine Pre-Op    QUESTIONS    Reason for appointment request? No appointments available during search     Additional Information for Provider? pt needs a pre op physical for a   hammer toe surgery 12/9/22, with Dr Orrie Lanes at [facility]) outpatient,   EKG not needed, screened green  ---------------------------------------------------------------------------  --------------  [id number]  [phone]; OK to leave message on voicemail  ---------------------------------------------------------------------------  --------------  SCRIPT ANSWERS  COVID Screen: Maxi Alves

## 2022-12-02 ENCOUNTER — OFFICE VISIT (OUTPATIENT)
Dept: FAMILY MEDICINE CLINIC | Age: 87
End: 2022-12-02
Payer: MEDICARE

## 2022-12-02 VITALS
WEIGHT: 141 LBS | BODY MASS INDEX: 25.79 KG/M2 | DIASTOLIC BLOOD PRESSURE: 60 MMHG | TEMPERATURE: 97 F | OXYGEN SATURATION: 96 % | SYSTOLIC BLOOD PRESSURE: 122 MMHG | HEART RATE: 57 BPM

## 2022-12-02 DIAGNOSIS — I10 ESSENTIAL HYPERTENSION: ICD-10-CM

## 2022-12-02 DIAGNOSIS — Z01.818 PREOP EXAMINATION: Primary | ICD-10-CM

## 2022-12-02 DIAGNOSIS — N18.32 STAGE 3B CHRONIC KIDNEY DISEASE (HCC): ICD-10-CM

## 2022-12-02 PROCEDURE — G8484 FLU IMMUNIZE NO ADMIN: HCPCS | Performed by: FAMILY MEDICINE

## 2022-12-02 PROCEDURE — 3074F SYST BP LT 130 MM HG: CPT | Performed by: FAMILY MEDICINE

## 2022-12-02 PROCEDURE — G8399 PT W/DXA RESULTS DOCUMENT: HCPCS | Performed by: FAMILY MEDICINE

## 2022-12-02 PROCEDURE — 1036F TOBACCO NON-USER: CPT | Performed by: FAMILY MEDICINE

## 2022-12-02 PROCEDURE — 1090F PRES/ABSN URINE INCON ASSESS: CPT | Performed by: FAMILY MEDICINE

## 2022-12-02 PROCEDURE — G8417 CALC BMI ABV UP PARAM F/U: HCPCS | Performed by: FAMILY MEDICINE

## 2022-12-02 PROCEDURE — 1123F ACP DISCUSS/DSCN MKR DOCD: CPT | Performed by: FAMILY MEDICINE

## 2022-12-02 PROCEDURE — G8427 DOCREV CUR MEDS BY ELIG CLIN: HCPCS | Performed by: FAMILY MEDICINE

## 2022-12-02 PROCEDURE — 99204 OFFICE O/P NEW MOD 45 MIN: CPT | Performed by: FAMILY MEDICINE

## 2022-12-02 PROCEDURE — 3078F DIAST BP <80 MM HG: CPT | Performed by: FAMILY MEDICINE

## 2022-12-02 ASSESSMENT — ENCOUNTER SYMPTOMS
EYES NEGATIVE: 1
RESPIRATORY NEGATIVE: 1
ALLERGIC/IMMUNOLOGIC NEGATIVE: 1
GASTROINTESTINAL NEGATIVE: 1

## 2022-12-02 NOTE — PATIENT INSTRUCTIONS
Continue present treatment  You have an average risk for the surgery  Return to clinic at her regular appointment

## 2022-12-02 NOTE — PROGRESS NOTES
OFFICE PROGRESS NOTE      SUBJECTIVE:        Patient ID:   Livan Ayoub is a 82 y.o. female who presents for   Chief Complaint   Patient presents with    Pre-op Exam     Here for surgical clearance for Lt foot surgery. Ekg clearance already sent by Dr Nadeen Scheuermann. HPI:   Patient here for the medical clearance for the foot surgery  Patient states that she got a clearance from the cardiology  No specific issues at this time  Patient blood pressure is stable  Patient is walking without difficulty  Lab work and medications reviewed      Prior to Visit Medications    Medication Sig Taking?  Authorizing Provider   alendronate (FOSAMAX) 70 MG tablet 70 mg One q week Yes Historical Provider, MD   triamterene-hydroCHLOROthiazide (MAXZIDE-25) 37.5-25 MG per tablet TAKE 1 Arelis Nanda Yes Elise Mcardle, MD   losartan (COZAAR) 50 MG tablet Take 1 tablet by mouth daily Yes Elise Mcardle, MD   levothyroxine (SYNTHROID) 50 MCG tablet Take 1 tablet by mouth Daily Yes Elise Mcardle, MD   cloNIDine (CATAPRES) 0.1 MG tablet Take 1 tablet by mouth 2 times daily Yes Elise Mcardle, MD   aspirin 81 MG EC tablet Take 81 mg by mouth daily  Yes Historical Provider, MD   predniSONE (DELTASONE) 1 MG tablet Take 4 mg by mouth daily  Yes Historical Provider, MD   Cholecalciferol (VITAMIN D3) 10 MCG (400 UNIT) CAPS Take 1 capsule by mouth daily Yes Historical Provider, MD   vitamin C (ASCORBIC ACID) 500 MG tablet Take 500 mg by mouth daily Yes Historical Provider, MD   B Complex Vitamins (VITAMIN B COMPLEX PO)  Yes Historical Provider, MD   CALCIUM PO Take by mouth daily Yes Historical Provider, MD   MAGNESIUM PO Take by mouth 2 times daily Yes Historical Provider, MD   Biotin 64949 MCG TBDP Take by mouth Yes Historical Provider, MD   Methylsulfonylmethane (MSM) 1000 MG TABS Take 1 tablet by mouth daily  Yes Historical Provider, MD   FOLIC ACID PO Take 1 tablet by mouth daily  Yes Historical Provider, MD      Social History     Socioeconomic History    Marital status:      Spouse name: None    Number of children: None    Years of education: None    Highest education level: None   Tobacco Use    Smoking status: Never    Smokeless tobacco: Never   Vaping Use    Vaping Use: Never used   Substance and Sexual Activity    Alcohol use: No     Alcohol/week: 0.0 standard drinks    Drug use: No    Sexual activity: Yes     Partners: Male     Social Determinants of Health     Financial Resource Strain: Low Risk     Difficulty of Paying Living Expenses: Not very hard   Food Insecurity: No Food Insecurity    Worried About Running Out of Food in the Last Year: Never true    Ran Out of Food in the Last Year: Never true   Physical Activity: Sufficiently Active    Days of Exercise per Week: 6 days    Minutes of Exercise per Session: 60 min       I have reviewed Maribeth's allergies, medications, problem list, medical, social and family history and have updated as needed in the electronic medical record  Review Of Systems:    Review of Systems   Constitutional: Negative. HENT: Negative. Eyes: Negative. Respiratory: Negative. Cardiovascular: Negative. Gastrointestinal: Negative. Endocrine: Negative. Musculoskeletal: Negative. Skin: Negative. Allergic/Immunologic: Negative. Neurological: Negative. Hematological: Negative. Psychiatric/Behavioral: Negative. OBJECTIVE:     VS:  Wt Readings from Last 3 Encounters:   12/02/22 141 lb (64 kg)   09/13/22 140 lb 1.6 oz (63.5 kg)   08/11/22 135 lb (61.2 kg)     Temp Readings from Last 3 Encounters:   12/02/22 97 °F (36.1 °C) (Infrared)   09/13/22 97.1 °F (36.2 °C) (Temporal)   08/11/22 97.7 °F (36.5 °C)     BP Readings from Last 3 Encounters:   12/02/22 122/60   09/13/22 134/66   08/11/22 (!) 164/69        Physical Exam  Constitutional:       Appearance: She is well-developed.    HENT:      Head: Normocephalic and atraumatic. Eyes:      Conjunctiva/sclera: Conjunctivae normal.      Pupils: Pupils are equal, round, and reactive to light. Cardiovascular:      Rate and Rhythm: Normal rate and regular rhythm. Heart sounds: Normal heart sounds. Pulmonary:      Effort: Pulmonary effort is normal.      Breath sounds: Normal breath sounds. Abdominal:      General: Bowel sounds are normal.      Palpations: Abdomen is soft. Musculoskeletal:         General: Normal range of motion. Cervical back: Normal range of motion and neck supple. Skin:     General: Skin is warm and dry. Neurological:      Mental Status: She is alert and oriented to person, place, and time. Psychiatric:         Thought Content:  Thought content normal.          Labs :    Lab Results   Component Value Date    WBC 9.1 05/10/2022    HGB 14.2 05/10/2022    HCT 44.9 05/10/2022     05/10/2022    CHOL 200 (H) 01/26/2018    TRIG 170 (H) 01/26/2018    HDL 43 09/08/2022    ALT 12 09/08/2022    AST 21 09/08/2022     09/08/2022    K 4.3 09/08/2022     09/08/2022    CREATININE 1.2 (H) 09/08/2022    BUN 25 (H) 09/08/2022    CO2 29 09/08/2022    TSH 3.280 09/08/2022    INR 0.9 08/17/2020    GLUF 83 09/08/2022    LABA1C 5.8 07/21/2015     Lab Results   Component Value Date/Time    COLORU Yellow 05/10/2022 08:19 AM    NITRU Negative 05/10/2022 08:19 AM    GLUCOSEU Negative 05/10/2022 08:19 AM    GLUCOSEU NEGATIVE 12/07/2011 12:00 PM    KETUA Negative 05/10/2022 08:19 AM    UROBILINOGEN 0.2 05/10/2022 08:19 AM    BILIRUBINUR Negative 05/10/2022 08:19 AM    BILIRUBINUR NEGATIVE 12/07/2011 12:00 PM     No results found for: PSA, CEA, , GO0507,       Controlled Substances Monitoring:                                    ASSESSMENT     Patient Active Problem List    Diagnosis Date Noted    Stage 3b chronic kidney disease ([facility] [address] 09/13/2022    Osteoporosis 09/13/2022    Adverse effect of statin 09/13/2022    Anemia 11/19/2021    Disorder of carotid artery ([facility] [address] 11/19/2021    History of total knee replacement 11/19/2021    Impaired fasting glucose 11/19/2021    Inadequate pain control 11/19/2021    Osteoarthritis of multiple joints 11/19/2021    Actinic keratosis 07/20/2021    Changes in skin texture 07/20/2021    Inflamed seborrheic keratosis 07/20/2021    Lipoma of skin and subcutaneous tissue (excluding face) 07/20/2021    C. difficile colitis 02/11/2019    Other specified hypothyroidism 05/31/2018    Essential hypertension 02/21/2018    Other hyperlipidemia 02/21/2018    Myalgia 02/21/2018    PMR (polymyalgia rheumatica) ([facility] [address] 02/21/2018    Arthropathy of cervical facet joint 02/21/2018    Arthralgia of right hip 03/07/2016    Cerebral infarction [facility])     History of TIA (transient ischemic attack) 07/20/2015    Right cataract 02/04/2014    Left cataract 08/27/2013    Sprain of acromioclavicular ligament 06/04/2012    Shoulder pain 06/04/2012    Trochanteric bursitis 11/01/2011    Hip pain 11/01/2011        Diagnosis:     ICD-10-CM    1. Preop examination  Z01.818       2. Essential hypertension  I10       3. Stage 3b chronic kidney disease (HCC)  N18.32           PLAN:   Present treatment  Low-sodium low-fat diet  Average risk for the surgery  Return to clinic earlier if any problems    Patient also got a clearance from the cardiology  Lab work and medication reviewed      Patient Instructions   Continue present treatment  You have an average risk for the surgery  Return to clinic at her regular appointment    Return Regular appointment. Mary Erickson reviewed my findings and recommendations with Hima Pastor.     Electronicallysigned by Tee Cochran MD on 12/2/22 at 11:48 AM EST

## 2022-12-05 DIAGNOSIS — M20.42 HAMMER TOE OF LEFT FOOT: ICD-10-CM

## 2022-12-05 LAB
ANION GAP SERPL CALCULATED.3IONS-SCNC: 14 MMOL/L (ref 7–16)
BUN BLDV-MCNC: 24 MG/DL (ref 6–23)
CALCIUM SERPL-MCNC: 10 MG/DL (ref 8.6–10.2)
CHLORIDE BLD-SCNC: 102 MMOL/L (ref 98–107)
CO2: 26 MMOL/L (ref 22–29)
CREAT SERPL-MCNC: 1.2 MG/DL (ref 0.5–1)
GFR SERPL CREATININE-BSD FRML MDRD: 44 ML/MIN/1.73
GLUCOSE BLD-MCNC: 89 MG/DL (ref 74–99)
POTASSIUM SERPL-SCNC: 4 MMOL/L (ref 3.5–5)
SODIUM BLD-SCNC: 142 MMOL/L (ref 132–146)

## 2022-12-08 ENCOUNTER — ANESTHESIA EVENT (OUTPATIENT)
Dept: OPERATING ROOM | Age: 87
End: 2022-12-08
Payer: MEDICARE

## 2022-12-08 NOTE — ANESTHESIA PRE PROCEDURE
Department of Anesthesiology  Preprocedure Note       Name:  David Walsh   Age:  82 y.o.  :  1935                                          MRN:  [id number]         Date:  2022      Surgeon: Norma Blackwood):  Freddie Lizama DPM    Procedure: Procedure(s):  HAMMERTOE CORRECTIONS 2, 3, 4 TOES LEFT FOOT    Medications prior to admission:   Prior to Admission medications    Medication Sig Start Date End Date Taking?  Authorizing Provider   Calcium-Magnesium-Zinc (JUVE-MAG-ZINC PO) Take by mouth daily   Yes Historical Provider, MD   alendronate (FOSAMAX) 70 MG tablet 70 mg One q week 22   Historical Provider, MD   triamterene-hydroCHLOROthiazide (OBZKVEY-64) 37.5-25 MG per tablet TAKE 1 TABLET EVERY DAY 22   Jaylen Bella MD   losartan (COZAAR) 50 MG tablet Take 1 tablet by mouth daily  Patient taking differently: Take 50 mg by mouth daily (before lunch) 22   Jaylen Bella MD   levothyroxine (SYNTHROID) 50 MCG tablet Take 1 tablet by mouth Daily  Patient taking differently: Take 50 mcg by mouth at bedtime 22  Jaylen Bella MD   cloNIDine (CATAPRES) 0.1 MG tablet Take 1 tablet by mouth 2 times daily 22   Jaylen Bella MD   aspirin 81 MG EC tablet Take 81 mg by mouth daily States Stopped 1 week pre op per Dr Scott Reap instructions    Historical Provider, MD   predniSONE (DELTASONE) 1 MG tablet Take 4 mg by mouth daily  3/27/21   Historical Provider, MD   Cholecalciferol (VITAMIN D3) 10 MCG (400 UNIT) CAPS Take 1 capsule by mouth daily    Historical Provider, MD   vitamin C (ASCORBIC ACID) 500 MG tablet Take 500 mg by mouth daily    Historical Provider, MD   B Complex Vitamins (VITAMIN B COMPLEX PO) Daily 18   Historical Provider, MD   Biotin 24554 MCG TBDP Take by mouth daily    Historical Provider, MD   Methylsulfonylmethane (MSM) 1000 MG TABS Take 1 tablet by mouth daily  18   Historical Provider, MD   FOLIC ACID PO Take 1 tablet by mouth daily Historical Provider, MD       Current medications:    Current Facility-Administered Medications   Medication Dose Route Frequency Provider Last Rate Last Admin    lactated ringers infusion   IntraVENous Continuous Nick Moran  mL/hr at 12/09/22 0802 New Bag at 12/09/22 0802    vancomycin (VANCOCIN) 500 mg in sodium chloride 0.9 % 100 mL IVPB ([id number])  500 mg IntraVENous Once Amanda Nails  mL/hr at 12/09/22 0803 500 mg at 12/09/22 0212       Allergies:     Allergies   Allergen Reactions    Ciprofloxacin Hcl      Made me sick     Cleocin [Clindamycin Hcl] Hives     BRIGHT RED RASH    Lisinopril Other (See Comments)     Fever chills    Pcn [Penicillins]     Statins Other (See Comments)     Extreme muscle fatigue    Penciclovir Rash       Problem List:    Patient Active Problem List   Diagnosis Code    Trochanteric bursitis M70.60    Hip pain M25.559    Sprain of acromioclavicular ligament S43.50XA    Shoulder pain M25.519    Left cataract H26.9    Right cataract H26.9    History of TIA (transient ischemic attack) Z86.73    Cerebral infarction ([facility] [address] I63.9    Arthralgia of right hip M25.551    Essential hypertension I10    Other hyperlipidemia E78.49    Myalgia M79.10    PMR (polymyalgia rheumatica) ([facility] M35.3    Arthropathy of cervical facet joint M47.812    Other specified hypothyroidism E03.8    C. difficile colitis A04.72    Actinic keratosis L57.0    Changes in skin texture R23.4    Inflamed seborrheic keratosis L82.0    Lipoma of skin and subcutaneous tissue (excluding face) D17.30    Anemia D64.9    Disorder of carotid artery ([facility]) I77.9    History of total knee replacement Z96.659    Impaired fasting glucose R73.01    Inadequate pain control R52    Osteoarthritis of multiple joints M15.9    Stage 3b chronic kidney disease ([facility]) N18.32    Osteoporosis M81.0    Adverse effect of statin T46.6X5A       Past Medical History:        Diagnosis Date    Arthritis     Broken ankle     Broken finger     Hyperlipidemia     Hypertension     Hyperthyroidism     Prolonged emergence from general anesthesia     Sinus pain     Stroke ([address] 2015    TIA  no deficits       Past Surgical History:        Procedure Laterality Date    APPENDECTOMY      CATARACT REMOVAL      left eye 8-    CATARACT REMOVAL WITH IMPLANT Right 02/04/2014    CHOLECYSTECTOMY      COLONOSCOPY      HIP SURGERY Right     tendon reattached    HYSTERECTOMY (CERVIX STATUS UNKNOWN)      KNEE ARTHROSCOPY Left 07/27/2017    TONSILLECTOMY      TOTAL KNEE ARTHROPLASTY Left 08/24/2020    Dr Bandar Chin [facility]       Social History:    Social History     Tobacco Use    Smoking status: Never    Smokeless tobacco: Never   Substance Use Topics    Alcohol use: No     Alcohol/week: 0.0 standard drinks                                Counseling given: Not Answered      Vital Signs (Current):   Vitals:    12/02/22 1329 12/09/22 0740   BP:  (!) 144/58   Pulse:  51   Resp:  16   Temp:  98.6 °F (37 °C)   TempSrc:  Skin   SpO2:  95%   Weight: 141 lb (64 kg) 138 lb 12.8 oz (63 kg)   Height: 5' 2\" (1.575 m) 5' 2\" (1.575 m)                                              BP Readings from Last 3 Encounters:   12/09/22 (!) 144/58   12/02/22 122/60   09/13/22 134/66       NPO Status: Time of last liquid consumption: 2100                        Time of last solid consumption: 2100                        Date of last liquid consumption: 12/08/22                        Date of last solid food consumption: 12/08/22    BMI:   Wt Readings from Last 3 Encounters:   12/09/22 138 lb 12.8 oz (63 kg)   12/02/22 141 lb (64 kg)   09/13/22 140 lb 1.6 oz (63.5 kg)     Body mass index is 25.39 kg/m².     CBC:   Lab Results   Component Value Date/Time    WBC 9.1 05/10/2022 08:19 AM    RBC 4.66 05/10/2022 08:19 AM    HGB 14.2 05/10/2022 08:19 AM    HCT 44.9 05/10/2022 08:19 AM    MCV 96.4 05/10/2022 08:19 AM    RDW 14.3 05/10/2022 08:19 AM     05/10/2022 08:19 AM       CMP:   Lab Results   Component Value Date/Time     12/05/2022 10:28 AM    K 4.0 12/05/2022 10:28 AM     12/05/2022 10:28 AM    CO2 26 12/05/2022 10:28 AM    BUN 24 12/05/2022 10:28 AM    CREATININE 1.2 12/05/2022 10:28 AM    GFRAA 52 09/08/2022 07:59 AM    LABGLOM 44 12/05/2022 10:28 AM    GLUCOSE 89 12/05/2022 10:28 AM    GLUCOSE 83 02/02/2012 08:15 AM    PROT 7.1 09/08/2022 07:59 AM    CALCIUM 10.0 12/05/2022 10:28 AM    BILITOT 0.3 09/08/2022 07:59 AM    ALKPHOS 108 09/08/2022 07:59 AM    AST 21 09/08/2022 07:59 AM    ALT 12 09/08/2022 07:59 AM       POC Tests: No results for input(s): POCGLU, POCNA, POCK, POCCL, POCBUN, POCHEMO, POCHCT in the last 72 hours.     Coags:   Lab Results   Component Value Date/Time    PROTIME 10.7 08/17/2020 11:40 AM    INR 0.9 08/17/2020 11:40 AM    APTT 27.7 08/17/2020 11:40 AM       HCG (If Applicable): No results found for: PREGTESTUR, PREGSERUM, HCG, HCGQUANT     ABGs: No results found for: PHART, PO2ART, SEC3ITX, VGQ0YJH, BEART, V5GEDCSQ     Type & Screen (If Applicable):  No results found for: LABABO, LABRH    Drug/Infectious Status (If Applicable):  No results found for: HIV, HEPCAB    COVID-19 Screening (If Applicable): No results found for: COVID19        Anesthesia Evaluation  Patient summary reviewed no history of anesthetic complications:   Airway: Mallampati: III  TM distance: <3 FB   Neck ROM: full  Mouth opening: > = 3 FB   Dental: normal exam         Pulmonary: breath sounds clear to auscultation                             Cardiovascular:    (+) hypertension:,       ECG reviewed  Rhythm: regular  Rate: normal      Cleared by cardiology     Beta Blocker:  Not on Beta Blocker         Neuro/Psych:   (+) CVA ( no deficits): no interval change,             GI/Hepatic/Renal:   (+) renal disease: CRI,           Endo/Other:    (+) hypothyroidism: arthritis ( PMR (polymyalgia rheumatica)): OA., .                  ROS comment: Hammer toe of left foot  Abdominal:             Vascular:   + PVD, aortic or cerebral, . Other Findings:           Anesthesia Plan      MAC     ASA 3     (Mild cardiac risk per Dr. Mary Veras)  Induction: intravenous. MIPS: Postoperative opioids intended and Prophylactic antiemetics administered. Anesthetic plan and risks discussed with patient. Plan discussed with CRNA. PAT Chart Review:  Chart reviewed per routine by Cuong Edwards MD.  Above represents information available via shared medical record including previous anesthesia history, drug and allergy history. Confirmation of above and final plan per Day of Surgery (DOS) anesthesiologist.    DOS STAFF ADDENDUM:    Pt seen and examined, chart reviewed (including anesthesia, drug and allergy history). Anesthetic plan, risks, benefits, alternatives, and personnel involved discussed with patient. Patient verbalized an understanding and agrees to proceed. Plan discussed with care team members and agreed upon.     Cuong Edwards MD  Staff Anesthesiologist  8:21 AM    Cuong Edwards MD   12/9/2022

## 2022-12-09 ENCOUNTER — HOSPITAL ENCOUNTER (OUTPATIENT)
Dept: OPERATING ROOM | Age: 87
Setting detail: OUTPATIENT SURGERY
Discharge: HOME | End: 2022-12-09
Attending: PODIATRIST
Payer: MEDICARE

## 2022-12-09 ENCOUNTER — ANESTHESIA (OUTPATIENT)
Dept: OPERATING ROOM | Age: 87
End: 2022-12-09
Payer: MEDICARE

## 2022-12-09 ENCOUNTER — HOSPITAL ENCOUNTER (OUTPATIENT)
Age: 87
Setting detail: OUTPATIENT SURGERY
Discharge: HOME | End: 2022-12-09
Attending: PODIATRIST | Admitting: PODIATRIST
Payer: MEDICARE

## 2022-12-09 VITALS
BODY MASS INDEX: 25.54 KG/M2 | DIASTOLIC BLOOD PRESSURE: 42 MMHG | SYSTOLIC BLOOD PRESSURE: 126 MMHG | HEIGHT: 62 IN | OXYGEN SATURATION: 95 % | TEMPERATURE: 97.7 F | RESPIRATION RATE: 14 BRPM | HEART RATE: 64 BPM | WEIGHT: 138.8 LBS

## 2022-12-09 DIAGNOSIS — M20.42 HAMMER TOE OF LEFT FOOT: Primary | ICD-10-CM

## 2022-12-09 DIAGNOSIS — M20.42 HAMMERTOE OF LEFT FOOT: ICD-10-CM

## 2022-12-09 PROCEDURE — C1776 JOINT DEVICE (IMPLANTABLE): HCPCS | Performed by: PODIATRIST

## 2022-12-09 PROCEDURE — 7100000011 HC PHASE II RECOVERY - ADDTL 15 MIN: Performed by: PODIATRIST

## 2022-12-09 PROCEDURE — 2500000003 HC RX 250 WO HCPCS: Performed by: PODIATRIST

## 2022-12-09 PROCEDURE — 2709999900 HC NON-CHARGEABLE SUPPLY: Performed by: PODIATRIST

## 2022-12-09 PROCEDURE — 2720000010 HC SURG SUPPLY STERILE: Performed by: PODIATRIST

## 2022-12-09 PROCEDURE — 2580000003 HC RX 258: Performed by: ANESTHESIOLOGY

## 2022-12-09 PROCEDURE — 3600000014 HC SURGERY LEVEL 4 ADDTL 15MIN: Performed by: PODIATRIST

## 2022-12-09 PROCEDURE — 2580000003 HC RX 258: Performed by: PODIATRIST

## 2022-12-09 PROCEDURE — 3700000001 HC ADD 15 MINUTES (ANESTHESIA): Performed by: PODIATRIST

## 2022-12-09 PROCEDURE — 7100000010 HC PHASE II RECOVERY - FIRST 15 MIN: Performed by: PODIATRIST

## 2022-12-09 PROCEDURE — 3700000000 HC ANESTHESIA ATTENDED CARE: Performed by: PODIATRIST

## 2022-12-09 PROCEDURE — C1713 ANCHOR/SCREW BN/BN,TIS/BN: HCPCS | Performed by: PODIATRIST

## 2022-12-09 PROCEDURE — 6360000002 HC RX W HCPCS: Performed by: NURSE ANESTHETIST, CERTIFIED REGISTERED

## 2022-12-09 PROCEDURE — 6360000002 HC RX W HCPCS: Performed by: PODIATRIST

## 2022-12-09 PROCEDURE — 3600000004 HC SURGERY LEVEL 4 BASE: Performed by: PODIATRIST

## 2022-12-09 PROCEDURE — 3209999900 FLUORO FOR SURGICAL PROCEDURES

## 2022-12-09 DEVICE — IMPLANTABLE DEVICE
Type: IMPLANTABLE DEVICE | Site: SECOND TOE | Status: FUNCTIONAL
Brand: PHALINX

## 2022-12-09 DEVICE — HAMMERTOE IMPLANT
Type: IMPLANTABLE DEVICE | Site: SECOND TOE | Status: FUNCTIONAL
Brand: PHALINX

## 2022-12-09 DEVICE — HAMMERTOE K-WIRE
Type: IMPLANTABLE DEVICE | Site: SECOND TOE | Status: FUNCTIONAL
Brand: PHALINX

## 2022-12-09 RX ORDER — OXYCODONE HYDROCHLORIDE AND ACETAMINOPHEN 5; 325 MG/1; MG/1
1 TABLET ORAL EVERY 6 HOURS PRN
Qty: 28 TABLET | Refills: 0 | Status: SHIPPED | OUTPATIENT
Start: 2022-12-09 | End: 2022-12-16

## 2022-12-09 RX ORDER — IPRATROPIUM BROMIDE AND ALBUTEROL SULFATE 2.5; .5 MG/3ML; MG/3ML
1 SOLUTION RESPIRATORY (INHALATION)
Status: CANCELLED | OUTPATIENT
Start: 2022-12-09 | End: 2022-12-10

## 2022-12-09 RX ORDER — DIPHENHYDRAMINE HYDROCHLORIDE 50 MG/ML
12.5 INJECTION INTRAMUSCULAR; INTRAVENOUS
Status: CANCELLED | OUTPATIENT
Start: 2022-12-09 | End: 2022-12-10

## 2022-12-09 RX ORDER — FENTANYL CITRATE 0.05 MG/ML
25 INJECTION, SOLUTION INTRAMUSCULAR; INTRAVENOUS EVERY 5 MIN PRN
Status: CANCELLED | OUTPATIENT
Start: 2022-12-09

## 2022-12-09 RX ORDER — SODIUM CHLORIDE, SODIUM LACTATE, POTASSIUM CHLORIDE, CALCIUM CHLORIDE 600; 310; 30; 20 MG/100ML; MG/100ML; MG/100ML; MG/100ML
INJECTION, SOLUTION INTRAVENOUS CONTINUOUS
Status: DISCONTINUED | OUTPATIENT
Start: 2022-12-09 | End: 2022-12-09 | Stop reason: HOSPADM

## 2022-12-09 RX ORDER — LABETALOL HYDROCHLORIDE 5 MG/ML
10 INJECTION, SOLUTION INTRAVENOUS
Status: CANCELLED | OUTPATIENT
Start: 2022-12-09

## 2022-12-09 RX ORDER — HYDRALAZINE HYDROCHLORIDE 20 MG/ML
10 INJECTION INTRAMUSCULAR; INTRAVENOUS
Status: CANCELLED | OUTPATIENT
Start: 2022-12-09

## 2022-12-09 RX ORDER — PROPOFOL 10 MG/ML
INJECTION, EMULSION INTRAVENOUS PRN
Status: DISCONTINUED | OUTPATIENT
Start: 2022-12-09 | End: 2022-12-09

## 2022-12-09 RX ORDER — MIDAZOLAM HYDROCHLORIDE 1 MG/ML
2 INJECTION INTRAMUSCULAR; INTRAVENOUS
Status: CANCELLED | OUTPATIENT
Start: 2022-12-09 | End: 2022-12-10

## 2022-12-09 RX ORDER — PROCHLORPERAZINE EDISYLATE 5 MG/ML
5 INJECTION INTRAMUSCULAR; INTRAVENOUS
Status: CANCELLED | OUTPATIENT
Start: 2022-12-09 | End: 2022-12-10

## 2022-12-09 RX ORDER — PROPOFOL 10 MG/ML
INJECTION, EMULSION INTRAVENOUS CONTINUOUS PRN
Status: DISCONTINUED | OUTPATIENT
Start: 2022-12-09 | End: 2022-12-09 | Stop reason: SDUPTHER

## 2022-12-09 RX ORDER — FENTANYL CITRATE 50 UG/ML
INJECTION, SOLUTION INTRAMUSCULAR; INTRAVENOUS PRN
Status: DISCONTINUED | OUTPATIENT
Start: 2022-12-09 | End: 2022-12-09 | Stop reason: SDUPTHER

## 2022-12-09 RX ORDER — MORPHINE SULFATE 2 MG/ML
2 INJECTION, SOLUTION INTRAMUSCULAR; INTRAVENOUS EVERY 5 MIN PRN
Status: CANCELLED | OUTPATIENT
Start: 2022-12-09

## 2022-12-09 RX ORDER — BUPIVACAINE HYDROCHLORIDE 5 MG/ML
INJECTION, SOLUTION EPIDURAL; INTRACAUDAL PRN
Status: DISCONTINUED | OUTPATIENT
Start: 2022-12-09 | End: 2022-12-09 | Stop reason: ALTCHOICE

## 2022-12-09 RX ORDER — ONDANSETRON 2 MG/ML
4 INJECTION INTRAMUSCULAR; INTRAVENOUS
Status: CANCELLED | OUTPATIENT
Start: 2022-12-09 | End: 2022-12-10

## 2022-12-09 RX ADMIN — VANCOMYCIN HYDROCHLORIDE 500 MG: 500 INJECTION, POWDER, LYOPHILIZED, FOR SOLUTION INTRAVENOUS at 08:03

## 2022-12-09 RX ADMIN — SODIUM CHLORIDE, POTASSIUM CHLORIDE, SODIUM LACTATE AND CALCIUM CHLORIDE: 600; 310; 30; 20 INJECTION, SOLUTION INTRAVENOUS at 08:02

## 2022-12-09 RX ADMIN — FENTANYL CITRATE 50 MCG: 50 INJECTION INTRAMUSCULAR; INTRAVENOUS at 08:53

## 2022-12-09 RX ADMIN — PROPOFOL INJECTABLE EMULSION 100 MCG/KG/MIN: 10 INJECTION, EMULSION INTRAVENOUS at 08:53

## 2022-12-09 RX ADMIN — FENTANYL CITRATE 50 MCG: 50 INJECTION INTRAMUSCULAR; INTRAVENOUS at 08:58

## 2022-12-09 ASSESSMENT — PAIN DESCRIPTION - DESCRIPTORS: DESCRIPTORS: ACHING

## 2022-12-09 ASSESSMENT — PAIN - FUNCTIONAL ASSESSMENT: PAIN_FUNCTIONAL_ASSESSMENT: 0-10

## 2022-12-09 NOTE — ANESTHESIA POSTPROCEDURE EVALUATION
Department of Anesthesiology  Postprocedure Note    Patient: Vishal Huston  MRN: [id number]  YOB: 1935  Date of evaluation: 12/9/2022      Procedure Summary     Date: 12/09/22 Room / Location: [address] [place] [address] 03 / 4199 [facility]    Anesthesia Start: 9517 Anesthesia Stop: 8059    Procedure: HAMMERTOE CORRECTIONS 2, 3, 4 TOES LEFT FOOT (Left: Toes) Diagnosis:       Hammer toe of left foot      (Hammer toe of left foot [M20.42])    Surgeons: Jose Kang DPM Responsible Provider: Sherron Barragan MD    Anesthesia Type: MAC ASA Status: 3          Anesthesia Type: MAC    Morris Phase I: Morris Score: 10    Morris Phase II: Morris Score: 9      Anesthesia Post Evaluation    Patient location during evaluation: PACU  Patient participation: complete - patient participated  Level of consciousness: awake and alert  Airway patency: patent  Nausea & Vomiting: no nausea and no vomiting  Complications: no  Cardiovascular status: hemodynamically stable  Respiratory status: acceptable  Hydration status: euvolemic

## 2022-12-09 NOTE — OP NOTE
Operative Note      Patient: Malathi Joseph  YOB: 1935  MRN: [id number]    Date of Procedure: 12/9/2022    Pre-Op Diagnosis: Hammer toe of left foot [M20.42]    Post-Op Diagnosis: Same       Procedure(s):  HAMMERTOE CORRECTIONS 2, 3, 4 TOES LEFT FOOT    Surgeon(s):  Mary Anne Motley DPM    Assistant:   Resident Dr. Karolina Nicholas     Anesthesia: Monitor Anesthesia Care    Estimated Blood Loss (mL): Minimal    Complications: None    Specimens:   * No specimens in log *    Implants:  Implant Name Type Inv. Item Serial No.  Lot No. LRB No. Used Action   IMPLANT TOE M 0DEG NAIMA FOR HAMRTOE FIX PHALINX - [id number]  IMPLANT TOE M 0DEG NAIMA FOR HAMRTOE  [facility]  Left 2 Implanted   KWIRE FIX L102MM [id number] FOR HAMRTOE FIX PHALINX - [id number]  KWIRE FIX L102MM [id number] FOR HAMRTOE FIX PHALINX  [facility]  Left 2 Implanted   JOINT TOE NAIMA 0 DEG LG HAMRTOE PHALINX - [id number]  JOINT TOE NAIMA 0 DEG LG HAMRTOE PHALINX  [facility]  Left 1 Implanted   WIRE FIX [id number] INSRT K - [id number]  WIRE FIX [id number] INSRT K  [facility]  Left 1 Implanted         Drains: * No LDAs found *    Findings: Consistent with diagnosis    Detailed Description of Procedure:   Patient presented into the operative room and placed in the operative table in the supine position. Patiently with IV sedation by department esthesia. Once sedated the patient's operative foot was localized by the surgeon comprised of an equal mixture of 0.5% Marcaine plain along with 2% lidocaine plain totaling 10 cc injected throughout the operative toes after which time the patient's foot was prepared scrubbed and draped in a sterile fashion.   Tourniquet utilized for the duration of the procedure at a pressure of 200 mmHg pressure at the level of the ankle    Attention was first directed to the left foot where after the foot was draped prepped and timeout completed and tourniquet inflated and incision was made on the fourth toe incision was deepened with sharp and blunt dissection down to the proximal interphalangeal joint a 15 blade was then used to make incision into the joint once the capsule was incised and in the cartilaginous cartilaginous surface visualized the cartilaginous surface was resected with a sagittal saw once resected the toe was then placed in an end-to-end position and fixated with the [facility] phalanx system once fixated confirmed by x-ray the surgical site was flushed with saline the deep structures then reapproximated with 3-0 Monocryl and the skin was then reapproximated 4-0 nylon the exposed K wire was cut and a Jurgan ball applied    Attention was then directed to the third toe left foot where correction of the hammertoe was performed in a fashion similar to that stated in the above surgical note    Attention then directed to the second toe left foot where correction of the hammertoe was performed in a fashion similar to that stated in the above surgical note for the fourth toe    At this time the foot was then cleansed a dry sterile dressing applied tourniquet deflated good cap refill time returned once stabilized patient left the operative room went to recovery and be seen the office 1 week for postop visit    Electronically signed by Sundar Banegas DPM on 12/9/2022 at 9:49 AM    Carlos Hollins DPM   Board Certified Foot and Ankle Surgeon  Office: [phone]  Cell:  [phone] Yes No

## 2022-12-09 NOTE — PROGRESS NOTES
Patient was seen and evaluated prior to surgery and understands all the risks and benefits of the procedure as well as the risks and benefits of forgoing the procedure. She is still amenable to surgery at this time. She was medically cleared for surgery by Dr. Loly Millan on 12/02/2022. She was cleared by Anesthesiology on 12/08/2022. Update History & Physical    The patient's History and Physical of December 2,  was reviewed with the patient and I examined the patient. There was no change. The surgical site was confirmed by the patient and me. Height 5' 2\" (1.575 m), weight 141 lb (64 kg), not currently breastfeeding. Plan: The risks, benefits, expected outcome, and alternative to the recommended procedure have been discussed with the patient. Patient understands and wants to proceed with the procedure.      Electronically signed by Matthew Matias DPM on 12/9/2022 at 7:06 AM

## 2022-12-09 NOTE — DISCHARGE INSTRUCTIONS
General Post-Op Instructions  DAVID SierraPALAN  [phone]    Post Operative Footcare Instructions:    1. Keep your dressings clean and dry. A clean dressing promotes healthy healing. 2.   DO NOT remove the dressing from your foot/ankle, unless instructed by your doctor. 3.   Elevate your foot above the level of your heart for the first 3-4 days as much as possible. 4.   Place ice on top of your foot/ankle 15 min. every waking hour for the first 3-4 days. 5.   Take your pain medication, with food,as prescribed. (follow all instructions regarding prescription medications)  6. Wear your surgical shoe at all times, and even to bed the first 3-4 days post-op. 7.   DO NOT take a shower and get your foot wet. 8.   You may take a sponge bath or hang you foot over the side of the bath tub.  9.  Take your temperature daily each morning, and call the doctor if your temp. is higher than                            102.5. 10.  You may  walk on your foot as tolerated. 11.  Resume normal diet and medications (unless otherwise instructed by your doctor). 12.  No driving until approved by Dr. Polanco Congress  13. You should have a responsible adult with you for at 24 hrs. ************************************************************************************    1.   Call the office at [phone] to make an appointment. 2.   Your first post-op appointment should be made tomorrow for within 1 week of the surgery. 3.  To contact the doctor, you may do so by calling [phone] or [phone].   4.   Take it easy for the next few days and remember, If you are good to your foot, it will be                                    good to you.         If any problems occur or if you have any further questions, please call your doctor as soon as possible. If you find that you cannot reach your doctor but feel that your condition needs a doctors attention go to an emergency room. Infection After Surgery: Care Instructions  Overview  After surgery, an infection is always possible. It doesn't mean that the surgery didn't go well. Because an infection can be serious, your doctor has taken steps to manage it. Your doctor checked the infection and cleaned it if necessary. Your doctor may have made an opening in the area so that the pus can drain out. You may have gauze in the cut so that the area will stay open and keep draining. You may need antibiotics. You will need to follow up with your doctor to make sure the infection has gone away. Follow-up care is a key part of your treatment and safety. Be sure to make and go to all appointments, and call your doctor if you are having problems. It's also a good idea to know your test results and keep a list of the medicines you take. How can you care for yourself at home? Make sure your surgeon knows about the infection, especially if you saw another doctor about your symptoms. If your doctor prescribed antibiotics, take them as directed. Do not stop taking them just because you feel better. You need to take the full course of antibiotics. Ask your doctor if you can take an over-the-counter pain medicine, such as acetaminophen (Tylenol), ibuprofen (Advil, Motrin), or naproxen (Aleve). Be safe with medicines. Read and follow all instructions on the label. Do not take two or more pain medicines at the same time unless the doctor told you to. Many pain medicines have acetaminophen, which is Tylenol. Too much acetaminophen (Tylenol) can be harmful. Prop up the area on a pillow anytime you sit or lie down during the next 3 days. Try to keep it above the level of your heart. This will help reduce swelling. Keep the skin clean and dry. You may have a bandage over the cut (incision). A bandage helps the incision heal and protects it. Your doctor will tell you how to take care of this. Keep it clean and dry.  You may have drainage from the wound.  If your doctor told you how to care for your incision, follow your doctor's instructions. If you did not get instructions, follow this general advice:  Wash around the incision with clean water 2 times a day. Don't use hydrogen peroxide or alcohol, which can slow healing. When should you call for help? Call your doctor now or seek immediate medical care if:    You have signs that your infection is getting worse, such as: Increased pain, swelling, warmth, or redness in the area. Red streaks leading from the area. Pus draining from the wound. A new or higher fever. Watch closely for changes in your health, and be sure to contact your doctor if you have any problems. Where can you learn more? Go to http://www.woods.com/ and enter C340 to learn more about \"Infection After Surgery: Care Instructions. \"  Current as of: January 20, 2022               Content Version: 13.5  © 2006-2022 [employer]. Care instructions adapted under license by [facility] ([facility]). If you have questions about a medical condition or this instruction, always ask your healthcare professional. [place] [address] any warranty or liability for your use of this information. Nausea and Vomiting After Surgery: Care Instructions  Your Care Instructions     After you've had surgery, you may feel sick to your stomach (nauseated) or you may vomit. Sometimes anesthesia can make you feel sick. It's a common side effect and often doesn't last long. Pain also can make you feel sick or vomit. After the anesthesia wears off, you may feel pain from the incision (cut). That pain could then upset your stomach. Taking pain medicine can also make you feel sick to your stomach. Whatever the cause, you may get medicine that can help. There are also some things you can do at home to prevent nausea and feel better. The doctor has checked you carefully, but problems can develop later.  If you notice any problems or new symptoms, get medical treatment right away. Follow-up care is a key part of your treatment and safety. Be sure to make and go to all appointments, and call your doctor if you are having problems. It's also a good idea to know your test results and keep a list of the medicines you take. How can you care for yourself at home? Be safe with medicines. Read and follow all instructions on the label. If the doctor gave you a prescription medicine for pain, take it as prescribed. If you are not taking a prescription pain medicine, ask your doctor if you can take an over-the-counter medicine. Take your pain medicine as soon as you have pain. It works better if you take it before the pain gets bad. Call your doctor if you have any problems with your medicine. Rest in bed until you feel better. To prevent dehydration, drink plenty of fluids. Choose water and other clear liquids until you feel better. If you have kidney, heart, or liver disease and have to limit fluids, talk with your doctor before you increase the amount of fluids you drink. When you are able to eat, try clear soups, mild foods, and liquids until all symptoms are gone for 12 to 48 hours. Other good choices include dry toast, crackers, cooked cereal, and gelatin dessert, such as Jell-O. Do not smoke. Smoking and being around smoke can make nausea worse. If you need help quitting, talk to your doctor about stop-smoking programs and medicines. These can increase your chances of quitting for good. When should you call for help? Call 911  anytime you think you may need emergency care. For example, call if:    You passed out (lost consciousness). Call your doctor now or seek immediate medical care if:    You have new or worse nausea or vomiting. You are too sick to your stomach to drink any fluids. You cannot keep down fluids. You have symptoms of dehydration, such as:  Dry eyes and a dry mouth.   Passing only a little urine.  Feeling thirstier than usual.     Your pain medicine is not helping. You are dizzy or lightheaded, or you feel like you may faint. Watch closely for changes in your health, and be sure to contact your doctor if:    You do not get better as expected. Current as of: March 9, 2022               Content Version: 13.5  © 2006-2022 [employer]. Care instructions adapted under license by [facility] ([facility]). If you have questions about a medical condition or this instruction, always ask your healthcare professional. [address] any warranty or liability for your use of this information.

## 2022-12-19 ENCOUNTER — OFFICE VISIT (OUTPATIENT)
Dept: FAMILY MEDICINE CLINIC | Age: 87
End: 2022-12-19
Payer: MEDICARE

## 2022-12-19 VITALS
SYSTOLIC BLOOD PRESSURE: 138 MMHG | HEART RATE: 64 BPM | DIASTOLIC BLOOD PRESSURE: 70 MMHG | HEIGHT: 62 IN | WEIGHT: 139.2 LBS | OXYGEN SATURATION: 97 % | TEMPERATURE: 97.1 F | BODY MASS INDEX: 25.62 KG/M2

## 2022-12-19 DIAGNOSIS — T46.6X5A ADVERSE EFFECT OF STATIN: ICD-10-CM

## 2022-12-19 DIAGNOSIS — E03.8 OTHER SPECIFIED HYPOTHYROIDISM: ICD-10-CM

## 2022-12-19 DIAGNOSIS — I10 ESSENTIAL HYPERTENSION: Primary | ICD-10-CM

## 2022-12-19 DIAGNOSIS — E78.49 OTHER HYPERLIPIDEMIA: ICD-10-CM

## 2022-12-19 DIAGNOSIS — Z86.73 HISTORY OF TIA (TRANSIENT ISCHEMIC ATTACK): ICD-10-CM

## 2022-12-19 PROCEDURE — 1123F ACP DISCUSS/DSCN MKR DOCD: CPT | Performed by: INTERNAL MEDICINE

## 2022-12-19 PROCEDURE — 3074F SYST BP LT 130 MM HG: CPT | Performed by: INTERNAL MEDICINE

## 2022-12-19 PROCEDURE — G8484 FLU IMMUNIZE NO ADMIN: HCPCS | Performed by: INTERNAL MEDICINE

## 2022-12-19 PROCEDURE — 3078F DIAST BP <80 MM HG: CPT | Performed by: INTERNAL MEDICINE

## 2022-12-19 PROCEDURE — 99214 OFFICE O/P EST MOD 30 MIN: CPT | Performed by: INTERNAL MEDICINE

## 2022-12-19 PROCEDURE — 1036F TOBACCO NON-USER: CPT | Performed by: INTERNAL MEDICINE

## 2022-12-19 PROCEDURE — G8417 CALC BMI ABV UP PARAM F/U: HCPCS | Performed by: INTERNAL MEDICINE

## 2022-12-19 PROCEDURE — G8427 DOCREV CUR MEDS BY ELIG CLIN: HCPCS | Performed by: INTERNAL MEDICINE

## 2022-12-19 PROCEDURE — 1090F PRES/ABSN URINE INCON ASSESS: CPT | Performed by: INTERNAL MEDICINE

## 2022-12-19 PROCEDURE — G8399 PT W/DXA RESULTS DOCUMENT: HCPCS | Performed by: INTERNAL MEDICINE

## 2022-12-19 RX ORDER — TRIAMTERENE AND HYDROCHLOROTHIAZIDE 37.5; 25 MG/1; MG/1
1 TABLET ORAL DAILY
Qty: 90 TABLET | Refills: 1 | Status: SHIPPED | OUTPATIENT
Start: 2022-12-19

## 2022-12-19 RX ORDER — CLONIDINE HYDROCHLORIDE 0.1 MG/1
0.1 TABLET ORAL 2 TIMES DAILY
Qty: 180 TABLET | Refills: 1 | Status: SHIPPED | OUTPATIENT
Start: 2022-12-19

## 2022-12-19 RX ORDER — LEVOTHYROXINE SODIUM 0.05 MG/1
50 TABLET ORAL NIGHTLY
Qty: 90 TABLET | Refills: 1 | Status: SHIPPED | OUTPATIENT
Start: 2022-12-19 | End: 2023-03-19

## 2022-12-19 ASSESSMENT — ENCOUNTER SYMPTOMS
EYE DISCHARGE: 0
SORE THROAT: 0
ABDOMINAL PAIN: 0
BLOOD IN STOOL: 0
SHORTNESS OF BREATH: 0
NAUSEA: 0
SINUS PAIN: 0
EYE PAIN: 0

## 2022-12-19 NOTE — PROGRESS NOTES
Chief Complaint   Patient presents with    Hypertension     Pt here for 3 month follow up on hypertension, had surgery for hammertoe on 12/9/2022. Pt reports doing good      Medication Refill    Health Maintenance     Shingles vaccine - pt declined        HPI:  Patient is here for follow-up     Had foot surgery for Hammer toes   Has post op boot on left foot- following with Dr Dre Vazquez and Medications are reviewed and updated. Past Medical History, Surgical History, and Family History has been reviewed and updated. Review of Systems:  Review of Systems   Constitutional:  Negative for chills and fever. HENT:  Negative for congestion, sinus pain and sore throat. Eyes:  Negative for pain and discharge. Respiratory:  Negative for shortness of breath (No new SOb). Cardiovascular:  Negative for chest pain. Gastrointestinal:  Negative for abdominal pain, blood in stool and nausea. Genitourinary:  Negative for flank pain and frequency. Musculoskeletal:  Negative for neck pain. Hematological:  Does not bruise/bleed easily. Psychiatric/Behavioral:  Negative for suicidal ideas. Vitals:    12/19/22 1136   BP: 138/70   Position: Sitting   Pulse: 64   Temp: 97.1 °F (36.2 °C)   TempSrc: Temporal   SpO2: 97%   Weight: 139 lb 3.2 oz (63.1 kg)   Height: 5' 2\" (1.575 m)       Physical Exam  Vitals reviewed. Constitutional:       Appearance: She is well-developed. HENT:      Head: Normocephalic and atraumatic. Nose: Nose normal.   Eyes:      Conjunctiva/sclera: Conjunctivae normal.      Pupils: Pupils are equal, round, and reactive to light. Neck:      Vascular: No JVD. Cardiovascular:      Rate and Rhythm: Normal rate and regular rhythm. Pulmonary:      Effort: Pulmonary effort is normal.      Breath sounds: Normal breath sounds. Abdominal:      General: Bowel sounds are normal.      Palpations: Abdomen is soft. Musculoskeletal:         General: Normal range of motion. Skin:     General: Skin is warm and dry. Neurological:      Mental Status: She is alert and oriented to person, place, and time.    Psychiatric:         Behavior: Behavior normal.        Labs :    Lab Results   Component Value Date    WBC 9.1 05/10/2022    HGB 14.2 05/10/2022    HCT 44.9 05/10/2022     05/10/2022    CHOL 200 (H) 01/26/2018    TRIG 170 (H) 01/26/2018    HDL 43 09/08/2022    ALT 12 09/08/2022    AST 21 09/08/2022     12/05/2022    K 4.0 12/05/2022     12/05/2022    CREATININE 1.2 (H) 12/05/2022    BUN 24 (H) 12/05/2022    CO2 26 12/05/2022    TSH 3.280 09/08/2022    INR 0.9 08/17/2020    GLUF 83 09/08/2022    LABA1C 5.8 07/21/2015     Lab Results   Component Value Date/Time    COLORU Yellow 05/10/2022 08:19 AM    NITRU Negative 05/10/2022 08:19 AM    GLUCOSEU Negative 05/10/2022 08:19 AM    GLUCOSEU NEGATIVE 12/07/2011 12:00 PM    KETUA Negative 05/10/2022 08:19 AM    UROBILINOGEN 0.2 05/10/2022 08:19 AM    BILIRUBINUR Negative 05/10/2022 08:19 AM    BILIRUBINUR NEGATIVE 12/07/2011 12:00 PM           ASSESSMENT     Patient Active Problem List    Diagnosis Date Noted    Hammer toe of left foot 12/09/2022     Priority: Medium    Stage 3b chronic kidney disease ([address] 09/13/2022     Priority: Medium    Osteoporosis 09/13/2022     Priority: Medium    Adverse effect of statin 09/13/2022     Priority: Medium    Anemia 11/19/2021    Disorder of carotid artery ([address] 11/19/2021    History of total knee replacement 11/19/2021    Impaired fasting glucose 11/19/2021    Inadequate pain control 11/19/2021    Osteoarthritis of multiple joints 11/19/2021    Actinic keratosis 07/20/2021    Changes in skin texture 07/20/2021    Inflamed seborrheic keratosis 07/20/2021    Lipoma of skin and subcutaneous tissue (excluding face) 07/20/2021    C. difficile colitis 02/11/2019    Other specified hypothyroidism 05/31/2018    Essential hypertension 02/21/2018    Other hyperlipidemia 02/21/2018     Overview Note:     Can not tolerate Statin      Myalgia 02/21/2018    PMR (polymyalgia rheumatica) ([facility] 02/21/2018    Arthropathy of cervical facet joint 02/21/2018    Arthralgia of right hip 03/07/2016    Cerebral infarction [facility])      Overview Note:     Replacing Inactive Diagnoses      History of TIA (transient ischemic attack) 07/20/2015    Right cataract 02/04/2014    Left cataract 08/27/2013    Sprain of acromioclavicular ligament 06/04/2012     Overview Note:     replace inactive diagnosis      Shoulder pain 06/04/2012    Trochanteric bursitis 11/01/2011    Hip pain 11/01/2011        Diagnosis:   1. Essential hypertension  -     cloNIDine (CATAPRES) 0.1 MG tablet; Take 1 tablet by mouth 2 times daily, Disp-180 tablet, R-1Normal  -     triamterene-hydroCHLOROthiazide (MAXZIDE-25) 37.5-25 MG per tablet; Take 1 tablet by mouth daily TAKE 1 TABLET EVERY DAY, Disp-90 tablet, R-1Normal  -     CBC with Auto Differential; Future  -     Comprehensive Metabolic Panel, Fasting; Future  -     Lipid, Fasting; Future  -     TSH; Future  -     Urinalysis with Microscopic; Future  2. Other specified hypothyroidism  -     levothyroxine (SYNTHROID) 50 MCG tablet; Take 1 tablet by mouth at bedtime, Disp-90 tablet, R-1Normal  -     TSH; Future  3. Other hyperlipidemia  -     Comprehensive Metabolic Panel, Fasting; Future  -     Lipid, Fasting; Future  4. History of TIA (transient ischemic attack)  5. Adverse effect of statin         PLAN:     Advise to have ( Fasting) lab test prior to next visit. Pt is stable on current medical treatment. Continue current treatment plan    Side effects/Adverse effects/Precautions are reviewed with patient. Low salt, Low Carb diet an low fat diet  Continue medications as advised and take them regularly  Regular exercises as advised    Discussed natural and expected course of this diagnosis and need to alert me if symptoms do not follow expected course, or if any worse.     Smoking cessation if applicable, discussed with patient. Patient Instructions   The medication list included in this document is our record of what you are currently taking, including any changes that were made at today's visit. If you find any differences when compared to your medications at home, or have any questions that were not answered at your visit, please contact the office. Advise to have ( Fasting) lab test prior to next visit.         Return in about 2 months (around 2/19/2023) for Annual Medicare Wellness Exam.

## 2022-12-23 NOTE — H&P
Update History & Physical    The patient's History and Physical of 12 / 9 / 22 was reviewed with the patient and there were no significant changes. I examined the patient and there were no significant changes from the previous History and Physical.    Blood pressure (!) 126/42, pulse 64, temperature 97.7 °F (36.5 °C), temperature source Skin, resp. rate 14, height 5' 2\" (1.575 m), weight 138 lb 12.8 oz (63 kg), SpO2 95 %, not currently breastfeeding. Plan: The risk, benefits, expected outcome, and alternative to the recommended procedure have been discussed with the patient. Patient understands and wants to proceed with the procedure.     Electronically signed by Yoanna Lopez DPM on 12/23/22 at 10:25 AM EST

## 2022-12-23 NOTE — H&P
Update History & Physical    The patient's History and Physical of 12 / 9 / 22 was reviewed with the patient and there were no significant changes. I examined the patient and there were no significant changes from the previous History and Physical.    Blood pressure (!) 126/42, pulse 64, temperature 97.7 °F (36.5 °C), temperature source Skin, resp. rate 14, height 5' 2\" (1.575 m), weight 138 lb 12.8 oz (63 kg), SpO2 95 %, not currently breastfeeding. Plan: The risk, benefits, expected outcome, and alternative to the recommended procedure have been discussed with the patient. Patient understands and wants to proceed with the procedure.     Electronically signed by Yoanna Lopez DPM on 12/23/22 at 10:29 AM EST

## 2023-01-18 ENCOUNTER — HOSPITAL ENCOUNTER (EMERGENCY)
Age: 88
Discharge: HOME | End: 2023-01-18
Payer: MEDICARE

## 2023-01-18 VITALS
TEMPERATURE: 98.3 F | BODY MASS INDEX: 23.78 KG/M2 | WEIGHT: 130 LBS | SYSTOLIC BLOOD PRESSURE: 156 MMHG | RESPIRATION RATE: 16 BRPM | HEART RATE: 67 BPM | OXYGEN SATURATION: 98 % | DIASTOLIC BLOOD PRESSURE: 72 MMHG

## 2023-01-18 DIAGNOSIS — L03.119 CELLULITIS OF LOWER EXTREMITY, UNSPECIFIED LATERALITY: Primary | ICD-10-CM

## 2023-01-18 PROCEDURE — 99211 OFF/OP EST MAY X REQ PHY/QHP: CPT

## 2023-01-18 RX ORDER — DOXYCYCLINE HYCLATE 100 MG/1
100 CAPSULE ORAL 2 TIMES DAILY
COMMUNITY

## 2023-01-18 RX ORDER — CEPHALEXIN 500 MG/1
500 CAPSULE ORAL 4 TIMES DAILY
Qty: 28 CAPSULE | Refills: 0 | Status: SHIPPED | OUTPATIENT
Start: 2023-01-18 | End: 2023-01-25

## 2023-01-18 ASSESSMENT — PAIN DESCRIPTION - ORIENTATION: ORIENTATION: LEFT

## 2023-01-18 ASSESSMENT — PAIN DESCRIPTION - LOCATION: LOCATION: TOE (COMMENT WHICH ONE)

## 2023-01-18 ASSESSMENT — PAIN SCALES - GENERAL: PAINLEVEL_OUTOF10: 2

## 2023-01-18 ASSESSMENT — PAIN - FUNCTIONAL ASSESSMENT: PAIN_FUNCTIONAL_ASSESSMENT: 0-10

## 2023-01-18 NOTE — ED PROVIDER NOTES
[address] ENCOUNTER        Pt Name: Samir Leong  MRN: [id number]  [place] 1935  Date of evaluation: 1/18/2023  Provider: AGNIESZKA Coleman - SURJIT  PCP: Gregory Carty MD  Note Started: 5:31 PM EST 1/18/23    CHIEF COMPLAINT       Chief Complaint   Patient presents with    Foot Problem     Foot surgery on 12/9/22, podiatrist told her to go to  for IV antibiotics, she has been on oral abx since surgery, left 2nd toe is very red, swollen       HISTORY OF PRESENT ILLNESS: 1 or more Elements   History From: patient    Limitations to history : None    Samir Leong is a 82 y.o. female who presents relation she had surgery on her left foot and her second third and fourth toes are still swollen the surgery was on 12 / 9 says she has been on antibiotics for weeks and has not gotten any better her podiatrist told her to go to urgent care to get IV antibiotics if it does not work this time and so she came to urgent care today. He said the surgery was to fix hammertoes. She said she wants switch to a different antibiotic. She is not running a fever she does not have any other complaints. Nursing Notes were all reviewed and agreed with or any disagreements were addressed in the HPI. REVIEW OF SYSTEMS :      Review of Systems    Positives and Pertinent negatives as per HPI.      SURGICAL HISTORY     Past Surgical History:   Procedure Laterality Date    APPENDECTOMY      CATARACT REMOVAL      left eye 8-    CATARACT REMOVAL WITH IMPLANT Right 02/04/2014    CHOLECYSTECTOMY      COLONOSCOPY      HAMMER TOE SURGERY Left 12/9/2022    HAMMERTOE CORRECTIONS 2, 3, 4 TOES LEFT FOOT performed by Isauro Murphy DPM at [address] [place] [address]  Right     tendon reattached    HYSTERECTOMY (CERVIX STATUS UNKNOWN)      KNEE ARTHROSCOPY Left 07/27/2017    TONSILLECTOMY      TOTAL KNEE ARTHROPLASTY Left 08/24/2020    Dr Whitney Lopez CURRENTMEDICATIONS       Previous Medications    ALENDRONATE (FOSAMAX) 70 MG TABLET    70 mg One q week    ASPIRIN 81 MG EC TABLET    Take 81 mg by mouth daily States Stopped 1 week pre op per Dr Victor Manuel Vazquez instructions    B COMPLEX VITAMINS (VITAMIN B COMPLEX PO)    Daily    BIOTIN 23126 MCG TBDP    Take by mouth daily    CALCIUM-MAGNESIUM-ZINC (JUVE-MAG-ZINC PO)    Take by mouth daily    CHOLECALCIFEROL (VITAMIN D3) 10 MCG (400 UNIT) CAPS    Take 1 capsule by mouth daily    CLONIDINE (CATAPRES) 0.1 MG TABLET    Take 1 tablet by mouth 2 times daily    DOXYCYCLINE HYCLATE (VIBRAMYCIN) 100 MG CAPSULE    Take 100 mg by mouth 2 times daily    FOLIC ACID PO    Take 1 tablet by mouth daily     LEVOTHYROXINE (SYNTHROID) 50 MCG TABLET    Take 1 tablet by mouth at bedtime    LOSARTAN (COZAAR) 50 MG TABLET    Take 1 tablet by mouth daily    METHYLSULFONYLMETHANE (MSM) 1000 MG TABS    Take 1 tablet by mouth daily     PREDNISONE (DELTASONE) 1 MG TABLET    Take 4 mg by mouth daily     TRIAMTERENE-HYDROCHLOROTHIAZIDE (MAXZIDE-25) 37.5-25 MG PER TABLET    Take 1 tablet by mouth daily TAKE 1 TABLET EVERY DAY    VITAMIN C (ASCORBIC ACID) 500 MG TABLET    Take 500 mg by mouth daily       ALLERGIES     Ciprofloxacin hcl, Cleocin [clindamycin hcl], Lisinopril, Pcn [penicillins], Statins, and Penciclovir    FAMILYHISTORY     History reviewed. No pertinent family history.      SOCIAL HISTORY       Social History     Tobacco Use    Smoking status: Never    Smokeless tobacco: Never   Vaping Use    Vaping Use: Never used   Substance Use Topics    Alcohol use: No     Alcohol/week: 0.0 standard drinks    Drug use: No       SCREENINGS                         CIWA Assessment  BP: (!) 156/72  Heart Rate: 67           PHYSICAL EXAM  1 or more Elements     ED Triage Vitals   BP Temp Temp src Heart Rate Resp SpO2 Height Weight   01/18/23 1703 01/18/23 1703 -- 01/18/23 1703 01/18/23 1703 01/18/23 1703 -- 01/18/23 1659   (!) 156/72 98.3 °F (36.8 °C)  67 16 98 %  130 lb (59 kg)       Physical Exam        Constitutional/General: Alert and oriented x3  Head: Normocephalic and atraumatic  Eyes:  conjunctiva normal, sclera non icteric  ENT:  handling secretions,   Neck: Supple, full ROM, \  Respiratory: Not in respiratory distress  Cardiovascular:  Regular rate. Regular rhythm. Musculoskeletal: Moves all extremities x 4. Integument: skin warm and dry. No rashes. I did examine her left foot her second and third toes are slightly erythematous and slightly edematous she has a scabbed area on the second toe. She said the scabbed areas where she had a pin in. Neurologic: GCS 15, no focal deficits,   Psychiatric: Normal Affect            DIAGNOSTIC RESULTS   LABS:    Labs Reviewed - No data to display    As interpreted by me, the above displayed labs are abnormal. All other labs obtained during this visit were within normal range or not returned as of this dictation. RADIOLOGY:   Non-plain film images such as CT, Ultrasound and MRI are read by the radiologist. Plain radiographic images are visualized and preliminarily interpreted by the ED Provider with the below findings:        Interpretation per the Radiologist below, if available at the time of this note:    No orders to display     No results found. No results found. PROCEDURES   Unless otherwise noted below, none     Procedures      PAST MEDICAL HISTORY/Chronic Conditions Affecting Care      has a past medical history of Arthritis, Broken ankle, Broken finger, Hyperlipidemia, Hypertension, Hyperthyroidism, Prolonged emergence from general anesthesia, Sinus pain, and Stroke ([facility] [address] (2015).      EMERGENCY DEPARTMENT COURSE    Vitals:    Vitals:    01/18/23 1659 01/18/23 1703   BP:  (!) 156/72   Pulse:  67   Resp:  16   Temp:  98.3 °F (36.8 °C)   SpO2:  98%   Weight: 130 lb (59 kg)        Patient was given the following medications:  Medications - No data to display                Medical Decision Making/Differential Diagnosis:    CC/HPI Summary, Social Determinants of health, Records Reviewed, DDx, testing done/not done, ED Course, Reassessment, disposition considerations/shared decision making with patient, consults, disposition:        Told her we do not do IV antibiotics here in urgent care. She said that she does not want to go to the ED she wants  switched to a different antibiotic. She said the doxycycline is not working and she has been on it for 3 weeks I did tell her that I would switch her to Keflex she said she is allergic to penicillin but it was not really an allergy she said it caused her to have C. difficile. I did tell her any antibiotics can cause C. difficile and says she needs to be careful with taking so many antibiotics. I did switch her to the Keflex and advised her if this does not work she has to go through the ED or see her family doctor we do not do IV antibiotics here. CONSULTS: (Who and What was discussed)  None        I am the Primary Clinician of Record. FINAL IMPRESSION      1.  Cellulitis of lower extremity, unspecified laterality          DISPOSITION/PLAN     DISPOSITION Decision To Discharge 01/18/2023 05:27:10 PM      PATIENT REFERRED TO:  Dion Connors MD  [address] [place] [id number]    Schedule an appointment as soon as possible for a visit in 1 day        DISCHARGE MEDICATIONS:  New Prescriptions    CEPHALEXIN (KEFLEX) 500 MG CAPSULE    Take 1 capsule by mouth 4 times daily for 7 days       DISCONTINUED MEDICATIONS:  Discontinued Medications    No medications on file              (Please note that portions of this note were completed with a voice recognition program.  Efforts were made to edit the dictations but occasionally words are mis-transcribed.)    AGNIESZKA Steiner CNP (electronically signed)          AGNIESZKA Steiner CNP  01/18/23 0629

## 2023-03-09 DIAGNOSIS — E03.8 OTHER SPECIFIED HYPOTHYROIDISM: ICD-10-CM

## 2023-03-09 DIAGNOSIS — I10 ESSENTIAL HYPERTENSION: ICD-10-CM

## 2023-03-09 DIAGNOSIS — E78.49 OTHER HYPERLIPIDEMIA: ICD-10-CM

## 2023-03-09 LAB
ALBUMIN SERPL-MCNC: 4.1 G/DL (ref 3.5–5.2)
ALP BLD-CCNC: 82 U/L (ref 35–104)
ALT SERPL-CCNC: 12 U/L (ref 0–32)
ANION GAP SERPL CALCULATED.3IONS-SCNC: 11 MMOL/L (ref 7–16)
AST SERPL-CCNC: 25 U/L (ref 0–31)
BACTERIA: ABNORMAL /HPF
BASOPHILS ABSOLUTE: 0.03 E9/L (ref 0–0.2)
BASOPHILS RELATIVE PERCENT: 0.4 % (ref 0–2)
BILIRUB SERPL-MCNC: 0.4 MG/DL (ref 0–1.2)
BILIRUBIN URINE: NEGATIVE
BLOOD, URINE: NEGATIVE
BUN BLDV-MCNC: 28 MG/DL (ref 6–23)
CALCIUM SERPL-MCNC: 10 MG/DL (ref 8.6–10.2)
CHLORIDE BLD-SCNC: 103 MMOL/L (ref 98–107)
CHOLESTEROL, FASTING: 248 MG/DL (ref 0–199)
CLARITY: ABNORMAL
CO2: 26 MMOL/L (ref 22–29)
COLOR: YELLOW
CREAT SERPL-MCNC: 1.3 MG/DL (ref 0.5–1)
EOSINOPHILS ABSOLUTE: 0.26 E9/L (ref 0.05–0.5)
EOSINOPHILS RELATIVE PERCENT: 3.1 % (ref 0–6)
EPITHELIAL CELLS, UA: ABNORMAL /HPF
GFR SERPL CREATININE-BSD FRML MDRD: 40 ML/MIN/1.73
GLUCOSE FASTING: 81 MG/DL (ref 74–99)
GLUCOSE URINE: NEGATIVE MG/DL
HCT VFR BLD CALC: 44.5 % (ref 34–48)
HDLC SERPL-MCNC: 50 MG/DL
HEMOGLOBIN: 13.5 G/DL (ref 11.5–15.5)
IMMATURE GRANULOCYTES #: 0.03 E9/L
IMMATURE GRANULOCYTES %: 0.4 % (ref 0–5)
KETONES, URINE: NEGATIVE MG/DL
LDL CHOLESTEROL CALCULATED: 164 MG/DL (ref 0–99)
LEUKOCYTE ESTERASE, URINE: ABNORMAL
LYMPHOCYTES ABSOLUTE: 2.65 E9/L (ref 1.5–4)
LYMPHOCYTES RELATIVE PERCENT: 31.1 % (ref 20–42)
MCH RBC QN AUTO: 29.9 PG (ref 26–35)
MCHC RBC AUTO-ENTMCNC: 30.3 % (ref 32–34.5)
MCV RBC AUTO: 98.5 FL (ref 80–99.9)
MONOCYTES ABSOLUTE: 0.73 E9/L (ref 0.1–0.95)
MONOCYTES RELATIVE PERCENT: 8.6 % (ref 2–12)
NEUTROPHILS ABSOLUTE: 4.82 E9/L (ref 1.8–7.3)
NEUTROPHILS RELATIVE PERCENT: 56.4 % (ref 43–80)
NITRITE, URINE: NEGATIVE
PDW BLD-RTO: 14.6 FL (ref 11.5–15)
PH UA: 6 (ref 5–9)
PLATELET # BLD: 231 E9/L (ref 130–450)
PMV BLD AUTO: 9.8 FL (ref 7–12)
POTASSIUM SERPL-SCNC: 4.2 MMOL/L (ref 3.5–5)
PROTEIN UA: NEGATIVE MG/DL
RBC # BLD: 4.52 E12/L (ref 3.5–5.5)
RBC UA: ABNORMAL /HPF (ref 0–2)
SODIUM BLD-SCNC: 140 MMOL/L (ref 132–146)
SPECIFIC GRAVITY UA: <=1.005 (ref 1–1.03)
TOTAL PROTEIN: 6.8 G/DL (ref 6.4–8.3)
TRIGLYCERIDE, FASTING: 168 MG/DL (ref 0–149)
TSH SERPL DL<=0.05 MIU/L-ACNC: 3.09 UIU/ML (ref 0.27–4.2)
UROBILINOGEN, URINE: 0.2 E.U./DL
VLDLC SERPL CALC-MCNC: 34 MG/DL
WBC # BLD: 8.5 E9/L (ref 4.5–11.5)
WBC UA: ABNORMAL /HPF (ref 0–5)
YEAST: PRESENT /HPF

## 2023-03-13 ENCOUNTER — OFFICE VISIT (OUTPATIENT)
Dept: FAMILY MEDICINE CLINIC | Age: 88
End: 2023-03-13
Payer: MEDICARE

## 2023-03-13 VITALS
WEIGHT: 139.1 LBS | DIASTOLIC BLOOD PRESSURE: 74 MMHG | HEART RATE: 64 BPM | HEIGHT: 62 IN | OXYGEN SATURATION: 98 % | SYSTOLIC BLOOD PRESSURE: 132 MMHG | TEMPERATURE: 97.4 F | BODY MASS INDEX: 25.6 KG/M2

## 2023-03-13 DIAGNOSIS — Z00.00 MEDICARE ANNUAL WELLNESS VISIT, SUBSEQUENT: Primary | ICD-10-CM

## 2023-03-13 DIAGNOSIS — E03.8 OTHER SPECIFIED HYPOTHYROIDISM: ICD-10-CM

## 2023-03-13 DIAGNOSIS — I10 ESSENTIAL HYPERTENSION: ICD-10-CM

## 2023-03-13 DIAGNOSIS — Z23 NEED FOR PROPHYLACTIC VACCINATION AND INOCULATION AGAINST VARICELLA: ICD-10-CM

## 2023-03-13 PROCEDURE — G0439 PPPS, SUBSEQ VISIT: HCPCS | Performed by: INTERNAL MEDICINE

## 2023-03-13 PROCEDURE — G8484 FLU IMMUNIZE NO ADMIN: HCPCS | Performed by: INTERNAL MEDICINE

## 2023-03-13 PROCEDURE — 1123F ACP DISCUSS/DSCN MKR DOCD: CPT | Performed by: INTERNAL MEDICINE

## 2023-03-13 RX ORDER — LEVOTHYROXINE SODIUM 0.05 MG/1
50 TABLET ORAL NIGHTLY
Qty: 90 TABLET | Refills: 0 | Status: SHIPPED | OUTPATIENT
Start: 2023-03-13 | End: 2023-06-11

## 2023-03-13 RX ORDER — CLONIDINE HYDROCHLORIDE 0.1 MG/1
0.1 TABLET ORAL 2 TIMES DAILY
Qty: 180 TABLET | Refills: 0 | Status: SHIPPED | OUTPATIENT
Start: 2023-03-13

## 2023-03-13 RX ORDER — LOSARTAN POTASSIUM 50 MG/1
50 TABLET ORAL DAILY
Qty: 90 TABLET | Refills: 0 | Status: SHIPPED | OUTPATIENT
Start: 2023-03-13

## 2023-03-13 RX ORDER — TRIAMTERENE AND HYDROCHLOROTHIAZIDE 37.5; 25 MG/1; MG/1
1 TABLET ORAL DAILY
Qty: 90 TABLET | Refills: 0 | Status: SHIPPED | OUTPATIENT
Start: 2023-03-13

## 2023-03-13 SDOH — ECONOMIC STABILITY: FOOD INSECURITY: WITHIN THE PAST 12 MONTHS, YOU WORRIED THAT YOUR FOOD WOULD RUN OUT BEFORE YOU GOT MONEY TO BUY MORE.: NEVER TRUE

## 2023-03-13 SDOH — ECONOMIC STABILITY: INCOME INSECURITY: HOW HARD IS IT FOR YOU TO PAY FOR THE VERY BASICS LIKE FOOD, HOUSING, MEDICAL CARE, AND HEATING?: NOT VERY HARD

## 2023-03-13 SDOH — ECONOMIC STABILITY: HOUSING INSECURITY
IN THE LAST 12 MONTHS, WAS THERE A TIME WHEN YOU DID NOT HAVE A STEADY PLACE TO SLEEP OR SLEPT IN A SHELTER (INCLUDING NOW)?: NO

## 2023-03-13 SDOH — ECONOMIC STABILITY: FOOD INSECURITY: WITHIN THE PAST 12 MONTHS, THE FOOD YOU BOUGHT JUST DIDN'T LAST AND YOU DIDN'T HAVE MONEY TO GET MORE.: NEVER TRUE

## 2023-03-13 ASSESSMENT — PATIENT HEALTH QUESTIONNAIRE - PHQ9
SUM OF ALL RESPONSES TO PHQ QUESTIONS 1-9: 0
1. LITTLE INTEREST OR PLEASURE IN DOING THINGS: 0
2. FEELING DOWN, DEPRESSED OR HOPELESS: 0
SUM OF ALL RESPONSES TO PHQ9 QUESTIONS 1 & 2: 0

## 2023-03-13 ASSESSMENT — LIFESTYLE VARIABLES
HOW OFTEN DO YOU HAVE A DRINK CONTAINING ALCOHOL: NEVER
HOW MANY STANDARD DRINKS CONTAINING ALCOHOL DO YOU HAVE ON A TYPICAL DAY: PATIENT DOES NOT DRINK

## 2023-03-13 NOTE — PATIENT INSTRUCTIONS
Advance Directives: Care Instructions  Overview  An advance directive is a legal way to state your wishes at the end of your life. It tells your family and your doctor what to do if you can't say what you want. There are two main types of advance directives. You can change them any time your wishes change. Living will. This form tells your family and your doctor your wishes about life support and other treatment. The form is also called a declaration. Medical power of . This form lets you name a person to make treatment decisions for you when you can't speak for yourself. This person is called a health care agent (health care proxy, health care surrogate). The form is also called a durable power of  for health care. If you do not have an advance directive, decisions about your medical care may be made by a family member, or by a doctor or a  who doesn't know you. It may help to think of an advance directive as a gift to the people who care for you. If you have one, they won't have to make tough decisions by themselves. For more information, including forms for your state, see the [address] website (www.caringinfo.org/planning/advance-directives/). Follow-up care is a key part of your treatment and safety. Be sure to make and go to all appointments, and call your doctor if you are having problems. It's also a good idea to know your test results and keep a list of the medicines you take. What should you include in an advance directive? Many states have a unique advance directive form. (It may ask you to address specific issues.) Or you might use a universal form that's approved by many states. If your form doesn't tell you what to address, it may be hard to know what to include in your advance directive. Use the questions below to help you get started. Who do you want to make decisions about your medical care if you are not able to?   What life-support measures do you want if you have a serious illness that gets worse over time or can't be cured? What are you most afraid of that might happen? (Maybe you're afraid of having pain, losing your independence, or being kept alive by machines.)  Where would you prefer to die? (Your home? A hospital? A nursing home?)  Do you want to donate your organs when you die? Do you want certain Jain practices performed before you die? When should you call for help? Be sure to contact your doctor if you have any questions. Where can you learn more? Go to http://www.harris.com/ and enter R264 to learn more about \"Advance Directives: Care Instructions. \"  Current as of: June 16, 2022               Content Version: 13.5  © 2006-2022 [employer]. Care instructions adapted under license by [facility] ([facility]). If you have questions about a medical condition or this instruction, always ask your healthcare professional. [place] [address] any warranty or liability for your use of this information. A Healthy Heart: Care Instructions  Your Care Instructions     Coronary artery disease, also called heart disease, occurs when a substance called plaque builds up in the vessels that supply oxygen-rich blood to your heart muscle. This can narrow the blood vessels and reduce blood flow. A heart attack happens when blood flow is completely blocked. A high-fat diet, smoking, and other factors increase the risk of heart disease. Your doctor has found that you have a chance of having heart disease. You can do lots of things to keep your heart healthy. It may not be easy, but you can change your diet, exercise more, and quit smoking. These steps really work to lower your chance of heart disease. Follow-up care is a key part of your treatment and safety. Be sure to make and go to all appointments, and call your doctor if you are having problems.  It's also a good idea to know your test results and keep a list of the medicines you take. How can you care for yourself at home? Diet    Use less salt when you cook and eat. This helps lower your blood pressure. Taste food before salting. Add only a little salt when you think you need it. With time, your taste buds will adjust to less salt.     Eat fewer snack items, fast foods, canned soups, and other high-salt, high-fat, processed foods.     Read food labels and try to avoid saturated and trans fats. They increase your risk of heart disease by raising cholesterol levels.     Limit the amount of solid fat-butter, margarine, and shortening-you eat. Use olive, peanut, or canola oil when you cook. Bake, broil, and steam foods instead of frying them.     Eat a variety of fruit and vegetables every day. Dark green, deep orange, red, or yellow fruits and vegetables are especially good for you. Examples include spinach, carrots, peaches, and berries.     Foods high in fiber can reduce your cholesterol and provide important vitamins and minerals. High-fiber foods include whole-grain cereals and breads, oatmeal, beans, brown rice, citrus fruits, and apples.     Eat lean proteins. Heart-healthy proteins include seafood, lean meats and poultry, eggs, beans, peas, nuts, seeds, and soy products.     Limit drinks and foods with added sugar. These include candy, desserts, and soda pop. Lifestyle changes    If your doctor recommends it, get more exercise. Walking is a good choice. Bit by bit, increase the amount you walk every day. Try for at least 30 minutes on most days of the week. You also may want to swim, bike, or do other activities.     Do not smoke. If you need help quitting, talk to your doctor about stop-smoking programs and medicines. These can increase your chances of quitting for good. Quitting smoking may be the most important step you can take to protect your heart. It is never too late to quit.     Limit alcohol to 2 drinks a day for men and 1 drink a day for women.  Too much alcohol can cause health problems.     Manage other health problems such as diabetes, high blood pressure, and high cholesterol. If you think you may have a problem with alcohol or drug use, talk to your doctor. Medicines    Take your medicines exactly as prescribed. Call your doctor if you think you are having a problem with your medicine.     If your doctor recommends aspirin, take the amount directed each day. Make sure you take aspirin and not another kind of pain reliever, such as acetaminophen (Tylenol). When should you call for help? Call 911 if you have symptoms of a heart attack. These may include:    Chest pain or pressure, or a strange feeling in the chest.     Sweating.     Shortness of breath.     Pain, pressure, or a strange feeling in the back, neck, jaw, or upper belly or in one or both shoulders or arms.     Lightheadedness or sudden weakness.     A fast or irregular heartbeat. After you call 911, the  may tell you to chew 1 adult-strength or 2 to 4 low-dose aspirin. Wait for an ambulance. Do not try to drive yourself. Watch closely for changes in your health, and be sure to contact your doctor if you have any problems. Where can you learn more? Go to http://www.harris.com/ and enter F075 to learn more about \"A Healthy Heart: Care Instructions. \"  Current as of: September 7, 2022               Content Version: 13.5  © 0710-1265 [employer]. Care instructions adapted under license by [facility] ([facility]). If you have questions about a medical condition or this instruction, always ask your healthcare professional. [place] [address] any warranty or liability for your use of this information. Personalized Preventive Plan for Abran Class - 3/13/2023  Medicare offers a range of preventive health benefits. Some of the tests and screenings are paid in full while other may be subject to a deductible, co-insurance, and/or copay.     Some of these benefits include a comprehensive review of your medical history including lifestyle, illnesses that may run in your family, and various assessments and screenings as appropriate. After reviewing your medical record and screening and assessments performed today your provider may have ordered immunizations, labs, imaging, and/or referrals for you. A list of these orders (if applicable) as well as your Preventive Care list are included within your After Visit Summary for your review. Other Preventive Recommendations:    A preventive eye exam performed by an eye specialist is recommended every 1-2 years to screen for glaucoma; cataracts, macular degeneration, and other eye disorders. A preventive dental visit is recommended every 6 months. Try to get at least 150 minutes of exercise per week or 10,000 steps per day on a pedometer . Order or download the FREE \"Exercise & Physical Activity: Your Everyday Guide\" from [employer]. Call 4-526.837.5025 or search [employer] online. You need 1856-9522 mg of calcium and 1035-5395 IU of vitamin D per day. It is possible to meet your calcium requirement with diet alone, but a vitamin D supplement is usually necessary to meet this goal.  When exposed to the sun, use a sunscreen that protects against both UVA and UVB radiation with an SPF of 30 or greater. Reapply every 2 to 3 hours or after sweating, drying off with a towel, or swimming. Always wear a seat belt when traveling in a car. Always wear a helmet when riding a bicycle or motorcycle.

## 2023-03-13 NOTE — PROGRESS NOTES
Medicare Annual Wellness Visit    Hattie Loaiza is here for Medicare AWV and Discuss Labs (Review labs from 03/09/2023)    Assessment & Plan   Medicare annual wellness visit, subsequent  Essential hypertension  -     cloNIDine (CATAPRES) 0.1 MG tablet; Take 1 tablet by mouth 2 times daily, Disp-180 tablet, R-0Normal  -     losartan (COZAAR) 50 MG tablet; Take 1 tablet by mouth daily, Disp-90 tablet, R-0Normal  -     triamterene-hydroCHLOROthiazide (MAXZIDE-25) 37.5-25 MG per tablet; Take 1 tablet by mouth daily TAKE 1 TABLET EVERY DAY, Disp-90 tablet, R-0Normal  Other specified hypothyroidism  -     levothyroxine (SYNTHROID) 50 MCG tablet; Take 1 tablet by mouth at bedtime, Disp-90 tablet, R-0Normal  Need for prophylactic vaccination and inoculation against varicella  -     zoster recombinant adjuvanted vaccine [facility]) 50 MCG/0.5ML SUSR injection; Inject 0.5 mLs into the muscle once for 1 dose, Disp-0.5 mL, R-0Print    Recommendations for Preventive Services Due: see orders and patient instructions/AVS.  Recommended screening schedule for the next 5-10 years is provided to the patient in written form: see Patient Instructions/AVS.     Return in 1 year (on 3/13/2024) for Medicare Annual Wellness Visit in 1 year, Annual Medicare Wellness Exam.     Subjective       Patient's complete Health Risk Assessment and screening values have been reviewed and are found in Flowsheets. The following problems were reviewed today and where indicated follow up appointments were made and/or referrals ordered. No Positive Risk Factors identified today. Objective   Vitals:    03/13/23 1044   BP: 132/74   Position: Sitting   Pulse: 64   Temp: 97.4 °F (36.3 °C)   TempSrc: Temporal   SpO2: 98%   Weight: 139 lb 1.6 oz (63.1 kg)   Height: 5' 2\" (1.575 m)      Body mass index is 25.44 kg/m².              Allergies   Allergen Reactions    Ciprofloxacin Hcl      Made me sick     Cleocin [Clindamycin Hcl] Hives     BRIGHT RED RASH    Lisinopril Other (See Comments)     Fever chills    Pcn [Penicillins]     Statins Other (See Comments)     Extreme muscle fatigue    Penciclovir Rash     Prior to Visit Medications    Medication Sig Taking?  Authorizing Provider   cloNIDine (CATAPRES) 0.1 MG tablet Take 1 tablet by mouth 2 times daily Yes Kasey Mejias MD   levothyroxine (SYNTHROID) 50 MCG tablet Take 1 tablet by mouth at bedtime Yes Kasey Mejias MD   losartan (COZAAR) 50 MG tablet Take 1 tablet by mouth daily Yes Kasey Mejias MD   triamterene-hydroCHLOROthiazide (MAXZIDE-25) 37.5-25 MG per tablet Take 1 tablet by mouth daily TAKE 1 TABLET EVERY DAY Yes Kasey Mejias MD   zoster recombinant adjuvanted vaccine [facility]) 50 MCG/0.5ML SUSR injection Inject 0.5 mLs into the muscle once for 1 dose Yes Kasey Mejias MD   Calcium-Magnesium-Zinc (JUVE-MAG-ZINC PO) Take by mouth daily Yes Historical Provider, MD   aspirin 81 MG EC tablet Take 81 mg by mouth daily States Stopped 1 week pre op per Dr Nino Qualia instructions Yes Historical Provider, MD   predniSONE (DELTASONE) 5 MG tablet Take 5 mg by mouth daily Yes Historical Provider, MD   Cholecalciferol (VITAMIN D3) 10 MCG (400 UNIT) CAPS Take 1 capsule by mouth daily Yes Historical Provider, MD   vitamin C (ASCORBIC ACID) 500 MG tablet Take 500 mg by mouth daily Yes Historical Provider, MD   B Complex Vitamins (VITAMIN B COMPLEX PO) Daily Yes Historical Provider, MD   Methylsulfonylmethane (MSM) 1000 MG TABS Take 1 tablet by mouth daily  Yes Historical Provider, MD   FOLIC ACID PO Take 1 tablet by mouth daily  Yes Historical Provider, MD   doxycycline hyclate (VIBRAMYCIN) 100 MG capsule Take 100 mg by mouth 2 times daily  Patient not taking: Reported on 3/13/2023  Historical Provider, MD   alendronate (FOSAMAX) 70 MG tablet 70 mg One q week  Patient not taking: Reported on 3/13/2023  Historical Provider, MD   Biotin 79368 MCG TBDP Take by mouth daily  Patient not taking: Reported on 3/13/2023  Historical Provider, MD Pena (Including outside providers/suppliers regularly involved in providing care):   Patient Care Team:  Marcelina Toure MD as PCP - Dakotah Ramos MD as PCP - Empaneled Provider     Reviewed and updated this visit:  Tobacco  Allergies  Meds  Problems  Med Hx  Surg Hx  Soc Hx  Fam Hx               Marcelina Toure MD

## 2023-04-25 ENCOUNTER — TELEPHONE (OUTPATIENT)
Dept: FAMILY MEDICINE CLINIC | Age: 88
End: 2023-04-25

## 2023-04-25 DIAGNOSIS — Z12.39 ENCOUNTER FOR SCREENING FOR MALIGNANT NEOPLASM OF BREAST, UNSPECIFIED SCREENING MODALITY: Primary | ICD-10-CM

## 2023-04-25 NOTE — TELEPHONE ENCOUNTER
Patient called for orders for her routine screening mammogram.  Last seen 3/13/2023  Next appt 6/13/2023

## 2023-05-02 ENCOUNTER — TELEPHONE (OUTPATIENT)
Dept: FAMILY MEDICINE CLINIC | Age: 88
End: 2023-05-02

## 2023-05-02 DIAGNOSIS — Z12.31 ENCOUNTER FOR SCREENING MAMMOGRAM FOR BREAST CANCER: Primary | ICD-10-CM

## 2023-05-02 NOTE — TELEPHONE ENCOUNTER
Patient called she scheduled her mammogram for Saturday 5.6.23. scheduling told the pt the code Z12.39 is not going through with her insurance. She stated it needs to be Z12.31. please change the Dx on the order.   Last seen 3/13/2023  Next appt 6/13/2023

## 2023-05-06 ENCOUNTER — HOSPITAL ENCOUNTER (OUTPATIENT)
Dept: MAMMOGRAPHY | Age: 88
End: 2023-05-06
Payer: MEDICARE

## 2023-05-06 DIAGNOSIS — Z12.39 ENCOUNTER FOR SCREENING FOR MALIGNANT NEOPLASM OF BREAST, UNSPECIFIED SCREENING MODALITY: ICD-10-CM

## 2023-05-06 LAB — MAMMOGRAPHY, EXTERNAL: NEGATIVE

## 2023-05-06 PROCEDURE — 77063 BREAST TOMOSYNTHESIS BI: CPT

## 2023-06-08 LAB
ALBUMIN SERPL-MCNC: 4 G/DL
ALP BLD-CCNC: 86 U/L
ALT SERPL-CCNC: 16 U/L
ANION GAP SERPL CALCULATED.3IONS-SCNC: NORMAL MMOL/L
AST SERPL-CCNC: 23 U/L
BASOPHILS ABSOLUTE: 29 /ΜL
BASOPHILS RELATIVE PERCENT: 0.3 %
BILIRUB SERPL-MCNC: 0.3 MG/DL (ref 0.1–1.4)
BUN BLDV-MCNC: 39 MG/DL
CALCIUM SERPL-MCNC: 9.8 MG/DL
CHLORIDE BLD-SCNC: 104 MMOL/L
CO2: 26 MMOL/L
CREAT SERPL-MCNC: 1.29 MG/DL
EGFR: 40
EOSINOPHILS ABSOLUTE: 86 /ΜL
EOSINOPHILS RELATIVE PERCENT: 0.9 %
GLUCOSE BLD-MCNC: 104 MG/DL
HCT VFR BLD CALC: 42.3 % (ref 36–46)
HEMOGLOBIN: 14.3 G/DL (ref 12–16)
LYMPHOCYTES ABSOLUTE: 1603 /ΜL
LYMPHOCYTES RELATIVE PERCENT: 16.7 %
MCH RBC QN AUTO: 30.7 PG
MCHC RBC AUTO-ENTMCNC: 33.8 G/DL
MCV RBC AUTO: 90.8 FL
MONOCYTES ABSOLUTE: 422 /ΜL
MONOCYTES RELATIVE PERCENT: 4.4 %
NEUTROPHILS ABSOLUTE: 7459 /ΜL
NEUTROPHILS RELATIVE PERCENT: 77.7 %
PLATELET # BLD: 238 K/ΜL
PMV BLD AUTO: 9.3 FL
POTASSIUM SERPL-SCNC: 4.8 MMOL/L
RBC # BLD: 4.66 10^6/ΜL
SODIUM BLD-SCNC: 141 MMOL/L
TOTAL PROTEIN: 6.8
WBC # BLD: 9.6 10^3/ML

## 2023-07-06 DIAGNOSIS — I10 ESSENTIAL HYPERTENSION: ICD-10-CM

## 2023-07-06 RX ORDER — LOSARTAN POTASSIUM 50 MG/1
TABLET ORAL
Qty: 90 TABLET | Refills: 0 | OUTPATIENT
Start: 2023-07-06

## 2023-07-26 DIAGNOSIS — E03.8 OTHER SPECIFIED HYPOTHYROIDISM: ICD-10-CM

## 2023-07-26 RX ORDER — TRIAMTERENE AND HYDROCHLOROTHIAZIDE 37.5; 25 MG/1; MG/1
TABLET ORAL
Qty: 90 TABLET | OUTPATIENT
Start: 2023-07-26

## 2023-07-26 RX ORDER — LEVOTHYROXINE SODIUM 0.05 MG/1
TABLET ORAL
Qty: 90 TABLET | Refills: 0 | OUTPATIENT
Start: 2023-07-26

## 2023-08-29 ENCOUNTER — TELEPHONE (OUTPATIENT)
Dept: FAMILY MEDICINE CLINIC | Age: 88
End: 2023-08-29

## 2023-08-29 DIAGNOSIS — M35.3 PMR (POLYMYALGIA RHEUMATICA) (HCC): Primary | ICD-10-CM

## 2023-08-29 NOTE — TELEPHONE ENCOUNTER
----- Message from Jennifer Maradiaga sent at 8/29/2023  9:58 AM EDT -----  Subject: Message to Provider    QUESTIONS  Information for Provider? Fuad Abdi would like to talk to someone about   adding another bloodtest to the ones she will be getting on 9-21 for her   appt on 9-28. It is a test her rheumatologist does but she would rather   talk to Dr. Cardenas Patient about it. Please contact her about this.  ---------------------------------------------------------------------------  --------------  Adrianne Galeas INFO  [phone]; OK to leave message on voicemail  ---------------------------------------------------------------------------  --------------  SCRIPT ANSWERS  Relationship to Patient?  Self

## 2023-08-29 NOTE — TELEPHONE ENCOUNTER
Per Dr Dusty De La Rosa:   Adenike Flax are ordered. Will discuss with her with next visit       Pt informed and verbalized understanding.

## 2023-08-29 NOTE — TELEPHONE ENCOUNTER
Called pt and pt said she's had to cancel her last 3 appointments with her rheumatologist due to her 's condition. Pt is asking if you'll order the labs she needs and then also prescribe her prednisone.

## 2023-08-31 ENCOUNTER — HOSPITAL ENCOUNTER (EMERGENCY)
Age: 88
Discharge: HOME | End: 2023-08-31
Payer: MEDICARE

## 2023-08-31 VITALS
RESPIRATION RATE: 18 BRPM | OXYGEN SATURATION: 99 % | HEART RATE: 54 BPM | DIASTOLIC BLOOD PRESSURE: 75 MMHG | BODY MASS INDEX: 24.69 KG/M2 | WEIGHT: 135 LBS | TEMPERATURE: 97.8 F | SYSTOLIC BLOOD PRESSURE: 184 MMHG

## 2023-08-31 DIAGNOSIS — H61.22 IMPACTED CERUMEN OF LEFT EAR: Primary | ICD-10-CM

## 2023-08-31 PROCEDURE — 69209 REMOVE IMPACTED EAR WAX UNI: CPT

## 2023-08-31 PROCEDURE — 99211 OFF/OP EST MAY X REQ PHY/QHP: CPT

## 2023-08-31 ASSESSMENT — PAIN - FUNCTIONAL ASSESSMENT: PAIN_FUNCTIONAL_ASSESSMENT: NONE - DENIES PAIN

## 2023-08-31 NOTE — ED NOTES
Left ear flushed with little to no wax removed. Pt tolerated well.       Adin Rodriguez LPN  50/80/09 [id number]

## 2023-09-05 ENCOUNTER — TELEPHONE (OUTPATIENT)
Dept: FAMILY MEDICINE CLINIC | Age: 88
End: 2023-09-05

## 2023-09-05 NOTE — TELEPHONE ENCOUNTER
Patient called this morning to say her BP was 181/91. She thinks it's up because of anxiety issues with her 's health. She is asking for something to clam her down a bit. She is taking  to [facility] today at 1:15. Will not be back home till 4:15 please call cell phone number is [phone]. Please advise.      Last seen 6/13/2023  Next appt 9/28/2023     [facility] [address]

## 2023-09-05 NOTE — TELEPHONE ENCOUNTER
MD Negra Milligan MA  Caller: Unspecified (Today,  9:24 AM)  Can we do OV/Virtual visit?      Called patient and Left a detailed message I have a cancellation tomorrow at 2:00 I will hold it for her and asked that she return my call

## 2023-09-06 ENCOUNTER — OFFICE VISIT (OUTPATIENT)
Dept: FAMILY MEDICINE CLINIC | Age: 88
End: 2023-09-06
Payer: MEDICARE

## 2023-09-06 VITALS
HEART RATE: 79 BPM | OXYGEN SATURATION: 97 % | SYSTOLIC BLOOD PRESSURE: 155 MMHG | BODY MASS INDEX: 25.12 KG/M2 | DIASTOLIC BLOOD PRESSURE: 81 MMHG | HEIGHT: 62 IN | WEIGHT: 136.5 LBS | TEMPERATURE: 97.6 F

## 2023-09-06 DIAGNOSIS — E03.8 OTHER SPECIFIED HYPOTHYROIDISM: ICD-10-CM

## 2023-09-06 DIAGNOSIS — N18.32 STAGE 3B CHRONIC KIDNEY DISEASE (HCC): ICD-10-CM

## 2023-09-06 DIAGNOSIS — I10 ESSENTIAL HYPERTENSION: Primary | ICD-10-CM

## 2023-09-06 DIAGNOSIS — M35.3 PMR (POLYMYALGIA RHEUMATICA) (HCC): ICD-10-CM

## 2023-09-06 DIAGNOSIS — F41.9 ANXIETY: ICD-10-CM

## 2023-09-06 DIAGNOSIS — E78.49 OTHER HYPERLIPIDEMIA: ICD-10-CM

## 2023-09-06 PROBLEM — I77.9 DISORDER OF CAROTID ARTERY (HCC): Status: RESOLVED | Noted: 2021-11-19 | Resolved: 2023-09-06

## 2023-09-06 PROCEDURE — G8427 DOCREV CUR MEDS BY ELIG CLIN: HCPCS | Performed by: INTERNAL MEDICINE

## 2023-09-06 PROCEDURE — 1090F PRES/ABSN URINE INCON ASSESS: CPT | Performed by: INTERNAL MEDICINE

## 2023-09-06 PROCEDURE — 99214 OFFICE O/P EST MOD 30 MIN: CPT | Performed by: INTERNAL MEDICINE

## 2023-09-06 PROCEDURE — 1036F TOBACCO NON-USER: CPT | Performed by: INTERNAL MEDICINE

## 2023-09-06 PROCEDURE — G8420 CALC BMI NORM PARAMETERS: HCPCS | Performed by: INTERNAL MEDICINE

## 2023-09-06 PROCEDURE — 1123F ACP DISCUSS/DSCN MKR DOCD: CPT | Performed by: INTERNAL MEDICINE

## 2023-09-06 RX ORDER — ESCITALOPRAM OXALATE 5 MG/1
5 TABLET ORAL DAILY
Qty: 30 TABLET | Refills: 2 | Status: SHIPPED | OUTPATIENT
Start: 2023-09-06

## 2023-09-06 RX ORDER — LOSARTAN POTASSIUM 50 MG/1
50 TABLET ORAL 2 TIMES DAILY
Qty: 180 TABLET | Refills: 0 | Status: SHIPPED | OUTPATIENT
Start: 2023-09-06

## 2023-09-06 ASSESSMENT — ENCOUNTER SYMPTOMS
EYE DISCHARGE: 0
SINUS PAIN: 0
SHORTNESS OF BREATH: 0
SORE THROAT: 0
EYE PAIN: 0
ABDOMINAL PAIN: 0
NAUSEA: 0
BLOOD IN STOOL: 0

## 2023-09-06 NOTE — PATIENT INSTRUCTIONS
The medication list included in this document is our record of what you are currently taking, including any changes that were made at today's visit. If you find any differences when compared to your medications at home, or have any questions that were not answered at your visit, please contact the office.     Monitor BP and Keep a log, Bring it with next visit

## 2023-09-08 DIAGNOSIS — N18.32 STAGE 3B CHRONIC KIDNEY DISEASE (HCC): ICD-10-CM

## 2023-09-08 DIAGNOSIS — M35.3 PMR (POLYMYALGIA RHEUMATICA) (HCC): ICD-10-CM

## 2023-09-08 DIAGNOSIS — I10 ESSENTIAL HYPERTENSION: ICD-10-CM

## 2023-09-08 LAB
ABSOLUTE IMMATURE GRANULOCYTE: 0.03 K/UL (ref 0–0.58)
ALBUMIN SERPL-MCNC: 4.6 G/DL (ref 3.5–5.2)
ALP BLD-CCNC: 120 U/L (ref 35–104)
ALT SERPL-CCNC: 15 U/L (ref 0–32)
ANION GAP SERPL CALCULATED.3IONS-SCNC: 10 MMOL/L (ref 7–16)
AST SERPL-CCNC: 24 U/L (ref 0–31)
BASOPHILS ABSOLUTE: 0.04 K/UL (ref 0–0.2)
BASOPHILS RELATIVE PERCENT: 0 % (ref 0–2)
BILIRUB SERPL-MCNC: 0.4 MG/DL (ref 0–1.2)
BUN BLDV-MCNC: 26 MG/DL (ref 6–23)
C-REACTIVE PROTEIN: <3 MG/L (ref 0–5)
CALCIUM SERPL-MCNC: 10.7 MG/DL (ref 8.6–10.2)
CHLORIDE BLD-SCNC: 99 MMOL/L (ref 98–107)
CO2: 30 MMOL/L (ref 22–29)
CREAT SERPL-MCNC: 1.2 MG/DL (ref 0.5–1)
EOSINOPHILS ABSOLUTE: 0.31 K/UL (ref 0.05–0.5)
EOSINOPHILS RELATIVE PERCENT: 3 % (ref 0–6)
GFR SERPL CREATININE-BSD FRML MDRD: 46 ML/MIN/1.73M2
GLUCOSE FASTING: 93 MG/DL (ref 74–99)
HCT VFR BLD CALC: 49.4 % (ref 34–48)
HEMOGLOBIN: 15.6 G/DL (ref 11.5–15.5)
IMMATURE GRANULOCYTES: 0 % (ref 0–5)
LYMPHOCYTES ABSOLUTE: 3.07 K/UL (ref 1.5–4)
LYMPHOCYTES RELATIVE PERCENT: 31 % (ref 20–42)
MCH RBC QN AUTO: 30.6 PG (ref 26–35)
MCHC RBC AUTO-ENTMCNC: 31.6 G/DL (ref 32–34.5)
MCV RBC AUTO: 97.1 FL (ref 80–99.9)
MONOCYTES ABSOLUTE: 0.8 K/UL (ref 0.1–0.95)
MONOCYTES RELATIVE PERCENT: 8 % (ref 2–12)
NEUTROPHILS ABSOLUTE: 5.75 K/UL (ref 1.8–7.3)
NEUTROPHILS RELATIVE PERCENT: 58 % (ref 43–80)
PDW BLD-RTO: 13.3 % (ref 11.5–15)
PLATELET # BLD: 268 K/UL (ref 130–450)
PMV BLD AUTO: 10.3 FL (ref 7–12)
POTASSIUM SERPL-SCNC: 4.8 MMOL/L (ref 3.5–5)
RBC # BLD: 5.09 M/UL (ref 3.5–5.5)
SEDIMENTATION RATE, ERYTHROCYTE: 3 MM/HR (ref 0–20)
SODIUM BLD-SCNC: 139 MMOL/L (ref 132–146)
TOTAL PROTEIN: 7.9 G/DL (ref 6.4–8.3)
WBC # BLD: 10 K/UL (ref 4.5–11.5)

## 2023-09-28 ENCOUNTER — OFFICE VISIT (OUTPATIENT)
Dept: FAMILY MEDICINE CLINIC | Age: 88
End: 2023-09-28
Payer: MEDICARE

## 2023-09-28 VITALS
HEART RATE: 70 BPM | HEIGHT: 62 IN | TEMPERATURE: 97.5 F | WEIGHT: 135.6 LBS | BODY MASS INDEX: 24.95 KG/M2 | DIASTOLIC BLOOD PRESSURE: 60 MMHG | SYSTOLIC BLOOD PRESSURE: 124 MMHG | OXYGEN SATURATION: 97 %

## 2023-09-28 DIAGNOSIS — N18.31 STAGE 3A CHRONIC KIDNEY DISEASE (HCC): ICD-10-CM

## 2023-09-28 DIAGNOSIS — E78.49 OTHER HYPERLIPIDEMIA: ICD-10-CM

## 2023-09-28 DIAGNOSIS — M35.3 PMR (POLYMYALGIA RHEUMATICA) (HCC): ICD-10-CM

## 2023-09-28 DIAGNOSIS — E03.8 OTHER SPECIFIED HYPOTHYROIDISM: ICD-10-CM

## 2023-09-28 DIAGNOSIS — I10 ESSENTIAL HYPERTENSION: Primary | ICD-10-CM

## 2023-09-28 PROCEDURE — G8427 DOCREV CUR MEDS BY ELIG CLIN: HCPCS | Performed by: INTERNAL MEDICINE

## 2023-09-28 PROCEDURE — 1036F TOBACCO NON-USER: CPT | Performed by: INTERNAL MEDICINE

## 2023-09-28 PROCEDURE — 99214 OFFICE O/P EST MOD 30 MIN: CPT | Performed by: INTERNAL MEDICINE

## 2023-09-28 PROCEDURE — 1090F PRES/ABSN URINE INCON ASSESS: CPT | Performed by: INTERNAL MEDICINE

## 2023-09-28 PROCEDURE — 1123F ACP DISCUSS/DSCN MKR DOCD: CPT | Performed by: INTERNAL MEDICINE

## 2023-09-28 PROCEDURE — G8420 CALC BMI NORM PARAMETERS: HCPCS | Performed by: INTERNAL MEDICINE

## 2023-09-28 RX ORDER — LEVOTHYROXINE SODIUM 0.05 MG/1
50 TABLET ORAL NIGHTLY
Qty: 90 TABLET | Refills: 0 | Status: SHIPPED | OUTPATIENT
Start: 2023-09-28 | End: 2023-12-27

## 2023-09-28 RX ORDER — CLONIDINE HYDROCHLORIDE 0.1 MG/1
0.1 TABLET ORAL 2 TIMES DAILY
Qty: 180 TABLET | Refills: 0 | Status: SHIPPED | OUTPATIENT
Start: 2023-09-28

## 2023-09-28 ASSESSMENT — ENCOUNTER SYMPTOMS
SINUS PAIN: 0
EYE DISCHARGE: 0
NAUSEA: 0
BLOOD IN STOOL: 0
SHORTNESS OF BREATH: 0
EYE PAIN: 0
SORE THROAT: 0
ABDOMINAL PAIN: 0

## 2023-10-01 DIAGNOSIS — I10 ESSENTIAL HYPERTENSION: ICD-10-CM

## 2023-10-02 RX ORDER — CLONIDINE HYDROCHLORIDE 0.1 MG/1
0.1 TABLET ORAL 2 TIMES DAILY
Qty: 180 TABLET | Refills: 0 | OUTPATIENT
Start: 2023-10-02

## 2023-10-20 ENCOUNTER — PATIENT MESSAGE (OUTPATIENT)
Dept: FAMILY MEDICINE CLINIC | Age: 88
End: 2023-10-20

## 2023-10-20 NOTE — TELEPHONE ENCOUNTER
From: Sasha Tate  To: Dr. Long Monroe: 10/20/2023 10:47 AM EDT  Subject: Renewal of handicap placard    Received a renewal notice that requires a Rx. Perhaps I could just pick it up at the desk. Also, received a letter from [facility] concerning a Rx for MAURILIO 1MG TAB that they do not have approval for. Assume this is the 1mg Prednisone. Is Maurilio a brand name rather than generic that they don't approve?     Houston Aldana

## 2023-10-24 NOTE — TELEPHONE ENCOUNTER
Called and spoke to Denilson at [facility] regarding the letter recv'd by the patient. He stated the patient is not due yet for a refill and she still has 2 refills left on the current script. I asked what the letter was regarding he said he doesn't show any letters sent. Called back patient and informed her of above she said she is not sure what the letter ment maybe it was that she was to early to be refilled.  She stated she will get the remaining refills then have Dr. Ayan Arriaga continue to fill it

## 2023-11-19 DIAGNOSIS — I10 ESSENTIAL HYPERTENSION: ICD-10-CM

## 2023-11-20 RX ORDER — LOSARTAN POTASSIUM 50 MG/1
TABLET ORAL
Qty: 180 TABLET | Refills: 0 | Status: SHIPPED | OUTPATIENT
Start: 2023-11-20

## 2023-12-10 DIAGNOSIS — I10 ESSENTIAL HYPERTENSION: ICD-10-CM

## 2023-12-10 DIAGNOSIS — E03.8 OTHER SPECIFIED HYPOTHYROIDISM: ICD-10-CM

## 2023-12-11 RX ORDER — CLONIDINE HYDROCHLORIDE 0.1 MG/1
0.1 TABLET ORAL 2 TIMES DAILY
Qty: 180 TABLET | Refills: 3 | OUTPATIENT
Start: 2023-12-11

## 2023-12-11 RX ORDER — LEVOTHYROXINE SODIUM 0.05 MG/1
50 TABLET ORAL NIGHTLY
Qty: 90 TABLET | Refills: 3 | OUTPATIENT
Start: 2023-12-11

## 2024-01-06 DIAGNOSIS — I10 ESSENTIAL HYPERTENSION: ICD-10-CM

## 2024-01-08 ENCOUNTER — OFFICE VISIT (OUTPATIENT)
Dept: FAMILY MEDICINE CLINIC | Age: 89
End: 2024-01-08
Payer: MEDICARE

## 2024-01-08 VITALS
OXYGEN SATURATION: 96 % | SYSTOLIC BLOOD PRESSURE: 160 MMHG | TEMPERATURE: 97.8 F | HEART RATE: 55 BPM | HEIGHT: 62 IN | DIASTOLIC BLOOD PRESSURE: 80 MMHG | BODY MASS INDEX: 26.06 KG/M2 | WEIGHT: 141.6 LBS

## 2024-01-08 DIAGNOSIS — I10 ESSENTIAL HYPERTENSION: ICD-10-CM

## 2024-01-08 DIAGNOSIS — E03.8 OTHER SPECIFIED HYPOTHYROIDISM: ICD-10-CM

## 2024-01-08 DIAGNOSIS — N18.32 STAGE 3B CHRONIC KIDNEY DISEASE (HCC): Primary | ICD-10-CM

## 2024-01-08 DIAGNOSIS — M35.3 PMR (POLYMYALGIA RHEUMATICA) (HCC): ICD-10-CM

## 2024-01-08 DIAGNOSIS — E78.49 OTHER HYPERLIPIDEMIA: ICD-10-CM

## 2024-01-08 PROCEDURE — G8417 CALC BMI ABV UP PARAM F/U: HCPCS | Performed by: INTERNAL MEDICINE

## 2024-01-08 PROCEDURE — 1090F PRES/ABSN URINE INCON ASSESS: CPT | Performed by: INTERNAL MEDICINE

## 2024-01-08 PROCEDURE — 1123F ACP DISCUSS/DSCN MKR DOCD: CPT | Performed by: INTERNAL MEDICINE

## 2024-01-08 PROCEDURE — G8484 FLU IMMUNIZE NO ADMIN: HCPCS | Performed by: INTERNAL MEDICINE

## 2024-01-08 PROCEDURE — 99214 OFFICE O/P EST MOD 30 MIN: CPT | Performed by: INTERNAL MEDICINE

## 2024-01-08 PROCEDURE — 1036F TOBACCO NON-USER: CPT | Performed by: INTERNAL MEDICINE

## 2024-01-08 PROCEDURE — G8427 DOCREV CUR MEDS BY ELIG CLIN: HCPCS | Performed by: INTERNAL MEDICINE

## 2024-01-08 RX ORDER — LEVOTHYROXINE SODIUM 0.05 MG/1
50 TABLET ORAL NIGHTLY
Qty: 90 TABLET | Refills: 1 | Status: SHIPPED | OUTPATIENT
Start: 2024-01-08

## 2024-01-08 RX ORDER — CLONIDINE HYDROCHLORIDE 0.1 MG/1
0.1 TABLET ORAL 2 TIMES DAILY
Qty: 180 TABLET | Refills: 3 | OUTPATIENT
Start: 2024-01-08

## 2024-01-08 RX ORDER — PREDNISONE 1 MG/1
1 TABLET ORAL DAILY
COMMUNITY
Start: 2023-11-02

## 2024-01-08 RX ORDER — LOSARTAN POTASSIUM 50 MG/1
TABLET ORAL
Qty: 180 TABLET | Refills: 1 | Status: SHIPPED | OUTPATIENT
Start: 2024-01-08

## 2024-01-08 RX ORDER — CLONIDINE HYDROCHLORIDE 0.1 MG/1
0.1 TABLET ORAL 3 TIMES DAILY
Qty: 270 TABLET | Refills: 0 | Status: SHIPPED | OUTPATIENT
Start: 2024-01-08 | End: 2024-04-07

## 2024-01-08 ASSESSMENT — ENCOUNTER SYMPTOMS
SHORTNESS OF BREATH: 0
ABDOMINAL PAIN: 0
BLOOD IN STOOL: 0
EYE PAIN: 0
SINUS PAIN: 0
SORE THROAT: 0
EYE DISCHARGE: 0
NAUSEA: 0

## 2024-01-08 ASSESSMENT — PATIENT HEALTH QUESTIONNAIRE - PHQ9
SUM OF ALL RESPONSES TO PHQ QUESTIONS 1-9: 0
SUM OF ALL RESPONSES TO PHQ9 QUESTIONS 1 & 2: 0
2. FEELING DOWN, DEPRESSED OR HOPELESS: 0
SUM OF ALL RESPONSES TO PHQ QUESTIONS 1-9: 0
1. LITTLE INTEREST OR PLEASURE IN DOING THINGS: 0

## 2024-01-08 NOTE — PATIENT INSTRUCTIONS
The medication list included in this document is our record of what you are currently taking, including any changes that were made at today's visit.  If you find any differences when compared to your medications at home, or have any questions that were not answered at your visit, please contact the office.    Advise to have ( Fasting) lab test prior to next visit.

## 2024-01-08 NOTE — PROGRESS NOTES
Chief Complaint   Patient presents with    Hypertension     Pt here for follow up on hypertension, pt reports feeling good      Hypothyroidism     Follow up     Medication Refill    Health Maintenance     AWV- due, Flu vaccine - pt declined        HPI:  Patient is here for follow-up     Her  passed away Oct last yr    Allergy and Medications are reviewed and updated.  Past Medical History, Surgical History, and Family History has been reviewed and updated.    Review of Systems:  Review of Systems   Constitutional:  Negative for chills and fever.   HENT:  Negative for congestion, sinus pain and sore throat.    Eyes:  Negative for pain and discharge.   Respiratory:  Negative for shortness of breath (No new SOb).    Cardiovascular:  Negative for chest pain.   Gastrointestinal:  Negative for abdominal pain, blood in stool and nausea.   Genitourinary:  Negative for flank pain and frequency.   Musculoskeletal:  Negative for neck pain.   Hematological:  Does not bruise/bleed easily.   Psychiatric/Behavioral:  Negative for suicidal ideas.          Vitals:    01/08/24 0959 01/08/24 1004   BP: (!) 156/80 (!) 160/80   Position: Sitting Sitting   Pulse: 55    Temp: 97.8 °F (36.6 °C)    TempSrc: Temporal    SpO2: 96%    Weight: 64.2 kg (141 lb 9.6 oz)    Height: 1.575 m (5' 2\")        Physical Exam  Vitals reviewed.   Constitutional:       Appearance: She is well-developed.   HENT:      Head: Normocephalic and atraumatic.      Nose: Nose normal.   Eyes:      Conjunctiva/sclera: Conjunctivae normal.      Pupils: Pupils are equal, round, and reactive to light.   Neck:      Vascular: No JVD.   Cardiovascular:      Rate and Rhythm: Normal rate and regular rhythm.   Pulmonary:      Effort: Pulmonary effort is normal.      Breath sounds: Normal breath sounds.   Abdominal:      General: Bowel sounds are normal.      Palpations: Abdomen is soft.   Musculoskeletal:         General: Normal range of motion.   Skin:     General: Skin

## 2024-01-22 ENCOUNTER — OFFICE VISIT (OUTPATIENT)
Dept: FAMILY MEDICINE CLINIC | Age: 89
End: 2024-01-22
Payer: MEDICARE

## 2024-01-22 VITALS
DIASTOLIC BLOOD PRESSURE: 78 MMHG | WEIGHT: 141.1 LBS | TEMPERATURE: 97.5 F | HEART RATE: 51 BPM | OXYGEN SATURATION: 96 % | HEIGHT: 62 IN | SYSTOLIC BLOOD PRESSURE: 158 MMHG | BODY MASS INDEX: 25.96 KG/M2

## 2024-01-22 DIAGNOSIS — E78.49 OTHER HYPERLIPIDEMIA: ICD-10-CM

## 2024-01-22 DIAGNOSIS — I10 ESSENTIAL HYPERTENSION: Primary | ICD-10-CM

## 2024-01-22 DIAGNOSIS — E03.8 OTHER SPECIFIED HYPOTHYROIDISM: ICD-10-CM

## 2024-01-22 DIAGNOSIS — N18.31 STAGE 3A CHRONIC KIDNEY DISEASE (HCC): ICD-10-CM

## 2024-01-22 PROCEDURE — 1123F ACP DISCUSS/DSCN MKR DOCD: CPT | Performed by: INTERNAL MEDICINE

## 2024-01-22 PROCEDURE — G8427 DOCREV CUR MEDS BY ELIG CLIN: HCPCS | Performed by: INTERNAL MEDICINE

## 2024-01-22 PROCEDURE — G8417 CALC BMI ABV UP PARAM F/U: HCPCS | Performed by: INTERNAL MEDICINE

## 2024-01-22 PROCEDURE — 99214 OFFICE O/P EST MOD 30 MIN: CPT | Performed by: INTERNAL MEDICINE

## 2024-01-22 PROCEDURE — 1036F TOBACCO NON-USER: CPT | Performed by: INTERNAL MEDICINE

## 2024-01-22 PROCEDURE — 1090F PRES/ABSN URINE INCON ASSESS: CPT | Performed by: INTERNAL MEDICINE

## 2024-01-22 PROCEDURE — G8484 FLU IMMUNIZE NO ADMIN: HCPCS | Performed by: INTERNAL MEDICINE

## 2024-01-22 RX ORDER — HYDRALAZINE HYDROCHLORIDE 25 MG/1
25 TABLET, FILM COATED ORAL 2 TIMES DAILY
Qty: 180 TABLET | Refills: 0 | Status: SHIPPED | OUTPATIENT
Start: 2024-01-22

## 2024-01-22 ASSESSMENT — ENCOUNTER SYMPTOMS
SINUS PAIN: 0
SORE THROAT: 0
BLOOD IN STOOL: 0
ABDOMINAL PAIN: 0
NAUSEA: 0
SHORTNESS OF BREATH: 0
EYE PAIN: 0
EYE DISCHARGE: 0

## 2024-01-22 NOTE — PROGRESS NOTES
Myalgia 02/21/2018    PMR (polymyalgia rheumatica) ([facility]) 02/21/2018    Arthropathy of cervical facet joint 02/21/2018    Arthralgia of right hip 03/07/2016    Cerebral infarction ([facility])      Overview Note:     Replacing Inactive Diagnoses      History of TIA (transient ischemic attack) 07/20/2015    Right cataract 02/04/2014    Left cataract 08/27/2013    Sprain of acromioclavicular ligament 06/04/2012     Overview Note:     replace inactive diagnosis      Shoulder pain 06/04/2012    Trochanteric bursitis 11/01/2011    Hip pain 11/01/2011        Diagnosis:   1. Essential hypertension  -     hydrALAZINE (APRESOLINE) 25 MG tablet; Take 1 tablet by mouth in the morning and at bedtime, Disp-180 tablet, R-0Normal  -     Amb External Referral To Nephrology  2. Other specified hypothyroidism  3. Other hyperlipidemia  4. Stage 3a chronic kidney disease (HCC)  -     Amb External Referral To Nephrology         PLAN:           Adv to continue Losartan 50 mg bid  Clonidine 0.1 mg tid    Add Hydralzine 25 mg bid    Monitor BP and Keep a log, Bring it with next visit    Pt is stable on current medical treatment.   Continue current treatment plan    Side effects/Adverse effects/Precautions are reviewed with patient.     Low salt, Low Carb diet an low fat diet  Continue medications as advised and take them regularly  Regular exercises as advised    Discussed natural and expected course of this diagnosis and need to alert me if symptoms do not follow expected course, or if any worse.    Smoking cessation if applicable, discussed with patient.     Patient Instructions   The medication list included in this document is our record of what you are currently taking, including any changes that were made at today's visit.  If you find any differences when compared to your medications at home, or have any questions that were not answered at your visit, please contact the office.    Monitor BP and Keep a log, Bring it with next visit

## 2024-01-24 ENCOUNTER — HOSPITAL ENCOUNTER (EMERGENCY)
Age: 89
Discharge: HOME | End: 2024-01-24
Payer: MEDICARE

## 2024-01-24 ENCOUNTER — APPOINTMENT (OUTPATIENT)
Dept: CT IMAGING | Age: 89
End: 2024-01-24
Payer: MEDICARE

## 2024-01-24 VITALS
TEMPERATURE: 98.1 F | SYSTOLIC BLOOD PRESSURE: 182 MMHG | OXYGEN SATURATION: 99 % | DIASTOLIC BLOOD PRESSURE: 63 MMHG | HEART RATE: 57 BPM | RESPIRATION RATE: 18 BRPM

## 2024-01-24 DIAGNOSIS — S09.90XA INJURY OF HEAD, INITIAL ENCOUNTER: Primary | ICD-10-CM

## 2024-01-24 PROCEDURE — 70450 CT HEAD/BRAIN W/O DYE: CPT

## 2024-01-24 PROCEDURE — 72125 CT NECK SPINE W/O DYE: CPT

## 2024-01-24 PROCEDURE — 99211 OFF/OP EST MAY X REQ PHY/QHP: CPT

## 2024-01-24 NOTE — DISCHARGE INSTRUCTIONS
History and Physical      Patient Name: Lisa To   Patient ID: [id number]   Sex: Female   YOB: 1963    Primary Care Provider: Raftia Schmidt MD   Referring Provider: Rafita Schmidt MD    Visit Date: November 30, 2020    Provider: Miguel Brady MD   Location: [facility]   Location Address: [address] [place] [address], [facility]   WILVER Rojas  [id number]   Location Phone: [phone]          Chief Complaint  · Palpitations   · Chest pain       History Of Present Illness  Consult requested by: Rafita Schmidt MD   Lisa To is a 57-year-old female with longstanding history of anxiety disorder, who has been having chest pain, two different types, for the last several months. It seems to be somewhat getting worse. The retrosternal chest pain is sharp, comes and goes, lasts only a few seconds at a time, and it is present at rest and/or with exertion. It is not severe in intensity. It is never more than a few seconds. It does not radiate to her neck or arms. The left-sided chest pain is underneath her left breast in the axillary region that is sharp in character and worse with deep breathing on and off. It can happen with or without exertion, but it never happens at night. It does not radiate. She has had episodes of palpitations on and off with her anxiety disorder. She denies dizziness or syncope.   PAST MEDICAL HISTORY: Negative for hypertension, diabetes or myocardial infarction. Positive for anxiety disorder and irregular heart rate. She has had thumb surgery.   FAMILY HISTORY: Positive for premature atherosclerosis.   CURRENT MEDICATIONS: include hormones; Tums; Xanax 1 mg p.r.n.; pain pills. The dosage and frequency of the medications were reviewed with the patient.   CHOLESTEROL STATUS: Uncertain.   PSYCHOSOCIAL HISTORY: Denies changes in mood or depression. She is . She consumes caffeine on a rare basis. She denies smoking or ethanol abuse or recreational drug use.  Please go to the radiology department at [facility] for your CT scan (the order is already in the computer and ready to go.) Please wait there for me to call you with the results and further instructions.      "  ALLERGIES: Sulfa, morphine.       Review of Systems  · Constitutional  o Admits  o : fatigue  o Denies  o : good general health lately, recent weight changes   · Eyes  o Denies  o : double vision  · HENT  o Denies  o : hearing loss or ringing, chronic sinus problem, swollen glands in neck  · Cardiovascular  o Denies  o : chest pain, palpitations (fast, fluttering, or skipping beats), swelling (feet, ankles, hands), shortness of breath while walking or lying flat  · Respiratory  o Denies  o : shortness of breath, asthma or wheezing, COPD  · Gastrointestinal  o Admits  o : nausea or vomiting  o Denies  o : ulcers  · Neurologic  o Denies  o : lightheaded or dizzy, stroke, headaches  · Musculoskeletal  o Admits  o : joint pain, back pain  · Endocrine  o Denies  o : thyroid disease, diabetes, heat or cold intolerance, excessive thirst or urination  · Heme-Lymph  o Denies  o : bleeding or bruising tendency, anemia      Vitals  Date Time BP Position Site L\R Cuff Size HR RR TEMP (F) WT  HT  BMI kg/m2 BSA m2 O2 Sat FR L/min FiO2 HC       11/30/2020 01:36 /64 Sitting    70 - R   113lbs 0oz 4'  11\" 22.82 1.46             Physical Examination  · Constitutional  o Appearance  o : Awake, alert, in no acute distress.  · Eyes  o Conjunctivae  o : Conjunctivae normal.  o Pupils and Irises  o : Fundi normal.  · Ears, Nose, Mouth and Throat  o Oral Cavity  o :   § Oral Mucosa  § : Normal oral mucosa.  · Neck  o Inspection/Palpation/Auscultation  o : No lymphadenopathy. No JVD. No bruit. Good carotid upstroke.  · Respiratory  o Respiratory  o : Clear to percussion and auscultation. Good respiratory effort.  · Cardiovascular  o Heart  o : PMI is not well felt. S1, S2 normal. No S3. No S4.  o Peripheral Vascular System  o :   § Femoral Arteries  § : Good femoral pulses.  § Pedal Pulses  § : Good pedal pulses. No pedal edema.  · Gastrointestinal  o Abdominal Examination  o : Soft. No masses or tenderness noted. No " hepatosplenomegaly. Abdominal aorta not palpable.  · Musculoskeletal  o General  o : Muscle strength is normal with normal tone.  · Skin and Subcutaneous Tissue  o General Inspection  o : Within normal limits.  o Digits and Nails  o : Nails are normal.     EKG was performed in the office today.  Indication:       chest pain.  Results:          sinus rhythm, normal axis.  No specific abnormalities noted.    Lab work from May 2019 was reviewed.  CBC/chemistry panel was normal.             Assessment     1.  Chest pain, atypical.  2.  Palpitations.  3.  Anxiety disorder.       Plan     1.  Start Toprol 25 mg 1/2 tablet at bedtime.  2.  Obtain Holter Monitor, echocardiogram and treadmill.  3.  Further management decisions contingent upon the above evaluation.    4.  She will get blood work prior to her appointment, including CBC, chemistry panel, thyroid panel, lipid panel.    Miguel Brady M.D., [facility]  pmm/dmd           This note was transcribed by Ashlie Peters.  dmd/pmm  The above service was transcribed by Ashlie Peters, and I attest to the accuracy of the note.  PMM               Electronically Signed by: Ashlie Peters-, -Author on December 2, 2020 10:13:12 AM  Electronically Co-signed by: Miguel Brady MD -Reviewer on December 3, 2020 12:52:27 PM

## 2024-01-24 NOTE — ED PROVIDER NOTES
Affect      ------------------------------ ED COURSE/MEDICAL DECISION MAKING----------------------  Medications - No data to display      ED COURSE:  ED Course as of 01/24/24 1559   Wed Jan 24, 2024   1557 Discussed results with patient over the phone.  CT of the head and cervical spine did not reveal any evidence of acute intracranial hemorrhage, skull fracture, or cervical spine fracture.  There is degenerative changes noted however.  Patient is nontoxic-appearing, afebrile, no acute distress while at urgent care today.  Advised to follow very closely with PCP for recheck return to the ER with any new or worsening symptoms.  Patient voiced understanding and is agreeable to the above treatment plan. [MS]      ED Course User Index  [MS] Hannah Cartagena PA       Medical Decision Making:    See ED course above.     Counseling:   The emergency provider has spoken with the patient and granddaughter and discussed today’s results, in addition to providing specific details for the plan of care and counseling regarding the diagnosis and prognosis.  Questions are answered at this time and they are agreeable with the plan.      --------------------------------- IMPRESSION AND DISPOSITION ---------------------------------    IMPRESSION  1. Injury of head, initial encounter        DISPOSITION  Disposition: Discharge to home  Patient condition is stable      NOTE: This report was transcribed using voice recognition software. Every effort was made to ensure accuracy; however, inadvertent computerized transcription errors may be present        Hnanah Cartagena PA  01/24/24 4781

## 2024-01-24 NOTE — ED NOTES
Sent to [facility] for CT scan. Radiology notified via voicemail, hold and call. Patient and family member instructed.     Gayle Sanchez LPN  01/24/24 9674

## 2024-01-29 ENCOUNTER — OFFICE VISIT (OUTPATIENT)
Dept: FAMILY MEDICINE CLINIC | Age: 89
End: 2024-01-29
Payer: MEDICARE

## 2024-01-29 ENCOUNTER — TELEPHONE (OUTPATIENT)
Dept: FAMILY MEDICINE CLINIC | Age: 89
End: 2024-01-29

## 2024-01-29 VITALS
TEMPERATURE: 98.4 F | OXYGEN SATURATION: 98 % | SYSTOLIC BLOOD PRESSURE: 152 MMHG | HEIGHT: 62 IN | BODY MASS INDEX: 25.96 KG/M2 | WEIGHT: 141.1 LBS | DIASTOLIC BLOOD PRESSURE: 64 MMHG | HEART RATE: 63 BPM

## 2024-01-29 DIAGNOSIS — E03.8 OTHER SPECIFIED HYPOTHYROIDISM: ICD-10-CM

## 2024-01-29 DIAGNOSIS — S20.212A CONTUSION OF RIB, LEFT, INITIAL ENCOUNTER: Primary | ICD-10-CM

## 2024-01-29 DIAGNOSIS — N18.31 STAGE 3A CHRONIC KIDNEY DISEASE (HCC): ICD-10-CM
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
## 2024-01-29 DIAGNOSIS — I10 ESSENTIAL HYPERTENSION: ICD-10-CM

## 2024-01-29 PROCEDURE — 99213 OFFICE O/P EST LOW 20 MIN: CPT | Performed by: INTERNAL MEDICINE

## 2024-01-29 PROCEDURE — G8417 CALC BMI ABV UP PARAM F/U: HCPCS | Performed by: INTERNAL MEDICINE

## 2024-01-29 PROCEDURE — 1123F ACP DISCUSS/DSCN MKR DOCD: CPT | Performed by: INTERNAL MEDICINE

## 2024-01-29 PROCEDURE — 1090F PRES/ABSN URINE INCON ASSESS: CPT | Performed by: INTERNAL MEDICINE

## 2024-01-29 PROCEDURE — G8484 FLU IMMUNIZE NO ADMIN: HCPCS | Performed by: INTERNAL MEDICINE

## 2024-01-29 PROCEDURE — 1036F TOBACCO NON-USER: CPT | Performed by: INTERNAL MEDICINE

## 2024-01-29 PROCEDURE — G8427 DOCREV CUR MEDS BY ELIG CLIN: HCPCS | Performed by: INTERNAL MEDICINE

## 2024-01-29 ASSESSMENT — ENCOUNTER SYMPTOMS
BLOOD IN STOOL: 0
SINUS PAIN: 0
SORE THROAT: 0
ABDOMINAL PAIN: 0
NAUSEA: 0
EYE DISCHARGE: 0
SHORTNESS OF BREATH: 0
EYE PAIN: 0

## 2024-01-29 NOTE — PROGRESS NOTES
Chief Complaint   Patient presents with    ED Follow-up     Pt here for ED follow up from 01/24/2024 for a mechanical fall hit left hip and left side of head. Pt complaining of back pain since       Hypertension     BP is running high today 223/113    Health Maintenance     Flu vaccine - pt declined, AWV -03/14/2024         HPI:  Patient is here for follow-up     Pt had a fall, was seen at   CT head and CT C Spine- neg    Here for f/u BP is still elevated at home    Pt has pain in mid back and left ribs in back    BP is noted        Allergy and Medications are reviewed and updated.  Past Medical History, Surgical History, and Family History has been reviewed and updated.    Review of Systems:  Review of Systems   Constitutional:  Negative for chills and fever.   HENT:  Negative for congestion, sinus pain and sore throat.    Eyes:  Negative for pain and discharge.   Respiratory:  Negative for shortness of breath (No new SOb).    Cardiovascular:  Negative for chest pain.   Gastrointestinal:  Negative for abdominal pain, blood in stool and nausea.   Genitourinary:  Negative for flank pain and frequency.   Musculoskeletal:  Negative for neck pain.   Hematological:  Does not bruise/bleed easily.   Psychiatric/Behavioral:  Negative for suicidal ideas.          Vitals:    01/29/24 1437 01/29/24 1444   BP: (!) 180/72 (!) 152/64   Position: Sitting Sitting   Pulse: 63    Temp: 98.4 °F (36.9 °C)    TempSrc: Temporal    SpO2: 98%    Weight: 64 kg (141 lb 1.6 oz)    Height: 1.575 m (5' 2\")        Physical Exam  Vitals reviewed.   Constitutional:       Appearance: She is well-developed.   HENT:      Head: Normocephalic and atraumatic.      Nose: Nose normal.   Eyes:      Conjunctiva/sclera: Conjunctivae normal.      Pupils: Pupils are equal, round, and reactive to light.   Neck:      Vascular: No JVD.   Cardiovascular:      Rate and Rhythm: Normal rate and regular rhythm.   Pulmonary:      Effort: Pulmonary effort is normal.

## 2024-02-11 ENCOUNTER — HOSPITAL ENCOUNTER (EMERGENCY)
Age: 89
Discharge: HOME | End: 2024-02-11
Attending: EMERGENCY MEDICINE
Payer: MEDICARE

## 2024-02-11 VITALS
DIASTOLIC BLOOD PRESSURE: 61 MMHG | TEMPERATURE: 98 F | RESPIRATION RATE: 16 BRPM | OXYGEN SATURATION: 97 % | HEART RATE: 64 BPM | SYSTOLIC BLOOD PRESSURE: 125 MMHG

## 2024-02-11 DIAGNOSIS — S39.012A BACK STRAIN, INITIAL ENCOUNTER: Primary | ICD-10-CM

## 2024-02-11 PROCEDURE — 6370000000 HC RX 637 (ALT 250 FOR IP): Performed by: EMERGENCY MEDICINE

## 2024-02-11 PROCEDURE — 99283 EMERGENCY DEPT VISIT LOW MDM: CPT

## 2024-02-11 RX ORDER — ORPHENADRINE CITRATE 100 MG/1
100 TABLET, EXTENDED RELEASE ORAL ONCE
Status: COMPLETED | OUTPATIENT
Start: 2024-02-11 | End: 2024-02-11

## 2024-02-11 RX ORDER — HYDROCODONE BITARTRATE AND ACETAMINOPHEN 5; 325 MG/1; MG/1
1 TABLET ORAL 3 TIMES DAILY PRN
Qty: 12 TABLET | Refills: 0 | Status: SHIPPED | OUTPATIENT
Start: 2024-02-11 | End: 2024-02-15

## 2024-02-11 RX ORDER — HYDROCODONE BITARTRATE AND ACETAMINOPHEN 5; 325 MG/1; MG/1
1 TABLET ORAL ONCE
Status: COMPLETED | OUTPATIENT
Start: 2024-02-11 | End: 2024-02-11

## 2024-02-11 RX ADMIN — HYDROCODONE BITARTRATE AND ACETAMINOPHEN 1 TABLET: 5; 325 TABLET ORAL at 12:18

## 2024-02-11 RX ADMIN — ORPHENADRINE CITRATE 100 MG: 100 TABLET, EXTENDED RELEASE ORAL at 12:18

## 2024-02-11 NOTE — DISCHARGE INSTR - COC
Address:  Phone:  Fax:    Dialysis Facility (if applicable)   Name:  Address:  Dialysis Schedule:  Phone:  Fax:    / signature: {Esignature:[id number]}    PHYSICIAN SECTION    Prognosis: {Prognosis:6100439217}    Condition at Discharge: { Patient Condition:763873375}    Rehab Potential (if transferring to Rehab): {Prognosis:4597403830}    Recommended Labs or Other Treatments After Discharge: ***    Physician Certification: I certify the above information and transfer of Maribeth Welch  is necessary for the continuing treatment of the diagnosis listed and that she requires {Admit to Appropriate Level of Care:15706} for {GREATER/LESS:501736673} 30 days.     Update Admission H&P: {CHP DME Changes in HandP:648337818}    PHYSICIAN SIGNATURE:  {Esignature:[id number]}

## 2024-02-11 NOTE — ED PROVIDER NOTES
toe surgery (Left, 12/9/2022).    Social History:  reports that she has never smoked. She has never used smokeless tobacco. She reports that she does not drink alcohol and does not use drugs.    Family History: family history is not on file.     The patient’s home medications have been reviewed.    Allergies: Ciprofloxacin hcl, Cleocin [clindamycin hcl], Lisinopril, Pcn [penicillins], Statins, and Penciclovir    -------------------------------------------------- RESULTS -------------------------------------------------  Labs:  No results found for this visit on 02/11/24.    Radiology:  No orders to display       ------------------------- NURSING NOTES AND VITALS REVIEWED ---------------------------  Date / Time Roomed:  2/11/2024 11:52 AM  ED Bed Assignment:  Internal Waiting A/IntWa*    The nursing notes within the ED encounter and vital signs as below have been reviewed.   BP (!) 173/77   Pulse 64   Temp 98 °F (36.7 °C)   Resp 16   SpO2 98%   Oxygen Saturation Interpretation: Normal      ------------------------------------------ PROGRESS NOTES ------------------------------------------  I have spoken with the patient and discussed today’s results, in addition to providing specific details for the plan of care and counseling regarding the diagnosis and prognosis.  Their questions are answered at this time and they are agreeable with the plan. I discussed at length with them reasons for immediate return here for re evaluation. They will followup with primary care by calling their office tomorrow.      --------------------------------- ADDITIONAL PROVIDER NOTES ---------------------------------  At this time the patient is without objective evidence of an acute process requiring hospitalization or inpatient management.  They have remained hemodynamically stable throughout their entire ED visit and are stable for discharge with outpatient follow-up.     The plan has been discussed in detail and they are aware of

## 2024-02-16 NOTE — ASSESSMENT & PLAN NOTE
Fosamax  weekly
Well-controlled, continue current medications     LT4 50 mcg daily
Well-controlled, continue current medications     Losartan 50 mg daily  Clonidine 0.1 mg bid  Maxzide 25 daily
General    There is no problem list on file for this patient.     Patient's BP was low when she initially came in. Gave patient 8 oz of water and rechecked BP. (See VS)  I have discussed the results, diagnosis treatment plan wit the patient. The patient also understands that early in the process of an illness, the urgent care workup can be falsely reassuring. Routine discharge counseling and specific return precautions dicussed with the patient and the patient understand that worsening, changing or persistent symptoms should prompt an immediate return to an urgent care or emergency department. Patient/Guardian expressed understanding and agrees with the discharge instructions. No further questions at this time of discharge.        I ADVISED PATIENT TO GO TO ER IF SYMPTOMS WORSEN , CHANGE OR FAILS TO IMPROVE.    I have discussed the diagnosis with the patient and the intended plan as seen in the above orders.  The patient has received an after-visit summary and questions were answered concerning future plans.  I have discussed medication side effects and warnings with the patient as well. The patient agrees and understands above plan.       An electronic signature was used to authenticate this note.  -- ANA Perdue - NP

## 2024-02-19 ENCOUNTER — OFFICE VISIT (OUTPATIENT)
Dept: FAMILY MEDICINE CLINIC | Age: 89
End: 2024-02-19
Payer: MEDICARE

## 2024-02-19 VITALS
DIASTOLIC BLOOD PRESSURE: 62 MMHG | HEIGHT: 62 IN | BODY MASS INDEX: 25.12 KG/M2 | OXYGEN SATURATION: 96 % | TEMPERATURE: 97.8 F | WEIGHT: 136.5 LBS | HEART RATE: 62 BPM | SYSTOLIC BLOOD PRESSURE: 138 MMHG

## 2024-02-19 DIAGNOSIS — S20.212A CONTUSION OF RIB, LEFT, INITIAL ENCOUNTER: Primary | ICD-10-CM

## 2024-02-19 DIAGNOSIS — E78.49 OTHER HYPERLIPIDEMIA: ICD-10-CM

## 2024-02-19 DIAGNOSIS — N18.31 STAGE 3A CHRONIC KIDNEY DISEASE (HCC): ICD-10-CM

## 2024-02-19 DIAGNOSIS — I10 ESSENTIAL HYPERTENSION: ICD-10-CM

## 2024-02-19 DIAGNOSIS — E03.8 OTHER SPECIFIED HYPOTHYROIDISM: ICD-10-CM

## 2024-02-19 DIAGNOSIS — M35.3 PMR (POLYMYALGIA RHEUMATICA) (HCC): ICD-10-CM

## 2024-02-19 PROCEDURE — 1090F PRES/ABSN URINE INCON ASSESS: CPT | Performed by: INTERNAL MEDICINE

## 2024-02-19 PROCEDURE — G8427 DOCREV CUR MEDS BY ELIG CLIN: HCPCS | Performed by: INTERNAL MEDICINE

## 2024-02-19 PROCEDURE — G8420 CALC BMI NORM PARAMETERS: HCPCS | Performed by: INTERNAL MEDICINE

## 2024-02-19 PROCEDURE — 1123F ACP DISCUSS/DSCN MKR DOCD: CPT | Performed by: INTERNAL MEDICINE

## 2024-02-19 PROCEDURE — G8484 FLU IMMUNIZE NO ADMIN: HCPCS | Performed by: INTERNAL MEDICINE

## 2024-02-19 PROCEDURE — 1036F TOBACCO NON-USER: CPT | Performed by: INTERNAL MEDICINE

## 2024-02-19 PROCEDURE — 99214 OFFICE O/P EST MOD 30 MIN: CPT | Performed by: INTERNAL MEDICINE

## 2024-02-19 RX ORDER — LIDOCAINE 50 MG/G
1 PATCH TOPICAL DAILY
Qty: 30 PATCH | Refills: 0 | Status: SHIPPED | OUTPATIENT
Start: 2024-02-19 | End: 2024-03-20

## 2024-02-19 ASSESSMENT — ENCOUNTER SYMPTOMS
SORE THROAT: 0
NAUSEA: 0
ABDOMINAL PAIN: 0
SHORTNESS OF BREATH: 0
EYE DISCHARGE: 0
BLOOD IN STOOL: 0
SINUS PAIN: 0
EYE PAIN: 0

## 2024-03-22 DIAGNOSIS — I10 ESSENTIAL HYPERTENSION: ICD-10-CM

## 2024-03-22 RX ORDER — CLONIDINE HYDROCHLORIDE 0.1 MG/1
0.1 TABLET ORAL 3 TIMES DAILY
Qty: 270 TABLET | Refills: 3 | OUTPATIENT
Start: 2024-03-22 | End: 2024-06-20

## 2024-04-18 DIAGNOSIS — E03.8 OTHER SPECIFIED HYPOTHYROIDISM: ICD-10-CM

## 2024-04-18 DIAGNOSIS — Z12.31 ENCOUNTER FOR SCREENING MAMMOGRAM FOR BREAST CANCER: ICD-10-CM

## 2024-04-18 DIAGNOSIS — I10 ESSENTIAL HYPERTENSION: ICD-10-CM

## 2024-04-18 DIAGNOSIS — M35.3 PMR (POLYMYALGIA RHEUMATICA) (HCC): ICD-10-CM

## 2024-04-18 DIAGNOSIS — N18.32 STAGE 3B CHRONIC KIDNEY DISEASE (HCC): ICD-10-CM

## 2024-04-18 LAB
ALBUMIN: 4.1 G/DL (ref 3.5–5.2)
ALP BLD-CCNC: 110 U/L (ref 35–104)
ALT SERPL-CCNC: 9 U/L (ref 0–32)
ANION GAP SERPL CALCULATED.3IONS-SCNC: 17 MMOL/L (ref 7–16)
AST SERPL-CCNC: 23 U/L (ref 0–31)
BACTERIA: ABNORMAL
BASOPHILS ABSOLUTE: 0.02 K/UL (ref 0–0.2)
BASOPHILS RELATIVE PERCENT: 0 % (ref 0–2)
BILIRUB SERPL-MCNC: 0.3 MG/DL (ref 0–1.2)
BILIRUBIN URINE: NEGATIVE
BUN BLDV-MCNC: 24 MG/DL (ref 6–23)
C-REACTIVE PROTEIN: <3 MG/L (ref 0–5)
CALCIUM SERPL-MCNC: 9.8 MG/DL (ref 8.6–10.2)
CHLORIDE BLD-SCNC: 105 MMOL/L (ref 98–107)
CHOLESTEROL, FASTING: 212 MG/DL
CO2: 21 MMOL/L (ref 22–29)
COLOR: YELLOW
CREAT SERPL-MCNC: 1.1 MG/DL (ref 0.5–1)
EOSINOPHILS ABSOLUTE: 0.18 K/UL (ref 0.05–0.5)
EOSINOPHILS RELATIVE PERCENT: 2 % (ref 0–6)
EPITHELIAL CELLS UA: ABNORMAL /HPF
GFR SERPL CREATININE-BSD FRML MDRD: 51 ML/MIN/1.73M2
GLUCOSE FASTING: 76 MG/DL (ref 74–99)
GLUCOSE URINE: NEGATIVE MG/DL
HCT VFR BLD CALC: 46.5 % (ref 34–48)
HDLC SERPL-MCNC: 42 MG/DL
HEMOGLOBIN: 14.2 G/DL (ref 11.5–15.5)
IMMATURE GRANULOCYTES %: 0 % (ref 0–5)
IMMATURE GRANULOCYTES ABSOLUTE: 0.04 K/UL (ref 0–0.58)
KETONES, URINE: NEGATIVE MG/DL
LDL CHOLESTEROL: 134 MG/DL
LEUKOCYTE ESTERASE, URINE: ABNORMAL
LYMPHOCYTES ABSOLUTE: 2.58 K/UL (ref 1.5–4)
LYMPHOCYTES RELATIVE PERCENT: 25 % (ref 20–42)
MCH RBC QN AUTO: 30.1 PG (ref 26–35)
MCHC RBC AUTO-ENTMCNC: 30.5 G/DL (ref 32–34.5)
MCV RBC AUTO: 98.7 FL (ref 80–99.9)
MONOCYTES ABSOLUTE: 0.79 K/UL (ref 0.1–0.95)
MONOCYTES RELATIVE PERCENT: 8 % (ref 2–12)
NEUTROPHILS ABSOLUTE: 6.69 K/UL (ref 1.8–7.3)
NEUTROPHILS RELATIVE PERCENT: 65 % (ref 43–80)
NITRITE, URINE: POSITIVE
PDW BLD-RTO: 13.6 % (ref 11.5–15)
PH UA: 5.5 (ref 5–9)
PLATELET # BLD: 249 K/UL (ref 130–450)
PMV BLD AUTO: 10 FL (ref 7–12)
POTASSIUM SERPL-SCNC: 4.2 MMOL/L (ref 3.5–5)
PROTEIN UA: NEGATIVE MG/DL
RBC # BLD: 4.71 M/UL (ref 3.5–5.5)
RBC UA: ABNORMAL /HPF
SEDIMENTATION RATE, ERYTHROCYTE: 11 MM/HR (ref 0–20)
SODIUM BLD-SCNC: 143 MMOL/L (ref 132–146)
SPECIFIC GRAVITY UA: <1.005 (ref 1–1.03)
TOTAL PROTEIN: 7.3 G/DL (ref 6.4–8.3)
TRIGLYCERIDE, FASTING: 182 MG/DL
TSH SERPL DL<=0.05 MIU/L-ACNC: 2.56 UIU/ML (ref 0.27–4.2)
TURBIDITY: CLEAR
URINE HGB: NEGATIVE
UROBILINOGEN, URINE: 0.2 EU/DL (ref 0–1)
VLDLC SERPL CALC-MCNC: 36 MG/DL
WBC # BLD: 10.3 K/UL (ref 4.5–11.5)
WBC UA: ABNORMAL /HPF

## 2024-04-20 DIAGNOSIS — I10 ESSENTIAL HYPERTENSION: ICD-10-CM

## 2024-04-22 DIAGNOSIS — I10 ESSENTIAL HYPERTENSION: ICD-10-CM

## 2024-04-22 RX ORDER — HYDRALAZINE HYDROCHLORIDE 25 MG/1
25 TABLET, FILM COATED ORAL 2 TIMES DAILY
Qty: 180 TABLET | Refills: 0 | OUTPATIENT
Start: 2024-04-22

## 2024-04-22 RX ORDER — HYDRALAZINE HYDROCHLORIDE 25 MG/1
25 TABLET, FILM COATED ORAL 2 TIMES DAILY
Qty: 180 TABLET | Refills: 0 | Status: SHIPPED | OUTPATIENT
Start: 2024-04-22

## 2024-04-22 NOTE — TELEPHONE ENCOUNTER
Pt called for refill.   Pt stated she is out of medication.  Last seen 2/19/2024  Next appt 4/24/2024  [facility] [address]

## 2024-04-24 ENCOUNTER — OFFICE VISIT (OUTPATIENT)
Dept: FAMILY MEDICINE CLINIC | Age: 89
End: 2024-04-24
Payer: MEDICARE

## 2024-04-24 VITALS
WEIGHT: 137.4 LBS | HEART RATE: 59 BPM | HEIGHT: 62 IN | DIASTOLIC BLOOD PRESSURE: 76 MMHG | TEMPERATURE: 97.5 F | BODY MASS INDEX: 25.28 KG/M2 | SYSTOLIC BLOOD PRESSURE: 158 MMHG | OXYGEN SATURATION: 97 %

## 2024-04-24 DIAGNOSIS — Z00.00 MEDICARE ANNUAL WELLNESS VISIT, SUBSEQUENT: Primary | ICD-10-CM

## 2024-04-24 DIAGNOSIS — I10 ESSENTIAL HYPERTENSION: ICD-10-CM

## 2024-04-24 DIAGNOSIS — I65.21 STENOSIS OF RIGHT CAROTID ARTERY: ICD-10-CM

## 2024-04-24 PROCEDURE — G0439 PPPS, SUBSEQ VISIT: HCPCS | Performed by: INTERNAL MEDICINE

## 2024-04-24 PROCEDURE — 1123F ACP DISCUSS/DSCN MKR DOCD: CPT | Performed by: INTERNAL MEDICINE

## 2024-04-24 RX ORDER — CLONIDINE HYDROCHLORIDE 0.1 MG/1
0.1 TABLET ORAL 3 TIMES DAILY
Qty: 270 TABLET | Refills: 0 | Status: SHIPPED | OUTPATIENT
Start: 2024-04-24 | End: 2024-07-23

## 2024-04-24 SDOH — ECONOMIC STABILITY: FOOD INSECURITY: WITHIN THE PAST 12 MONTHS, YOU WORRIED THAT YOUR FOOD WOULD RUN OUT BEFORE YOU GOT MONEY TO BUY MORE.: NEVER TRUE

## 2024-04-24 SDOH — ECONOMIC STABILITY: INCOME INSECURITY: HOW HARD IS IT FOR YOU TO PAY FOR THE VERY BASICS LIKE FOOD, HOUSING, MEDICAL CARE, AND HEATING?: NOT HARD AT ALL

## 2024-04-24 SDOH — ECONOMIC STABILITY: FOOD INSECURITY: WITHIN THE PAST 12 MONTHS, THE FOOD YOU BOUGHT JUST DIDN'T LAST AND YOU DIDN'T HAVE MONEY TO GET MORE.: NEVER TRUE

## 2024-04-24 ASSESSMENT — PATIENT HEALTH QUESTIONNAIRE - PHQ9
SUM OF ALL RESPONSES TO PHQ9 QUESTIONS 1 & 2: 0
SUM OF ALL RESPONSES TO PHQ QUESTIONS 1-9: 0
1. LITTLE INTEREST OR PLEASURE IN DOING THINGS: NOT AT ALL
2. FEELING DOWN, DEPRESSED OR HOPELESS: NOT AT ALL

## 2024-04-24 NOTE — PROGRESS NOTES
Medicare Annual Wellness Visit    Maribeth Welch is here for Medicare AWV, Discuss Labs (Completed on 04/18/2024), Medication Refill, and Health Maintenance (Shingles vaccine, RSV )    Assessment & Plan   Medicare annual wellness visit, subsequent  Essential hypertension  -     cloNIDine (CATAPRES) 0.1 MG tablet; Take 1 tablet by mouth in the morning, at noon, and at bedtime, Disp-270 tablet, R-0Normal  Stenosis of right carotid artery  -     Vascular duplex carotid bilateral; Future    Recommendations for Preventive Services Due: see orders and patient instructions/AVS.  Recommended screening schedule for the next 5-10 years is provided to the patient in written form: see Patient Instructions/AVS.     Return in 1 year (on 4/24/2025) for Medicare Annual Wellness Visit in 1 year, Annual Medicare Wellness Exam.     Subjective       Patient's complete Health Risk Assessment and screening values have been reviewed and are found in Flowsheets. The following problems were reviewed today and where indicated follow up appointments were made and/or referrals ordered.    Positive Risk Factor Screenings with Interventions:    Fall Risk:  Do you feel unsteady or are you worried about falling? : no  2 or more falls in past year?: no  Fall with injury in past year?: (!) yes     Interventions:    Reviewed medications, home hazards, visual acuity, and co-morbidities that can increase risk for falls                Vision Screen:  Do you have difficulty driving, watching TV, or doing any of your daily activities because of your eyesight?: No  Have you had an eye exam within the past year?: (!) No  No results found.    Interventions:   Patient encouraged to make appointment with their eye specialist                    Objective   Vitals:    04/24/24 1147 04/24/24 1152   BP: (!) 158/76 (!) 158/76   Position: Sitting    Pulse: 59    Temp: 97.5 °F (36.4 °C)    TempSrc: Temporal    SpO2: 97%    Weight: 62.3 kg (137 lb 6.4 oz)

## 2024-05-21 NOTE — PATIENT INSTRUCTIONS
5/21/2024                   Gisell Booth  [id number] [place] [address]      Dear Gisell,    We are writing to inform you that you are due for your Annual Medicare Wellness Exam.    This appointment will focus on preventative care including a Personalized Prevention Plan, a check of your weight, blood pressure and pulse, a brief check of your hearing and vision, any immunizations you may need, as well as referrals for any other screening services or test you may need.    Medicare will cover the cost of the preventative care services.  Any additional services including lab and some immunizations may not be a covered service.  If you have any other health concerns or questions to discuss with your provider, a medical visit may be added to your wellness visit and you may need to pay a deductible or co-pay depending on your Medicare plan.    Please call our office at [phone] to schedule your appointment with Luis Finch MD .    Please fill out the enclosed questionnaire a few days before your exam and bring it along to the appointment.    We look forward to seeing you soon.    Luis Finch MD  [id number] [facility] [address]  [phone]     The medication list included in this document is our record of what you are currently taking, including any changes that were made at today's visit.  If you find any differences when compared to your medications at home, or have any questions that were not answered at your visit, please contact the office.

## 2024-06-06 ENCOUNTER — HOSPITAL ENCOUNTER (OUTPATIENT)
Dept: MAMMOGRAPHY | Age: 89
Discharge: HOME | End: 2024-06-08
Attending: INTERNAL MEDICINE
Payer: MEDICARE

## 2024-06-06 VITALS — BODY MASS INDEX: 24.29 KG/M2 | WEIGHT: 132 LBS | HEIGHT: 62 IN

## 2024-06-06 DIAGNOSIS — M81.0 AGE-RELATED OSTEOPOROSIS WITHOUT CURRENT PATHOLOGICAL FRACTURE: ICD-10-CM

## 2024-06-06 DIAGNOSIS — Z12.31 ENCOUNTER FOR SCREENING MAMMOGRAM FOR BREAST CANCER: ICD-10-CM

## 2024-06-06 PROCEDURE — 77067 SCR MAMMO BI INCL CAD: CPT

## 2024-06-06 PROCEDURE — 77080 DXA BONE DENSITY AXIAL: CPT

## 2024-06-09 DIAGNOSIS — E03.8 OTHER SPECIFIED HYPOTHYROIDISM: ICD-10-CM

## 2024-06-10 RX ORDER — LEVOTHYROXINE SODIUM 0.05 MG/1
50 TABLET ORAL NIGHTLY
Qty: 90 TABLET | Refills: 3 | OUTPATIENT
Start: 2024-06-10

## 2024-06-11 ENCOUNTER — OFFICE VISIT (OUTPATIENT)
Dept: FAMILY MEDICINE CLINIC | Age: 89
End: 2024-06-11
Payer: MEDICARE

## 2024-06-11 VITALS
DIASTOLIC BLOOD PRESSURE: 64 MMHG | HEIGHT: 62 IN | SYSTOLIC BLOOD PRESSURE: 160 MMHG | TEMPERATURE: 98 F | OXYGEN SATURATION: 98 % | BODY MASS INDEX: 25.17 KG/M2 | WEIGHT: 136.8 LBS | HEART RATE: 67 BPM

## 2024-06-11 DIAGNOSIS — E03.8 OTHER SPECIFIED HYPOTHYROIDISM: ICD-10-CM

## 2024-06-11 DIAGNOSIS — Z91.81 HISTORY OF RECENT FALL: ICD-10-CM

## 2024-06-11 DIAGNOSIS — N18.31 STAGE 3A CHRONIC KIDNEY DISEASE (HCC): ICD-10-CM

## 2024-06-11 DIAGNOSIS — I10 ESSENTIAL HYPERTENSION: Primary | ICD-10-CM

## 2024-06-11 DIAGNOSIS — E78.49 OTHER HYPERLIPIDEMIA: ICD-10-CM

## 2024-06-11 DIAGNOSIS — S62.667A CLOSED NONDISPLACED FRACTURE OF DISTAL PHALANX OF LEFT LITTLE FINGER, INITIAL ENCOUNTER: ICD-10-CM

## 2024-06-11 PROCEDURE — 1036F TOBACCO NON-USER: CPT | Performed by: INTERNAL MEDICINE

## 2024-06-11 PROCEDURE — G8417 CALC BMI ABV UP PARAM F/U: HCPCS | Performed by: INTERNAL MEDICINE

## 2024-06-11 PROCEDURE — 1090F PRES/ABSN URINE INCON ASSESS: CPT | Performed by: INTERNAL MEDICINE

## 2024-06-11 PROCEDURE — 1123F ACP DISCUSS/DSCN MKR DOCD: CPT | Performed by: INTERNAL MEDICINE

## 2024-06-11 PROCEDURE — G8427 DOCREV CUR MEDS BY ELIG CLIN: HCPCS | Performed by: INTERNAL MEDICINE

## 2024-06-11 PROCEDURE — 99214 OFFICE O/P EST MOD 30 MIN: CPT | Performed by: INTERNAL MEDICINE

## 2024-06-11 RX ORDER — AMLODIPINE BESYLATE 2.5 MG/1
2.5 TABLET ORAL NIGHTLY
Qty: 30 TABLET | Refills: 2 | Status: SHIPPED | OUTPATIENT
Start: 2024-06-11

## 2024-06-11 RX ORDER — HYDRALAZINE HYDROCHLORIDE 25 MG/1
25 TABLET, FILM COATED ORAL 3 TIMES DAILY
Qty: 270 TABLET | Refills: 0 | Status: SHIPPED | OUTPATIENT
Start: 2024-06-11 | End: 2024-09-09

## 2024-06-11 RX ORDER — ALENDRONATE SODIUM 70 MG/1
70 TABLET ORAL
COMMUNITY
Start: 2024-05-03

## 2024-06-11 ASSESSMENT — ENCOUNTER SYMPTOMS
EYE DISCHARGE: 0
BLOOD IN STOOL: 0
EYE PAIN: 0
ABDOMINAL PAIN: 0
SORE THROAT: 0
SHORTNESS OF BREATH: 0
SINUS PAIN: 0
NAUSEA: 0

## 2024-06-11 NOTE — PROGRESS NOTES
Chief Complaint   Patient presents with    Fall     C/o Fall yesterday went to [facility] urgent care broken little finger on left hand. Hematoma on right knee. Pt following up with [facility] tomorrow       Hypertension     Follow up forgot BP log today     Results     Carotid ultrasound 04/29/2024        HPI:  Patient is here for follow-up    Above noted    Had a mechanical fall at home, landed on rt knee and also injured left hand. Was seen at [facility]- Has fracture of -? 5MP joint. Has an appt with ortho tomorrow    Has soft cast in left hand    BP is noted, Forgot yo bring her BP log- But is elevated at home - per pt        Allergy and Medications are reviewed and updated.  Past Medical History, Surgical History, and Family History has been reviewed and updated.    Review of Systems:  Review of Systems   Constitutional:  Negative for chills and fever.   HENT:  Negative for congestion, sinus pain and sore throat.    Eyes:  Negative for pain and discharge.   Respiratory:  Negative for shortness of breath (No new SOb).    Cardiovascular:  Negative for chest pain.   Gastrointestinal:  Negative for abdominal pain, blood in stool and nausea.   Genitourinary:  Negative for flank pain and frequency.   Musculoskeletal:  Negative for neck pain.   Hematological:  Does not bruise/bleed easily.   Psychiatric/Behavioral:  Negative for suicidal ideas.          Vitals:    06/11/24 0926 06/11/24 0928   BP: (!) 160/64 (!) 160/64   Position: Sitting Sitting   Pulse: 67    Temp: 98 °F (36.7 °C)    TempSrc: Temporal    SpO2: 98%    Weight: 62.1 kg (136 lb 12.8 oz)    Height: 1.575 m (5' 2\")        Physical Exam  Vitals reviewed.   Constitutional:       Appearance: She is well-developed.   HENT:      Head: Normocephalic and atraumatic.      Nose: Nose normal.   Eyes:      Conjunctiva/sclera: Conjunctivae normal.      Pupils: Pupils are equal, round, and reactive to light.   Neck:      Vascular: No JVD.

## 2024-06-22 DIAGNOSIS — I10 ESSENTIAL HYPERTENSION: ICD-10-CM

## 2024-06-24 RX ORDER — LOSARTAN POTASSIUM 50 MG/1
TABLET ORAL
Qty: 180 TABLET | Refills: 3 | OUTPATIENT
Start: 2024-06-24

## 2024-07-07 DIAGNOSIS — I10 ESSENTIAL HYPERTENSION: ICD-10-CM

## 2024-07-08 DIAGNOSIS — E03.8 OTHER SPECIFIED HYPOTHYROIDISM: ICD-10-CM

## 2024-07-08 RX ORDER — CLONIDINE HYDROCHLORIDE 0.1 MG/1
0.1 TABLET ORAL 3 TIMES DAILY
Qty: 270 TABLET | Refills: 3 | OUTPATIENT
Start: 2024-07-08 | End: 2024-10-06

## 2024-07-08 NOTE — TELEPHONE ENCOUNTER
Pt called for refill    Last seen 6/11/2024  Next appt 9/3/2024    Requested Prescriptions     Pending Prescriptions Disp Refills    levothyroxine (SYNTHROID) 50 MCG tablet 90 tablet 1     Sig: Take 1 tablet by mouth at bedtime      [facility]

## 2024-07-09 RX ORDER — LEVOTHYROXINE SODIUM 0.05 MG/1
50 TABLET ORAL NIGHTLY
Qty: 90 TABLET | Refills: 1 | Status: SHIPPED | OUTPATIENT
Start: 2024-07-09

## 2024-08-23 DIAGNOSIS — I10 ESSENTIAL HYPERTENSION: ICD-10-CM

## 2024-08-23 RX ORDER — HYDRALAZINE HYDROCHLORIDE 25 MG/1
25 TABLET, FILM COATED ORAL 3 TIMES DAILY
Qty: 270 TABLET | Refills: 3 | OUTPATIENT
Start: 2024-08-23

## 2024-09-03 ENCOUNTER — OFFICE VISIT (OUTPATIENT)
Dept: FAMILY MEDICINE CLINIC | Age: 89
End: 2024-09-03
Payer: MEDICARE

## 2024-09-03 VITALS
SYSTOLIC BLOOD PRESSURE: 170 MMHG | WEIGHT: 134.5 LBS | DIASTOLIC BLOOD PRESSURE: 60 MMHG | TEMPERATURE: 97.8 F | HEIGHT: 62 IN | OXYGEN SATURATION: 98 % | BODY MASS INDEX: 24.75 KG/M2 | HEART RATE: 59 BPM

## 2024-09-03 DIAGNOSIS — N18.31 STAGE 3A CHRONIC KIDNEY DISEASE (HCC): ICD-10-CM

## 2024-09-03 DIAGNOSIS — E78.49 OTHER HYPERLIPIDEMIA: Primary | ICD-10-CM

## 2024-09-03 DIAGNOSIS — M35.3 PMR (POLYMYALGIA RHEUMATICA) (HCC): ICD-10-CM

## 2024-09-03 DIAGNOSIS — E03.8 OTHER SPECIFIED HYPOTHYROIDISM: ICD-10-CM

## 2024-09-03 DIAGNOSIS — I10 ESSENTIAL HYPERTENSION: ICD-10-CM

## 2024-09-03 PROCEDURE — 99214 OFFICE O/P EST MOD 30 MIN: CPT | Performed by: INTERNAL MEDICINE

## 2024-09-03 PROCEDURE — G8427 DOCREV CUR MEDS BY ELIG CLIN: HCPCS | Performed by: INTERNAL MEDICINE

## 2024-09-03 PROCEDURE — 1036F TOBACCO NON-USER: CPT | Performed by: INTERNAL MEDICINE

## 2024-09-03 PROCEDURE — 1123F ACP DISCUSS/DSCN MKR DOCD: CPT | Performed by: INTERNAL MEDICINE

## 2024-09-03 PROCEDURE — G8420 CALC BMI NORM PARAMETERS: HCPCS | Performed by: INTERNAL MEDICINE

## 2024-09-03 PROCEDURE — 1090F PRES/ABSN URINE INCON ASSESS: CPT | Performed by: INTERNAL MEDICINE

## 2024-09-03 RX ORDER — CLONIDINE HYDROCHLORIDE 0.1 MG/1
0.1 TABLET ORAL 3 TIMES DAILY
Qty: 270 TABLET | Refills: 1 | Status: SHIPPED | OUTPATIENT
Start: 2024-09-03

## 2024-09-03 RX ORDER — HYDRALAZINE HYDROCHLORIDE 25 MG/1
25 TABLET, FILM COATED ORAL 3 TIMES DAILY
Qty: 270 TABLET | Refills: 0 | Status: SHIPPED | OUTPATIENT
Start: 2024-09-03 | End: 2024-12-02

## 2024-09-03 RX ORDER — LOSARTAN POTASSIUM 50 MG/1
TABLET ORAL
Qty: 180 TABLET | Refills: 1 | Status: SHIPPED | OUTPATIENT
Start: 2024-09-03

## 2024-09-03 RX ORDER — AMLODIPINE BESYLATE 2.5 MG/1
2.5 TABLET ORAL NIGHTLY
Qty: 30 TABLET | Refills: 2 | Status: SHIPPED | OUTPATIENT
Start: 2024-09-03

## 2024-09-03 RX ORDER — LEVOTHYROXINE SODIUM 50 UG/1
50 TABLET ORAL NIGHTLY
Qty: 90 TABLET | Refills: 1 | Status: SHIPPED | OUTPATIENT
Start: 2024-09-03

## 2024-09-03 ASSESSMENT — ENCOUNTER SYMPTOMS
SHORTNESS OF BREATH: 0
EYE PAIN: 0
NAUSEA: 0
BLOOD IN STOOL: 0
SINUS PAIN: 0
ABDOMINAL PAIN: 0
EYE DISCHARGE: 0
SORE THROAT: 0

## 2024-09-03 NOTE — PROGRESS NOTES
hyperlipidemia 02/21/2018     Overview Note:     Can not tolerate Statin      Myalgia 02/21/2018    PMR (polymyalgia rheumatica) ([facility]) 02/21/2018    Arthropathy of cervical facet joint 02/21/2018    Arthralgia of right hip 03/07/2016    Cerebral infarction ([facility])      Overview Note:     Replacing Inactive Diagnoses      History of TIA (transient ischemic attack) 07/20/2015    Right cataract 02/04/2014    Left cataract 08/27/2013    Sprain of acromioclavicular ligament 06/04/2012     Overview Note:     replace inactive diagnosis      Shoulder pain 06/04/2012    Trochanteric bursitis 11/01/2011    Hip pain 11/01/2011        Diagnosis:   1. Other hyperlipidemia  -     Comprehensive Metabolic Panel, Fasting; Future  -     Lipid, Fasting; Future  2. Essential hypertension  -     cloNIDine (CATAPRES) 0.1 MG tablet; Take 1 tablet by mouth in the morning, at noon, and at bedtime, Disp-270 tablet, R-1Normal  -     hydrALAZINE (APRESOLINE) 25 MG tablet; Take 1 tablet by mouth 3 times daily, Disp-270 tablet, R-0Normal  -     losartan (COZAAR) 50 MG tablet; TAKE 1 TABLET IN THE MORNING AND AT BEDTIME, Disp-180 tablet, R-1Normal  -     amLODIPine (NORVASC) 2.5 MG tablet; Take 1 tablet by mouth at bedtime, Disp-30 tablet, R-2Normal  -     CBC with Auto Differential; Future  -     Comprehensive Metabolic Panel, Fasting; Future  -     Lipid, Fasting; Future  -     TSH; Future  -     Urinalysis with Microscopic; Future  3. Other specified hypothyroidism  -     levothyroxine (SYNTHROID) 50 MCG tablet; Take 1 tablet by mouth at bedtime, Disp-90 tablet, R-1Normal  -     TSH; Future  4. Stage 3a chronic kidney disease (HCC)  -     Comprehensive Metabolic Panel, Fasting; Future  -     Vitamin D 25 Hydroxy; Future  5. PMR (polymyalgia rheumatica) ([facility])  -     Sedimentation Rate; Future  -     C-Reactive Protein; Future         PLAN:     BPis noted    Pt says - her furnace needs repair- found out today    Under stress    Her BP at home

## 2024-09-03 NOTE — PATIENT INSTRUCTIONS
The medication list included in this document is our record of what you are currently taking, including any changes that were made at today's visit.  If you find any differences when compared to your medications at home, or have any questions that were not answered at your visit, please contact the office.    Monitor BP and Keep a log, Bring it with next visit     Advise to have ( Fasting) lab test prior to next visit.

## 2024-09-20 DIAGNOSIS — I10 ESSENTIAL HYPERTENSION: ICD-10-CM

## 2024-09-23 RX ORDER — AMLODIPINE BESYLATE 2.5 MG/1
2.5 TABLET ORAL NIGHTLY
Qty: 30 TABLET | Refills: 2 | OUTPATIENT
Start: 2024-09-23

## 2024-10-04 NOTE — PATIENT INSTRUCTIONS
Patient notified via [employer] Portal   in your health, and be sure to contact your doctor if you have any problems.  Where can you learn more?  Go to [email or web address] and enter F075 to learn more about \"A Healthy Heart: Care Instructions.\"  Current as of: June 24, 2023               Content Version: 14.0  © 0881-5212 [employer].   Care instructions adapted under license by [employer]. If you have questions about a medical condition or this instruction, always ask your healthcare professional. [employer] disclaims any warranty or liability for your use of this information.      Personalized Preventive Plan for Maribeth Welch - 4/24/2024  Medicare offers a range of preventive health benefits. Some of the tests and screenings are paid in full while other may be subject to a deductible, co-insurance, and/or copay.    Some of these benefits include a comprehensive review of your medical history including lifestyle, illnesses that may run in your family, and various assessments and screenings as appropriate.    After reviewing your medical record and screening and assessments performed today your provider may have ordered immunizations, labs, imaging, and/or referrals for you.  A list of these orders (if applicable) as well as your Preventive Care list are included within your After Visit Summary for your review.    Other Preventive Recommendations:    A preventive eye exam performed by an eye specialist is recommended every 1-2 years to screen for glaucoma; cataracts, macular degeneration, and other eye disorders.  A preventive dental visit is recommended every 6 months.  Try to get at least 150 minutes of exercise per week or 10,000 steps per day on a pedometer .  Order or download the FREE \"Exercise & Physical Activity: Your Everyday Guide\" from [employer] [place] [employer]. Call 1-909.314.5999 or search [employer] [place] [employer] online.  You need 7278-3003 mg of calcium and 0113-1202 IU of

## 2024-11-16 DIAGNOSIS — I10 ESSENTIAL HYPERTENSION: ICD-10-CM

## 2024-11-18 RX ORDER — HYDRALAZINE HYDROCHLORIDE 25 MG/1
25 TABLET, FILM COATED ORAL 3 TIMES DAILY
Qty: 270 TABLET | Refills: 3 | OUTPATIENT
Start: 2024-11-18

## 2024-11-20 DIAGNOSIS — I10 ESSENTIAL HYPERTENSION: ICD-10-CM

## 2024-11-21 RX ORDER — AMLODIPINE BESYLATE 2.5 MG/1
2.5 TABLET ORAL NIGHTLY
Qty: 90 TABLET | Refills: 3 | OUTPATIENT
Start: 2024-11-21

## 2024-11-25 DIAGNOSIS — I10 ESSENTIAL HYPERTENSION: ICD-10-CM

## 2024-11-25 DIAGNOSIS — M35.3 PMR (POLYMYALGIA RHEUMATICA) (HCC): ICD-10-CM

## 2024-11-25 DIAGNOSIS — N18.31 STAGE 3A CHRONIC KIDNEY DISEASE (HCC): ICD-10-CM

## 2024-11-25 DIAGNOSIS — E78.49 OTHER HYPERLIPIDEMIA: ICD-10-CM

## 2024-11-25 DIAGNOSIS — E03.8 OTHER SPECIFIED HYPOTHYROIDISM: ICD-10-CM

## 2024-11-25 LAB
ALBUMIN: 4 G/DL (ref 3.5–5.2)
ALP BLD-CCNC: 96 U/L (ref 35–104)
ALT SERPL-CCNC: 13 U/L (ref 0–32)
ANION GAP SERPL CALCULATED.3IONS-SCNC: 10 MMOL/L (ref 7–16)
AST SERPL-CCNC: 24 U/L (ref 0–31)
BACTERIA: ABNORMAL
BASOPHILS ABSOLUTE: 0.02 K/UL (ref 0–0.2)
BASOPHILS RELATIVE PERCENT: 0 % (ref 0–2)
BILIRUB SERPL-MCNC: 0.3 MG/DL (ref 0–1.2)
BILIRUBIN, URINE: NEGATIVE
BUN BLDV-MCNC: 21 MG/DL (ref 6–23)
C-REACTIVE PROTEIN: <3 MG/L (ref 0–5)
CALCIUM SERPL-MCNC: 9.7 MG/DL (ref 8.6–10.2)
CHLORIDE BLD-SCNC: 104 MMOL/L (ref 98–107)
CHOLESTEROL, FASTING: 224 MG/DL
CO2: 27 MMOL/L (ref 22–29)
COLOR, UA: YELLOW
CREAT SERPL-MCNC: 1.1 MG/DL (ref 0.5–1)
EOSINOPHILS ABSOLUTE: 0.25 K/UL (ref 0.05–0.5)
EOSINOPHILS RELATIVE PERCENT: 3 % (ref 0–6)
GFR, ESTIMATED: 51 ML/MIN/1.73M2
GLUCOSE FASTING: 90 MG/DL (ref 74–99)
GLUCOSE URINE: NEGATIVE MG/DL
HCT VFR BLD CALC: 45 % (ref 34–48)
HDLC SERPL-MCNC: 48 MG/DL
HEMOGLOBIN: 14.1 G/DL (ref 11.5–15.5)
IMMATURE GRANULOCYTES %: 0 % (ref 0–5)
IMMATURE GRANULOCYTES ABSOLUTE: 0.03 K/UL (ref 0–0.58)
KETONES, URINE: NEGATIVE MG/DL
LDL CHOLESTEROL: 151 MG/DL
LEUKOCYTE ESTERASE, URINE: ABNORMAL
LYMPHOCYTES ABSOLUTE: 2.75 K/UL (ref 1.5–4)
LYMPHOCYTES RELATIVE PERCENT: 35 % (ref 20–42)
MCH RBC QN AUTO: 30.9 PG (ref 26–35)
MCHC RBC AUTO-ENTMCNC: 31.3 G/DL (ref 32–34.5)
MCV RBC AUTO: 98.5 FL (ref 80–99.9)
MONOCYTES ABSOLUTE: 0.66 K/UL (ref 0.1–0.95)
MONOCYTES RELATIVE PERCENT: 8 % (ref 2–12)
NEUTROPHILS ABSOLUTE: 4.13 K/UL (ref 1.8–7.3)
NEUTROPHILS RELATIVE PERCENT: 53 % (ref 43–80)
NITRITE, URINE: NEGATIVE
PDW BLD-RTO: 13.2 % (ref 11.5–15)
PH, URINE: 6.5 (ref 5–9)
PLATELET # BLD: 252 K/UL (ref 130–450)
PMV BLD AUTO: 9.7 FL (ref 7–12)
POTASSIUM SERPL-SCNC: 4.4 MMOL/L (ref 3.5–5)
PROTEIN UA: NEGATIVE MG/DL
RBC # BLD: 4.57 M/UL (ref 3.5–5.5)
RBC UA: ABNORMAL /HPF
SED RATE, AUTOMATED: 11 MM/HR (ref 0–20)
SODIUM BLD-SCNC: 141 MMOL/L (ref 132–146)
SPECIFIC GRAVITY UA: 1.01 (ref 1–1.03)
TOTAL PROTEIN: 6.9 G/DL (ref 6.4–8.3)
TRIGLYCERIDE, FASTING: 126 MG/DL
TSH SERPL DL<=0.05 MIU/L-ACNC: 2.41 UIU/ML (ref 0.27–4.2)
TURBIDITY: CLEAR
URINE HGB: NEGATIVE
UROBILINOGEN, URINE: 0.2 EU/DL (ref 0–1)
VITAMIN D 25-HYDROXY: 63.7 NG/ML (ref 30–100)
VLDLC SERPL CALC-MCNC: 25 MG/DL
WBC # BLD: 7.8 K/UL (ref 4.5–11.5)
WBC UA: ABNORMAL /HPF

## 2024-12-05 ENCOUNTER — OFFICE VISIT (OUTPATIENT)
Dept: FAMILY MEDICINE CLINIC | Age: 89
End: 2024-12-05
Payer: MEDICARE

## 2024-12-05 VITALS
DIASTOLIC BLOOD PRESSURE: 70 MMHG | HEART RATE: 68 BPM | TEMPERATURE: 97.8 F | SYSTOLIC BLOOD PRESSURE: 144 MMHG | OXYGEN SATURATION: 98 % | BODY MASS INDEX: 24.88 KG/M2 | WEIGHT: 135.2 LBS | HEIGHT: 62 IN

## 2024-12-05 DIAGNOSIS — E78.49 OTHER HYPERLIPIDEMIA: ICD-10-CM

## 2024-12-05 DIAGNOSIS — I65.21 STENOSIS OF RIGHT CAROTID ARTERY: ICD-10-CM

## 2024-12-05 DIAGNOSIS — I10 ESSENTIAL HYPERTENSION: ICD-10-CM

## 2024-12-05 DIAGNOSIS — R19.7 DIARRHEA, UNSPECIFIED TYPE: Primary | ICD-10-CM

## 2024-12-05 DIAGNOSIS — N18.31 STAGE 3A CHRONIC KIDNEY DISEASE (HCC): ICD-10-CM

## 2024-12-05 DIAGNOSIS — M35.3 PMR (POLYMYALGIA RHEUMATICA) (HCC): ICD-10-CM

## 2024-12-05 DIAGNOSIS — R19.7 DIARRHEA, UNSPECIFIED TYPE: ICD-10-CM

## 2024-12-05 DIAGNOSIS — E03.8 OTHER SPECIFIED HYPOTHYROIDISM: ICD-10-CM

## 2024-12-05 PROCEDURE — 1159F MED LIST DOCD IN RCRD: CPT | Performed by: INTERNAL MEDICINE

## 2024-12-05 PROCEDURE — 1090F PRES/ABSN URINE INCON ASSESS: CPT | Performed by: INTERNAL MEDICINE

## 2024-12-05 PROCEDURE — G8427 DOCREV CUR MEDS BY ELIG CLIN: HCPCS | Performed by: INTERNAL MEDICINE

## 2024-12-05 PROCEDURE — 1036F TOBACCO NON-USER: CPT | Performed by: INTERNAL MEDICINE

## 2024-12-05 PROCEDURE — G8484 FLU IMMUNIZE NO ADMIN: HCPCS | Performed by: INTERNAL MEDICINE

## 2024-12-05 PROCEDURE — 99214 OFFICE O/P EST MOD 30 MIN: CPT | Performed by: INTERNAL MEDICINE

## 2024-12-05 PROCEDURE — 1123F ACP DISCUSS/DSCN MKR DOCD: CPT | Performed by: INTERNAL MEDICINE

## 2024-12-05 PROCEDURE — G8420 CALC BMI NORM PARAMETERS: HCPCS | Performed by: INTERNAL MEDICINE

## 2024-12-05 RX ORDER — AMLODIPINE BESYLATE 2.5 MG/1
2.5 TABLET ORAL NIGHTLY
Qty: 90 TABLET | Refills: 1 | Status: SHIPPED | OUTPATIENT
Start: 2024-12-05

## 2024-12-05 RX ORDER — HYDRALAZINE HYDROCHLORIDE 25 MG/1
25 TABLET, FILM COATED ORAL 3 TIMES DAILY
Qty: 270 TABLET | Refills: 0 | Status: SHIPPED | OUTPATIENT
Start: 2024-12-05 | End: 2025-03-05

## 2024-12-05 RX ORDER — VIT C/B6/B5/MAGNESIUM/HERB 173 50-5-6-5MG
500 CAPSULE ORAL DAILY
COMMUNITY

## 2024-12-05 RX ORDER — CLONIDINE HYDROCHLORIDE 0.1 MG/1
0.1 TABLET ORAL 3 TIMES DAILY
Qty: 270 TABLET | Refills: 1 | Status: SHIPPED | OUTPATIENT
Start: 2024-12-05

## 2024-12-05 ASSESSMENT — ENCOUNTER SYMPTOMS
ABDOMINAL PAIN: 0
EYE PAIN: 0
SINUS PAIN: 0
SORE THROAT: 0
SHORTNESS OF BREATH: 0
NAUSEA: 0
BLOOD IN STOOL: 0
EYE DISCHARGE: 0

## 2024-12-05 NOTE — PROGRESS NOTES
with the patient      There are no Patient Instructions on file for this visit.    Return in about 3 months (around 3/3/2025).

## 2024-12-10 LAB
GLIADIN DEAMINIDATED PEPTIDE AB IGA: 0.6 U/ML
GLIADIN DEAMINIDATED PEPTIDE AB IGG: <0.4 U/ML
IGA: NORMAL MG/DL
TISSUE TRANSGLUTAMINASE IGA: 0.3 U/ML

## 2024-12-13 DIAGNOSIS — R19.7 DIARRHEA, UNSPECIFIED TYPE: ICD-10-CM

## 2024-12-14 LAB
C DIFF AG + TOXIN: NEGATIVE
SPECIMEN DESCRIPTION: NORMAL

## 2024-12-18 ENCOUNTER — TELEPHONE (OUTPATIENT)
Dept: INTERVENTIONAL RADIOLOGY/VASCULAR | Age: 89
End: 2024-12-18

## 2024-12-18 NOTE — TELEPHONE ENCOUNTER
Spoke with patient and confirmed vascular testing appointment on 12/19/2024 at 1:00 pm. Instructed patient to arrive 15 minutes prior to appointment time, Enter through Entrance B, register to right of entrance, and report to Cardiac Services for test. Patient verbalized understanding. Questions answered.

## 2024-12-19 ENCOUNTER — HOSPITAL ENCOUNTER (OUTPATIENT)
Dept: INTERVENTIONAL RADIOLOGY/VASCULAR | Age: 89
Discharge: HOME | End: 2024-12-21
Payer: MEDICARE

## 2024-12-19 DIAGNOSIS — I73.9 PERIPHERAL VASCULAR DISEASE, UNSPECIFIED (HCC): ICD-10-CM

## 2024-12-19 PROCEDURE — 93923 UPR/LXTR ART STDY 3+ LVLS: CPT

## 2025-01-31 DIAGNOSIS — I10 ESSENTIAL HYPERTENSION: ICD-10-CM

## 2025-01-31 RX ORDER — LOSARTAN POTASSIUM 50 MG/1
TABLET ORAL
Qty: 180 TABLET | Refills: 3 | OUTPATIENT
Start: 2025-01-31

## 2025-02-09 DIAGNOSIS — E03.8 OTHER SPECIFIED HYPOTHYROIDISM: ICD-10-CM

## 2025-02-10 RX ORDER — LEVOTHYROXINE SODIUM 50 UG/1
50 TABLET ORAL NIGHTLY
Qty: 90 TABLET | Refills: 3 | OUTPATIENT
Start: 2025-02-10

## 2025-02-17 DIAGNOSIS — I10 ESSENTIAL HYPERTENSION: ICD-10-CM

## 2025-02-17 RX ORDER — HYDRALAZINE HYDROCHLORIDE 25 MG/1
25 TABLET, FILM COATED ORAL 3 TIMES DAILY
Qty: 270 TABLET | Refills: 3 | OUTPATIENT
Start: 2025-02-17

## 2025-03-07 SDOH — ECONOMIC STABILITY: FOOD INSECURITY: WITHIN THE PAST 12 MONTHS, YOU WORRIED THAT YOUR FOOD WOULD RUN OUT BEFORE YOU GOT MONEY TO BUY MORE.: NEVER TRUE

## 2025-03-07 SDOH — ECONOMIC STABILITY: FOOD INSECURITY: WITHIN THE PAST 12 MONTHS, THE FOOD YOU BOUGHT JUST DIDN'T LAST AND YOU DIDN'T HAVE MONEY TO GET MORE.: NEVER TRUE

## 2025-03-07 SDOH — ECONOMIC STABILITY: INCOME INSECURITY: IN THE LAST 12 MONTHS, WAS THERE A TIME WHEN YOU WERE NOT ABLE TO PAY THE MORTGAGE OR RENT ON TIME?: NO

## 2025-03-07 ASSESSMENT — PATIENT HEALTH QUESTIONNAIRE - PHQ9
SUM OF ALL RESPONSES TO PHQ QUESTIONS 1-9: 0
2. FEELING DOWN, DEPRESSED OR HOPELESS: NOT AT ALL
2. FEELING DOWN, DEPRESSED OR HOPELESS: NOT AT ALL
SUM OF ALL RESPONSES TO PHQ QUESTIONS 1-9: 0
1. LITTLE INTEREST OR PLEASURE IN DOING THINGS: NOT AT ALL
SUM OF ALL RESPONSES TO PHQ QUESTIONS 1-9: 0
1. LITTLE INTEREST OR PLEASURE IN DOING THINGS: NOT AT ALL
SUM OF ALL RESPONSES TO PHQ QUESTIONS 1-9: 0
SUM OF ALL RESPONSES TO PHQ9 QUESTIONS 1 & 2: 0

## 2025-03-10 ENCOUNTER — OFFICE VISIT (OUTPATIENT)
Dept: FAMILY MEDICINE CLINIC | Age: 89
End: 2025-03-10
Payer: MEDICARE

## 2025-03-10 VITALS
BODY MASS INDEX: 24.53 KG/M2 | SYSTOLIC BLOOD PRESSURE: 184 MMHG | TEMPERATURE: 97.5 F | OXYGEN SATURATION: 97 % | WEIGHT: 133.3 LBS | DIASTOLIC BLOOD PRESSURE: 70 MMHG | HEART RATE: 57 BPM | HEIGHT: 62 IN

## 2025-03-10 DIAGNOSIS — Z91.81 HISTORY OF RECENT FALL: ICD-10-CM

## 2025-03-10 DIAGNOSIS — E78.49 OTHER HYPERLIPIDEMIA: ICD-10-CM

## 2025-03-10 DIAGNOSIS — I10 ESSENTIAL HYPERTENSION: Primary | ICD-10-CM

## 2025-03-10 DIAGNOSIS — M35.3 PMR (POLYMYALGIA RHEUMATICA): ICD-10-CM

## 2025-03-10 DIAGNOSIS — E03.8 OTHER SPECIFIED HYPOTHYROIDISM: ICD-10-CM

## 2025-03-10 DIAGNOSIS — N18.31 STAGE 3A CHRONIC KIDNEY DISEASE (HCC): ICD-10-CM

## 2025-03-10 PROBLEM — N18.32 STAGE 3B CHRONIC KIDNEY DISEASE (HCC): Status: RESOLVED | Noted: 2022-09-13 | Resolved: 2025-03-10

## 2025-03-10 PROCEDURE — 1090F PRES/ABSN URINE INCON ASSESS: CPT | Performed by: INTERNAL MEDICINE

## 2025-03-10 PROCEDURE — 1036F TOBACCO NON-USER: CPT | Performed by: INTERNAL MEDICINE

## 2025-03-10 PROCEDURE — 1123F ACP DISCUSS/DSCN MKR DOCD: CPT | Performed by: INTERNAL MEDICINE

## 2025-03-10 PROCEDURE — G8427 DOCREV CUR MEDS BY ELIG CLIN: HCPCS | Performed by: INTERNAL MEDICINE

## 2025-03-10 PROCEDURE — 1159F MED LIST DOCD IN RCRD: CPT | Performed by: INTERNAL MEDICINE

## 2025-03-10 PROCEDURE — 99214 OFFICE O/P EST MOD 30 MIN: CPT | Performed by: INTERNAL MEDICINE

## 2025-03-10 PROCEDURE — G8420 CALC BMI NORM PARAMETERS: HCPCS | Performed by: INTERNAL MEDICINE

## 2025-03-10 RX ORDER — PREDNISONE 1 MG/1
4 TABLET ORAL DAILY
Qty: 360 TABLET | Refills: 0 | Status: SHIPPED | OUTPATIENT
Start: 2025-03-10 | End: 2025-06-08

## 2025-03-10 RX ORDER — AMLODIPINE BESYLATE 5 MG/1
5 TABLET ORAL NIGHTLY
Qty: 90 TABLET | Refills: 0 | Status: SHIPPED | OUTPATIENT
Start: 2025-03-10 | End: 2025-06-08

## 2025-03-10 RX ORDER — LEVOTHYROXINE SODIUM 50 UG/1
50 TABLET ORAL NIGHTLY
Qty: 90 TABLET | Refills: 1 | Status: SHIPPED | OUTPATIENT
Start: 2025-03-10

## 2025-03-10 RX ORDER — HYDRALAZINE HYDROCHLORIDE 25 MG/1
25 TABLET, FILM COATED ORAL 3 TIMES DAILY
Qty: 270 TABLET | Refills: 0 | Status: SHIPPED | OUTPATIENT
Start: 2025-03-10 | End: 2025-06-08

## 2025-03-10 RX ORDER — RIVAROXABAN 15 MG/1
TABLET, FILM COATED ORAL
COMMUNITY
Start: 2025-02-25

## 2025-03-10 RX ORDER — LOSARTAN POTASSIUM 50 MG/1
TABLET ORAL
Qty: 180 TABLET | Refills: 1 | Status: SHIPPED | OUTPATIENT
Start: 2025-03-10

## 2025-03-10 RX ORDER — HYDROQUINONE 40 MG/G
CREAM TOPICAL 2 TIMES DAILY
COMMUNITY

## 2025-03-10 SDOH — ECONOMIC STABILITY: FOOD INSECURITY: WITHIN THE PAST 12 MONTHS, YOU WORRIED THAT YOUR FOOD WOULD RUN OUT BEFORE YOU GOT MONEY TO BUY MORE.: NEVER TRUE

## 2025-03-10 SDOH — ECONOMIC STABILITY: FOOD INSECURITY: WITHIN THE PAST 12 MONTHS, THE FOOD YOU BOUGHT JUST DIDN'T LAST AND YOU DIDN'T HAVE MONEY TO GET MORE.: NEVER TRUE

## 2025-03-10 ASSESSMENT — ENCOUNTER SYMPTOMS
SINUS PAIN: 0
SHORTNESS OF BREATH: 0
EYE PAIN: 0
EYE DISCHARGE: 0
ABDOMINAL PAIN: 0
SORE THROAT: 0
NAUSEA: 0
BLOOD IN STOOL: 0

## 2025-03-10 NOTE — PROGRESS NOTES
Chief Complaint   Patient presents with    Hypertension     Pt here for follow up on hypertension, pt reports feeling good.     Medication Refill    Health Maintenance     AWV- due, Shingles vaccine        HPI:  Patient is here for follow-up     Feeling okay    Needs Rf    BP - rechecked    Pt says it is in 140's/70-80's at home    Pt is now on 4 mg prednisone  ( 3 mg - pt was symptomatic)         Allergy and Medications are reviewed and updated.  Past Medical History, Surgical History, and Family History has been reviewed and updated.    Review of Systems:  Review of Systems   Constitutional:  Negative for chills and fever.   HENT:  Negative for congestion, sinus pain and sore throat.    Eyes:  Negative for pain and discharge.   Respiratory:  Negative for shortness of breath (No new SOb).    Cardiovascular:  Negative for chest pain.   Gastrointestinal:  Negative for abdominal pain, blood in stool and nausea.   Genitourinary:  Negative for flank pain and frequency.   Musculoskeletal:  Negative for neck pain.   Hematological:  Does not bruise/bleed easily.   Psychiatric/Behavioral:  Negative for suicidal ideas.          Vitals:    03/10/25 1357 03/10/25 1408   BP: (!) 172/68 (!) 184/70   BP Site: Left Upper Arm Left Upper Arm   Patient Position: Sitting Sitting   Pulse: 57    Temp: 97.5 °F (36.4 °C)    TempSrc: Temporal    SpO2: 97%    Weight: 60.5 kg (133 lb 4.8 oz)    Height: 1.575 m (5' 2\")        Physical Exam  Vitals reviewed.   Constitutional:       Appearance: She is well-developed.   HENT:      Head: Normocephalic and atraumatic.      Nose: Nose normal.   Eyes:      Conjunctiva/sclera: Conjunctivae normal.      Pupils: Pupils are equal, round, and reactive to light.   Neck:      Vascular: No JVD.   Cardiovascular:      Rate and Rhythm: Normal rate and regular rhythm.   Pulmonary:      Effort: Pulmonary effort is normal.      Breath sounds: Normal breath sounds.   Abdominal:      General: Bowel sounds are

## 2025-03-14 DIAGNOSIS — M35.3 PMR (POLYMYALGIA RHEUMATICA): ICD-10-CM

## 2025-03-14 LAB
C-REACTIVE PROTEIN: <3 MG/L (ref 0–5)
SED RATE, AUTOMATED: 19 MM/HR (ref 0–20)

## 2025-03-25 ENCOUNTER — OFFICE VISIT (OUTPATIENT)
Dept: FAMILY MEDICINE CLINIC | Age: 89
End: 2025-03-25
Payer: MEDICARE

## 2025-03-25 VITALS
SYSTOLIC BLOOD PRESSURE: 142 MMHG | DIASTOLIC BLOOD PRESSURE: 70 MMHG | BODY MASS INDEX: 24.05 KG/M2 | HEIGHT: 62 IN | WEIGHT: 130.7 LBS

## 2025-03-25 DIAGNOSIS — I10 ESSENTIAL HYPERTENSION: Primary | ICD-10-CM

## 2025-03-25 DIAGNOSIS — E78.49 OTHER HYPERLIPIDEMIA: ICD-10-CM

## 2025-03-25 DIAGNOSIS — E03.8 OTHER SPECIFIED HYPOTHYROIDISM: ICD-10-CM

## 2025-03-25 DIAGNOSIS — R19.7 DIARRHEA, UNSPECIFIED TYPE: ICD-10-CM

## 2025-03-25 DIAGNOSIS — N18.31 STAGE 3A CHRONIC KIDNEY DISEASE (HCC): ICD-10-CM

## 2025-03-25 PROCEDURE — G8420 CALC BMI NORM PARAMETERS: HCPCS | Performed by: INTERNAL MEDICINE

## 2025-03-25 PROCEDURE — 1036F TOBACCO NON-USER: CPT | Performed by: INTERNAL MEDICINE

## 2025-03-25 PROCEDURE — 1090F PRES/ABSN URINE INCON ASSESS: CPT | Performed by: INTERNAL MEDICINE

## 2025-03-25 PROCEDURE — G8427 DOCREV CUR MEDS BY ELIG CLIN: HCPCS | Performed by: INTERNAL MEDICINE

## 2025-03-25 PROCEDURE — 99214 OFFICE O/P EST MOD 30 MIN: CPT | Performed by: INTERNAL MEDICINE

## 2025-03-25 PROCEDURE — 1123F ACP DISCUSS/DSCN MKR DOCD: CPT | Performed by: INTERNAL MEDICINE

## 2025-03-25 PROCEDURE — 1159F MED LIST DOCD IN RCRD: CPT | Performed by: INTERNAL MEDICINE

## 2025-03-25 ASSESSMENT — ENCOUNTER SYMPTOMS
ABDOMINAL PAIN: 0
SHORTNESS OF BREATH: 0
EYE DISCHARGE: 0
SINUS PAIN: 0
BLOOD IN STOOL: 0
NAUSEA: 0
EYE PAIN: 0
SORE THROAT: 0

## 2025-03-25 NOTE — PROGRESS NOTES
Chief Complaint   Patient presents with    Hypertension     Pt here for follow up on hypertension, pt reports feeling good.     Discuss Labs     Completed on 03/14/2025        HPI:  Patient is here for follow-up     Has ch diarrhea    BP readings noted      Allergy and Medications are reviewed and updated.  Past Medical History, Surgical History, and Family History has been reviewed and updated.    Review of Systems:  Review of Systems   Constitutional:  Negative for chills and fever.   HENT:  Negative for congestion, sinus pain and sore throat.    Eyes:  Negative for pain and discharge.   Respiratory:  Negative for shortness of breath (No new SOb).    Cardiovascular:  Negative for chest pain.   Gastrointestinal:  Negative for abdominal pain, blood in stool and nausea.   Genitourinary:  Negative for flank pain and frequency.   Musculoskeletal:  Negative for neck pain.   Hematological:  Does not bruise/bleed easily.   Psychiatric/Behavioral:  Negative for suicidal ideas.          Vitals:    03/25/25 0935 03/25/25 0938 03/25/25 0942 03/25/25 0944   BP: (!) 157/91 (!) 172/60 129/64 (!) 142/70   BP Site: Left Wrist Left Upper Arm     Patient Position: Sitting      Weight: 59.3 kg (130 lb 11.2 oz)      Height: 1.575 m (5' 2\")          Physical Exam  Vitals reviewed.   Constitutional:       Appearance: She is well-developed.   HENT:      Head: Normocephalic and atraumatic.      Nose: Nose normal.   Eyes:      Conjunctiva/sclera: Conjunctivae normal.      Pupils: Pupils are equal, round, and reactive to light.   Neck:      Vascular: No JVD.   Cardiovascular:      Rate and Rhythm: Normal rate and regular rhythm.   Pulmonary:      Effort: Pulmonary effort is normal.      Breath sounds: Normal breath sounds.   Abdominal:      General: Bowel sounds are normal.      Palpations: Abdomen is soft.   Musculoskeletal:         General: Normal range of motion.   Skin:     General: Skin is warm and dry.   Neurological:      Mental

## 2025-04-08 LAB
ALBUMIN SERPL-MCNC: 4.5 G/DL (ref 3.5–5.2)
ALP SERPL-CCNC: 110 U/L (ref 35–104)
ALT SERPL-CCNC: 18 U/L (ref 0–32)
ANION GAP SERPL CALCULATED.3IONS-SCNC: 17 MMOL/L (ref 7–16)
AST SERPL-CCNC: 25 U/L (ref 0–31)
BASOPHILS # BLD: 0.02 K/UL (ref 0–0.2)
BASOPHILS NFR BLD: 0 % (ref 0–2)
BILIRUB SERPL-MCNC: 0.2 MG/DL (ref 0–1.2)
BUN SERPL-MCNC: 25 MG/DL (ref 6–23)
CALCIUM SERPL-MCNC: 10.9 MG/DL (ref 8.6–10.2)
CHLORIDE SERPL-SCNC: 100 MMOL/L (ref 98–107)
CO2 SERPL-SCNC: 22 MMOL/L (ref 22–29)
CREAT SERPL-MCNC: 1.1 MG/DL (ref 0.5–1)
EOSINOPHIL # BLD: 0.05 K/UL (ref 0.05–0.5)
EOSINOPHILS RELATIVE PERCENT: 1 % (ref 0–6)
ERYTHROCYTE [DISTWIDTH] IN BLOOD BY AUTOMATED COUNT: 13.3 % (ref 11.5–15)
GFR, ESTIMATED: 47 ML/MIN/1.73M2
GLUCOSE SERPL-MCNC: 145 MG/DL (ref 74–99)
HCT VFR BLD AUTO: 45.7 % (ref 34–48)
HGB BLD-MCNC: 14.3 G/DL (ref 11.5–15.5)
IMM GRANULOCYTES # BLD AUTO: 0.03 K/UL (ref 0–0.58)
IMM GRANULOCYTES NFR BLD: 0 % (ref 0–5)
LYMPHOCYTES NFR BLD: 1.67 K/UL (ref 1.5–4)
LYMPHOCYTES RELATIVE PERCENT: 18 % (ref 20–42)
MCH RBC QN AUTO: 30.5 PG (ref 26–35)
MCHC RBC AUTO-ENTMCNC: 31.3 G/DL (ref 32–34.5)
MCV RBC AUTO: 97.4 FL (ref 80–99.9)
MONOCYTES NFR BLD: 0.54 K/UL (ref 0.1–0.95)
MONOCYTES NFR BLD: 6 % (ref 2–12)
NEUTROPHILS NFR BLD: 76 % (ref 43–80)
NEUTS SEG NFR BLD: 7.11 K/UL (ref 1.8–7.3)
PLATELET # BLD AUTO: 272 K/UL (ref 130–450)
PMV BLD AUTO: 10.1 FL (ref 7–12)
POTASSIUM SERPL-SCNC: 4 MMOL/L (ref 3.5–5)
PROT SERPL-MCNC: 7.4 G/DL (ref 6.4–8.3)
RBC # BLD AUTO: 4.69 M/UL (ref 3.5–5.5)
SODIUM SERPL-SCNC: 139 MMOL/L (ref 132–146)
WBC OTHER # BLD: 9.4 K/UL (ref 4.5–11.5)

## 2025-04-10 LAB
GLIADIN IGA SER IA-ACNC: 0.8 U/ML
GLIADIN IGG SER IA-ACNC: <0.4 U/ML
IGA SERPL-MCNC: 187 MG/DL (ref 70–400)
TTG IGA SER IA-ACNC: 0.3 U/ML

## 2025-05-23 DIAGNOSIS — I10 ESSENTIAL HYPERTENSION: ICD-10-CM

## 2025-05-23 DIAGNOSIS — M35.3 PMR (POLYMYALGIA RHEUMATICA): ICD-10-CM

## 2025-05-23 RX ORDER — HYDRALAZINE HYDROCHLORIDE 25 MG/1
25 TABLET, FILM COATED ORAL 3 TIMES DAILY
Qty: 270 TABLET | Refills: 3 | OUTPATIENT
Start: 2025-05-23

## 2025-05-23 RX ORDER — PREDNISONE 1 MG/1
TABLET ORAL
Qty: 360 TABLET | Refills: 3 | OUTPATIENT
Start: 2025-05-23

## 2025-06-06 DIAGNOSIS — I10 ESSENTIAL HYPERTENSION: ICD-10-CM

## 2025-06-06 RX ORDER — AMLODIPINE BESYLATE 5 MG/1
5 TABLET ORAL NIGHTLY
Qty: 90 TABLET | Refills: 0 | Status: SHIPPED | OUTPATIENT
Start: 2025-06-06 | End: 2025-09-04

## 2025-06-06 RX ORDER — CLONIDINE HYDROCHLORIDE 0.1 MG/1
0.1 TABLET ORAL 3 TIMES DAILY
Qty: 270 TABLET | Refills: 1 | Status: SHIPPED | OUTPATIENT
Start: 2025-06-06

## 2025-06-06 NOTE — TELEPHONE ENCOUNTER
Please send to [facility]    Name of Medication(s) Requested:  Requested Prescriptions     Pending Prescriptions Disp Refills    amLODIPine (NORVASC) 5 MG tablet 90 tablet 0     Sig: Take 1 tablet by mouth at bedtime       Medication is on current medication list Yes    Dosage and directions were verified? Yes    Quantity verified: 90 day supply     Pharmacy Verified?  Yes    Last Appointment:  3/25/2025    Future appts:  Future Appointments   Date Time Provider Department Center   6/18/2025 10:45 AM [facility] HOLOGIC [facility] Radiol[facility]   7/2/2025 10:00 AM Alberto Vang MD [facility] E[facility] DEP        (If no appt send self scheduling link. .REFILLAPPT)  Scheduling request sent?     [] Yes  [x] No    Does patient need updated?  [] Yes  [x] No

## 2025-06-06 NOTE — TELEPHONE ENCOUNTER
Please send locally to [facility]      Name of Medication(s) Requested:  Requested Prescriptions     Pending Prescriptions Disp Refills    cloNIDine (CATAPRES) 0.1 MG tablet 270 tablet 1     Sig: Take 1 tablet by mouth in the morning, at noon, and at bedtime       Medication is on current medication list Yes    Dosage and directions were verified? Yes    Quantity verified: 90 day supply     Pharmacy Verified?  Yes    Last Appointment:  3/25/2025    Future appts:  Future Appointments   Date Time Provider Department Center   6/18/2025 10:45 [facility]   7/2/2025 10:00 AM Alberto Vang MD [facility] E[facility] DEP        (If no appt send self scheduling link. .REFILLAPPT)  Scheduling request sent?     [] Yes  [x] No    Does patient need updated?  [] Yes  [x] No

## 2025-06-16 ENCOUNTER — TELEPHONE (OUTPATIENT)
Dept: FAMILY MEDICINE CLINIC | Age: 89
End: 2025-06-16

## 2025-06-16 DIAGNOSIS — Z12.31 ENCOUNTER FOR SCREENING MAMMOGRAM FOR MALIGNANT NEOPLASM OF BREAST: Primary | ICD-10-CM

## 2025-06-16 NOTE — TELEPHONE ENCOUNTER
Pt needs screening mammogram ordered for upcoming 6/18 mammogram.     Last seen 3/25/2025  Next appt Visit date not found

## 2025-06-18 ENCOUNTER — HOSPITAL ENCOUNTER (OUTPATIENT)
Dept: MAMMOGRAPHY | Age: 89
Discharge: HOME | End: 2025-06-20
Payer: MEDICARE

## 2025-06-18 DIAGNOSIS — Z12.31 ENCOUNTER FOR SCREENING MAMMOGRAM FOR MALIGNANT NEOPLASM OF BREAST: ICD-10-CM

## 2025-06-18 PROCEDURE — 77063 BREAST TOMOSYNTHESIS BI: CPT

## 2025-07-16 DIAGNOSIS — I10 ESSENTIAL HYPERTENSION: ICD-10-CM

## 2025-07-16 NOTE — TELEPHONE ENCOUNTER
Name of Medication(s) Requested:  Requested Prescriptions     Pending Prescriptions Disp Refills    hydrALAZINE (APRESOLINE) 25 MG tablet 90 tablet 0     Sig: Take 1 tablet by mouth 3 times daily       Medication is on current medication list Yes    Dosage and directions were verified? Yes    Quantity verified: 30 day supply     Pharmacy Verified?  Yes    Last Appointment:  3/25/2025    Future appts:  Future Appointments   Date Time Provider Department Center   8/21/2025  9:30 AM Alberto Vang MD [facility] DEP        (If no appt send self scheduling link. .REFILLAPPT)  Scheduling request sent?     [] Yes  [x] No    Does patient need updated?  [] Yes  [x] No

## 2025-07-17 RX ORDER — HYDRALAZINE HYDROCHLORIDE 25 MG/1
25 TABLET, FILM COATED ORAL 3 TIMES DAILY
Qty: 90 TABLET | Refills: 0 | Status: SHIPPED | OUTPATIENT
Start: 2025-07-17 | End: 2025-10-15

## 2025-08-14 DIAGNOSIS — N18.31 STAGE 3A CHRONIC KIDNEY DISEASE (HCC): ICD-10-CM

## 2025-08-14 DIAGNOSIS — E03.8 OTHER SPECIFIED HYPOTHYROIDISM: ICD-10-CM

## 2025-08-14 DIAGNOSIS — I10 ESSENTIAL HYPERTENSION: ICD-10-CM

## 2025-08-14 DIAGNOSIS — E78.49 OTHER HYPERLIPIDEMIA: ICD-10-CM

## 2025-08-14 LAB
ALBUMIN: 4 G/DL (ref 3.5–5.2)
ALP BLD-CCNC: 112 U/L (ref 35–104)
ALT SERPL-CCNC: 12 U/L (ref 0–35)
ANION GAP SERPL CALCULATED.3IONS-SCNC: 12 MMOL/L (ref 7–16)
AST SERPL-CCNC: 26 U/L (ref 0–35)
BACTERIA: ABNORMAL
BASOPHILS ABSOLUTE: 0.02 K/UL (ref 0–0.2)
BASOPHILS RELATIVE PERCENT: 0 % (ref 0–2)
BILIRUB SERPL-MCNC: 0.3 MG/DL (ref 0–1.2)
BILIRUBIN, URINE: NEGATIVE
BUN BLDV-MCNC: 28 MG/DL (ref 8–23)
CALCIUM SERPL-MCNC: 10.4 MG/DL (ref 8.8–10.2)
CHLORIDE BLD-SCNC: 105 MMOL/L (ref 98–107)
CHOLESTEROL, FASTING: 201 MG/DL
CO2: 24 MMOL/L (ref 22–29)
COLOR, UA: YELLOW
CREAT SERPL-MCNC: 1 MG/DL (ref 0.5–1)
EOSINOPHILS ABSOLUTE: 0.27 K/UL (ref 0.05–0.5)
EOSINOPHILS RELATIVE PERCENT: 4 % (ref 0–6)
GFR, ESTIMATED: 52 ML/MIN/1.73M2
GLUCOSE FASTING: 87 MG/DL (ref 74–99)
GLUCOSE URINE: NEGATIVE MG/DL
HCT VFR BLD CALC: 43.6 % (ref 34–48)
HDLC SERPL-MCNC: 57 MG/DL
HEMOGLOBIN: 13.8 G/DL (ref 11.5–15.5)
IMMATURE GRANULOCYTES %: 0 % (ref 0–5)
IMMATURE GRANULOCYTES ABSOLUTE: 0.03 K/UL (ref 0–0.58)
KETONES, URINE: NEGATIVE MG/DL
LDL CHOLESTEROL: 124 MG/DL
LEUKOCYTE ESTERASE, URINE: ABNORMAL
LYMPHOCYTES ABSOLUTE: 2.68 K/UL (ref 1.5–4)
LYMPHOCYTES RELATIVE PERCENT: 35 % (ref 20–42)
MCH RBC QN AUTO: 30.9 PG (ref 26–35)
MCHC RBC AUTO-ENTMCNC: 31.7 G/DL (ref 32–34.5)
MCV RBC AUTO: 97.5 FL (ref 80–99.9)
MONOCYTES ABSOLUTE: 0.64 K/UL (ref 0.1–0.95)
MONOCYTES RELATIVE PERCENT: 8 % (ref 2–12)
NEUTROPHILS ABSOLUTE: 3.97 K/UL (ref 1.8–7.3)
NEUTROPHILS RELATIVE PERCENT: 52 % (ref 43–80)
NITRITE, URINE: NEGATIVE
PDW BLD-RTO: 13.2 % (ref 11.5–15)
PH, URINE: 6.5 (ref 5–8)
PLATELET # BLD: 245 K/UL (ref 130–450)
PMV BLD AUTO: 9.8 FL (ref 7–12)
POTASSIUM SERPL-SCNC: 4.4 MMOL/L (ref 3.5–5.1)
PROTEIN UA: NEGATIVE MG/DL
RBC # BLD: 4.47 M/UL (ref 3.5–5.5)
RBC UA: ABNORMAL /HPF
SODIUM BLD-SCNC: 140 MMOL/L (ref 136–145)
SPECIFIC GRAVITY UA: 1.01 (ref 1–1.03)
TOTAL PROTEIN: 7 G/DL (ref 6.4–8.3)
TRIGLYCERIDE, FASTING: 98 MG/DL
TSH SERPL DL<=0.05 MIU/L-ACNC: 2.92 UIU/ML (ref 0.27–4.2)
TURBIDITY: CLEAR
URINE HGB: NEGATIVE
UROBILINOGEN, URINE: 0.2 EU/DL (ref 0–1)
VLDLC SERPL CALC-MCNC: 20 MG/DL
WBC # BLD: 7.6 K/UL (ref 4.5–11.5)
WBC UA: ABNORMAL /HPF

## 2025-08-19 DIAGNOSIS — I10 ESSENTIAL HYPERTENSION: ICD-10-CM

## 2025-08-19 RX ORDER — HYDRALAZINE HYDROCHLORIDE 25 MG/1
25 TABLET, FILM COATED ORAL 3 TIMES DAILY
Qty: 90 TABLET | Refills: 0 | Status: SHIPPED | OUTPATIENT
Start: 2025-08-19 | End: 2025-11-17

## 2025-08-20 DIAGNOSIS — E03.8 OTHER SPECIFIED HYPOTHYROIDISM: ICD-10-CM

## 2025-08-20 DIAGNOSIS — I10 ESSENTIAL HYPERTENSION: ICD-10-CM

## 2025-08-20 RX ORDER — LOSARTAN POTASSIUM 50 MG/1
TABLET ORAL
Qty: 180 TABLET | Refills: 1 | Status: SHIPPED | OUTPATIENT
Start: 2025-08-20

## 2025-08-20 RX ORDER — LEVOTHYROXINE SODIUM 50 UG/1
50 TABLET ORAL NIGHTLY
Qty: 90 TABLET | Refills: 1 | Status: SHIPPED | OUTPATIENT
Start: 2025-08-20

## 2025-08-25 DIAGNOSIS — I10 ESSENTIAL HYPERTENSION: ICD-10-CM

## 2025-08-25 DIAGNOSIS — E03.8 OTHER SPECIFIED HYPOTHYROIDISM: ICD-10-CM

## 2025-08-25 RX ORDER — HYDRALAZINE HYDROCHLORIDE 25 MG/1
25 TABLET, FILM COATED ORAL 3 TIMES DAILY
Qty: 270 TABLET | Refills: 1 | Status: SHIPPED | OUTPATIENT
Start: 2025-08-25

## 2025-08-25 RX ORDER — LEVOTHYROXINE SODIUM 50 UG/1
50 TABLET ORAL NIGHTLY
Qty: 90 TABLET | Refills: 1 | Status: SHIPPED | OUTPATIENT
Start: 2025-08-25

## 2025-08-25 RX ORDER — AMLODIPINE BESYLATE 5 MG/1
5 TABLET ORAL NIGHTLY
Qty: 90 TABLET | Refills: 1 | Status: SHIPPED | OUTPATIENT
Start: 2025-08-25

## 2025-08-25 RX ORDER — LOSARTAN POTASSIUM 50 MG/1
TABLET ORAL
Qty: 180 TABLET | Refills: 1 | Status: SHIPPED | OUTPATIENT
Start: 2025-08-25

## 2025-08-25 RX ORDER — CLONIDINE HYDROCHLORIDE 0.1 MG/1
0.1 TABLET ORAL 3 TIMES DAILY
Qty: 270 TABLET | Refills: 1 | Status: SHIPPED | OUTPATIENT
Start: 2025-08-25

## (undated) DEVICE — HANDLE CVR PATENTED RETENTION DISC STRL LIGHT SHLD

## (undated) DEVICE — INTENDED FOR TISSUE SEPARATION, AND OTHER PROCEDURES THAT REQUIRE A SHARP SURGICAL BLADE TO PUNCTURE OR CUT.: Brand: BARD-PARKER ® STAINLESS STEEL BLADES

## (undated) DEVICE — GLOVE,SURG,SENSICARE,ALOE,LF,PF,7: Brand: MEDLINE

## (undated) DEVICE — GLOVE SURG SZ 75 L12IN FNGR THK94MIL STD WHT LTX FREE

## (undated) DEVICE — HAMMERTOE K-WIRE
Type: IMPLANTABLE DEVICE | Site: SECOND TOE | Status: NON-FUNCTIONAL
Brand: PHALINX
Removed: 2022-12-09

## (undated) DEVICE — PRECISION THIN (5.5 X 0.38 X 18.0MM)

## (undated) DEVICE — SYRINGE BLB 50CC IRRIG PLIABLE FNGR FLNG GRAD FLSK DISP

## (undated) DEVICE — GOWN,SIRUS,NON REINFRCD,LARGE,SET IN SL: Brand: MEDLINE

## (undated) DEVICE — GOWN SURG XL SMS FAB NONREINFORCED RAGLAN SLV HK LOOP CLSR

## (undated) DEVICE — TIBURON EXTREMITY SHEET: Brand: CONVERTORS

## (undated) DEVICE — MEDI-VAC NON-CONDUCTIVE SUCTION TUBING: Brand: CARDINAL HEALTH

## (undated) DEVICE — SUTURE ETHLN SZ 4-0 L18IN NONABSORBABLE BLK L19MM PS-2 3/8 1667H

## (undated) DEVICE — 1810 FOAM BLOCK NEEDLE COUNTER: Brand: DEVON

## (undated) DEVICE — DRESSING ALG W2XL5IN ANTIMIC WND JUMPSTART

## (undated) DEVICE — CHLORAPREP 26ML ORANGE

## (undated) DEVICE — SOLUTION IRRIG 1000ML 09% SOD CHL USP PIC PLAS CONTAINER

## (undated) DEVICE — NEEDLE HYPO 25GA L1.5IN BLU POLYPR HUB S STL REG BVL STR

## (undated) DEVICE — GAUZE,SPONGE,4"X4",16PLY,XRAY,STRL,LF: Brand: MEDLINE

## (undated) DEVICE — CLOTH PREP W7.5XL7.5IN 2% CHG SKIN ALC AND RNS FREE

## (undated) DEVICE — HAMMERTOE DRILL: Brand: PHALINX

## (undated) DEVICE — DOUBLE BASIN SET: Brand: MEDLINE INDUSTRIES, INC.

## (undated) DEVICE — BANDAGE,GAUZE,BULKEE II,4.5"X4.1YD,STRL: Brand: MEDLINE

## (undated) DEVICE — DRAPE 84X54IN RADIOLOGY C ARM KEYBOARD

## (undated) DEVICE — HOOK LOCK LATEX FREE ELASTIC BANDAGE 3INX5YD

## (undated) DEVICE — PEN: MARKING STD 100/CS: Brand: MEDICAL ACTION INDUSTRIES

## (undated) DEVICE — NEEDLE HYPO 18GA L1.5IN PNK POLYPR HUB S STL THN WALL FILL

## (undated) DEVICE — 12 ML SYRINGE,LUER-LOCK TIP: Brand: MONOJECT

## (undated) DEVICE — TOWEL OR BLUEE 16X26IN ST 8 PACK ORB08 16X26ORTWL

## (undated) DEVICE — SUTURE MCRYL SZ 3-0 L27IN ABSRB UD L26MM SH 1/2 CIR Y416H

## (undated) DEVICE — BASIC PACK: Brand: CONVERTORS

## (undated) DEVICE — GAUZE,SPONGE,4"X4",16PLY,STRL,LF,10/TRAY: Brand: MEDLINE

## (undated) DEVICE — SOLUTION IV IRRIG POUR BRL 0.9% SODIUM CHL 2F7124